# Patient Record
Sex: MALE | Race: WHITE | NOT HISPANIC OR LATINO | Employment: OTHER | URBAN - METROPOLITAN AREA
[De-identification: names, ages, dates, MRNs, and addresses within clinical notes are randomized per-mention and may not be internally consistent; named-entity substitution may affect disease eponyms.]

---

## 2017-01-10 ENCOUNTER — COMMUNICATION - HEALTHEAST (OUTPATIENT)
Dept: FAMILY MEDICINE | Facility: CLINIC | Age: 62
End: 2017-01-10

## 2017-01-10 DIAGNOSIS — M54.50 LOWER BACK PAIN: ICD-10-CM

## 2017-01-26 ENCOUNTER — COMMUNICATION - HEALTHEAST (OUTPATIENT)
Dept: FAMILY MEDICINE | Facility: CLINIC | Age: 62
End: 2017-01-26

## 2017-01-26 DIAGNOSIS — K21.9 GERD (GASTROESOPHAGEAL REFLUX DISEASE): ICD-10-CM

## 2017-02-09 ENCOUNTER — COMMUNICATION - HEALTHEAST (OUTPATIENT)
Dept: FAMILY MEDICINE | Facility: CLINIC | Age: 62
End: 2017-02-09

## 2017-02-09 DIAGNOSIS — M54.50 LOWER BACK PAIN: ICD-10-CM

## 2017-03-10 ENCOUNTER — COMMUNICATION - HEALTHEAST (OUTPATIENT)
Dept: FAMILY MEDICINE | Facility: CLINIC | Age: 62
End: 2017-03-10

## 2017-03-10 DIAGNOSIS — E78.5 HYPERLIPIDEMIA: ICD-10-CM

## 2017-03-10 DIAGNOSIS — M54.50 LOWER BACK PAIN: ICD-10-CM

## 2017-03-16 ENCOUNTER — COMMUNICATION - HEALTHEAST (OUTPATIENT)
Dept: FAMILY MEDICINE | Facility: CLINIC | Age: 62
End: 2017-03-16

## 2017-03-16 DIAGNOSIS — I10 HTN (HYPERTENSION): ICD-10-CM

## 2017-03-24 ENCOUNTER — COMMUNICATION - HEALTHEAST (OUTPATIENT)
Dept: FAMILY MEDICINE | Facility: CLINIC | Age: 62
End: 2017-03-24

## 2017-04-10 ENCOUNTER — OFFICE VISIT - HEALTHEAST (OUTPATIENT)
Dept: SLEEP MEDICINE | Facility: CLINIC | Age: 62
End: 2017-04-10

## 2017-04-10 DIAGNOSIS — R06.89 HYPOVENTILATION: ICD-10-CM

## 2017-04-10 DIAGNOSIS — J98.6 DISORDERS OF DIAPHRAGM: ICD-10-CM

## 2017-04-10 ASSESSMENT — MIFFLIN-ST. JEOR: SCORE: 1639.94

## 2017-04-11 ENCOUNTER — AMBULATORY - HEALTHEAST (OUTPATIENT)
Dept: SLEEP MEDICINE | Facility: CLINIC | Age: 62
End: 2017-04-11

## 2017-04-11 ENCOUNTER — COMMUNICATION - HEALTHEAST (OUTPATIENT)
Dept: FAMILY MEDICINE | Facility: CLINIC | Age: 62
End: 2017-04-11

## 2017-04-11 DIAGNOSIS — M54.50 LOWER BACK PAIN: ICD-10-CM

## 2017-04-18 ENCOUNTER — AMBULATORY - HEALTHEAST (OUTPATIENT)
Dept: PULMONOLOGY | Facility: OTHER | Age: 62
End: 2017-04-18

## 2017-04-18 DIAGNOSIS — J98.6 DIAPHRAGM PARALYSIS: ICD-10-CM

## 2017-05-03 ENCOUNTER — COMMUNICATION - HEALTHEAST (OUTPATIENT)
Dept: PULMONOLOGY | Facility: OTHER | Age: 62
End: 2017-05-03

## 2017-05-10 ENCOUNTER — COMMUNICATION - HEALTHEAST (OUTPATIENT)
Dept: FAMILY MEDICINE | Facility: CLINIC | Age: 62
End: 2017-05-10

## 2017-05-10 DIAGNOSIS — M54.50 LOWER BACK PAIN: ICD-10-CM

## 2017-05-17 ENCOUNTER — OFFICE VISIT - HEALTHEAST (OUTPATIENT)
Dept: PULMONOLOGY | Facility: OTHER | Age: 62
End: 2017-05-17

## 2017-05-17 ENCOUNTER — RECORDS - HEALTHEAST (OUTPATIENT)
Dept: PULMONOLOGY | Facility: OTHER | Age: 62
End: 2017-05-17

## 2017-05-17 ENCOUNTER — RECORDS - HEALTHEAST (OUTPATIENT)
Dept: ADMINISTRATIVE | Facility: OTHER | Age: 62
End: 2017-05-17

## 2017-05-17 DIAGNOSIS — J99 NEUROMUSCULAR RESPIRATORY WEAKNESS (H): ICD-10-CM

## 2017-05-17 DIAGNOSIS — G70.9 NEUROMUSCULAR RESPIRATORY WEAKNESS (H): ICD-10-CM

## 2017-05-17 DIAGNOSIS — J98.6 DISORDERS OF DIAPHRAGM: ICD-10-CM

## 2017-05-25 ENCOUNTER — OFFICE VISIT - HEALTHEAST (OUTPATIENT)
Dept: PULMONOLOGY | Facility: OTHER | Age: 62
End: 2017-05-25

## 2017-05-25 DIAGNOSIS — J98.6 DIAPHRAGM PARALYSIS: ICD-10-CM

## 2017-05-25 ASSESSMENT — MIFFLIN-ST. JEOR: SCORE: 1621.8

## 2017-06-09 ENCOUNTER — COMMUNICATION - HEALTHEAST (OUTPATIENT)
Dept: FAMILY MEDICINE | Facility: CLINIC | Age: 62
End: 2017-06-09

## 2017-06-09 DIAGNOSIS — M54.50 LOWER BACK PAIN: ICD-10-CM

## 2017-07-10 ENCOUNTER — COMMUNICATION - HEALTHEAST (OUTPATIENT)
Dept: FAMILY MEDICINE | Facility: CLINIC | Age: 62
End: 2017-07-10

## 2017-07-10 ENCOUNTER — OFFICE VISIT - HEALTHEAST (OUTPATIENT)
Dept: SLEEP MEDICINE | Facility: CLINIC | Age: 62
End: 2017-07-10

## 2017-07-10 DIAGNOSIS — R06.89 HYPOVENTILATION: ICD-10-CM

## 2017-07-10 DIAGNOSIS — M54.50 LOWER BACK PAIN: ICD-10-CM

## 2017-07-10 ASSESSMENT — MIFFLIN-ST. JEOR: SCORE: 1603.66

## 2017-08-08 ENCOUNTER — COMMUNICATION - HEALTHEAST (OUTPATIENT)
Dept: FAMILY MEDICINE | Facility: CLINIC | Age: 62
End: 2017-08-08

## 2017-08-08 DIAGNOSIS — M54.50 LOWER BACK PAIN: ICD-10-CM

## 2017-09-08 ENCOUNTER — COMMUNICATION - HEALTHEAST (OUTPATIENT)
Dept: FAMILY MEDICINE | Facility: CLINIC | Age: 62
End: 2017-09-08

## 2017-09-08 DIAGNOSIS — M54.50 LOWER BACK PAIN: ICD-10-CM

## 2017-10-06 ENCOUNTER — COMMUNICATION - HEALTHEAST (OUTPATIENT)
Dept: FAMILY MEDICINE | Facility: CLINIC | Age: 62
End: 2017-10-06

## 2017-10-06 DIAGNOSIS — M54.50 LOWER BACK PAIN: ICD-10-CM

## 2017-11-06 ENCOUNTER — COMMUNICATION - HEALTHEAST (OUTPATIENT)
Dept: FAMILY MEDICINE | Facility: CLINIC | Age: 62
End: 2017-11-06

## 2017-11-06 DIAGNOSIS — M54.50 LOWER BACK PAIN: ICD-10-CM

## 2017-12-06 ENCOUNTER — COMMUNICATION - HEALTHEAST (OUTPATIENT)
Dept: FAMILY MEDICINE | Facility: CLINIC | Age: 62
End: 2017-12-06

## 2017-12-06 DIAGNOSIS — M54.50 LOWER BACK PAIN: ICD-10-CM

## 2017-12-17 ENCOUNTER — COMMUNICATION - HEALTHEAST (OUTPATIENT)
Dept: FAMILY MEDICINE | Facility: CLINIC | Age: 62
End: 2017-12-17

## 2017-12-17 DIAGNOSIS — E78.5 HYPERLIPIDEMIA: ICD-10-CM

## 2017-12-20 ENCOUNTER — COMMUNICATION - HEALTHEAST (OUTPATIENT)
Dept: FAMILY MEDICINE | Facility: CLINIC | Age: 62
End: 2017-12-20

## 2018-01-15 ENCOUNTER — COMMUNICATION - HEALTHEAST (OUTPATIENT)
Dept: FAMILY MEDICINE | Facility: CLINIC | Age: 63
End: 2018-01-15

## 2018-01-15 DIAGNOSIS — M54.50 LOWER BACK PAIN: ICD-10-CM

## 2018-01-29 ENCOUNTER — COMMUNICATION - HEALTHEAST (OUTPATIENT)
Dept: FAMILY MEDICINE | Facility: CLINIC | Age: 63
End: 2018-01-29

## 2018-01-29 DIAGNOSIS — K21.9 GERD (GASTROESOPHAGEAL REFLUX DISEASE): ICD-10-CM

## 2018-02-15 ENCOUNTER — COMMUNICATION - HEALTHEAST (OUTPATIENT)
Dept: FAMILY MEDICINE | Facility: CLINIC | Age: 63
End: 2018-02-15

## 2018-02-15 DIAGNOSIS — M54.50 LOWER BACK PAIN: ICD-10-CM

## 2018-03-13 ENCOUNTER — COMMUNICATION - HEALTHEAST (OUTPATIENT)
Dept: FAMILY MEDICINE | Facility: CLINIC | Age: 63
End: 2018-03-13

## 2018-03-13 DIAGNOSIS — E78.5 HYPERLIPIDEMIA: ICD-10-CM

## 2018-03-19 ENCOUNTER — COMMUNICATION - HEALTHEAST (OUTPATIENT)
Dept: FAMILY MEDICINE | Facility: CLINIC | Age: 63
End: 2018-03-19

## 2018-03-19 DIAGNOSIS — E78.5 HYPERLIPIDEMIA: ICD-10-CM

## 2018-03-21 ENCOUNTER — COMMUNICATION - HEALTHEAST (OUTPATIENT)
Dept: FAMILY MEDICINE | Facility: CLINIC | Age: 63
End: 2018-03-21

## 2018-03-21 DIAGNOSIS — M54.50 LOWER BACK PAIN: ICD-10-CM

## 2018-03-26 ENCOUNTER — OFFICE VISIT - HEALTHEAST (OUTPATIENT)
Dept: FAMILY MEDICINE | Facility: CLINIC | Age: 63
End: 2018-03-26

## 2018-03-26 DIAGNOSIS — I10 ESSENTIAL HYPERTENSION: ICD-10-CM

## 2018-03-26 DIAGNOSIS — E78.5 HYPERLIPIDEMIA: ICD-10-CM

## 2018-03-26 LAB
ALBUMIN SERPL-MCNC: 3.4 G/DL (ref 3.5–5)
ALP SERPL-CCNC: 230 U/L (ref 45–120)
ALT SERPL W P-5'-P-CCNC: 30 U/L (ref 0–45)
ANION GAP SERPL CALCULATED.3IONS-SCNC: 13 MMOL/L (ref 5–18)
AST SERPL W P-5'-P-CCNC: 47 U/L (ref 0–40)
BILIRUB SERPL-MCNC: 0.5 MG/DL (ref 0–1)
BUN SERPL-MCNC: 5 MG/DL (ref 8–22)
CALCIUM SERPL-MCNC: 9.2 MG/DL (ref 8.5–10.5)
CHLORIDE BLD-SCNC: 102 MMOL/L (ref 98–107)
CHOLEST SERPL-MCNC: 176 MG/DL
CO2 SERPL-SCNC: 25 MMOL/L (ref 22–31)
CREAT SERPL-MCNC: 0.68 MG/DL (ref 0.7–1.3)
FASTING STATUS PATIENT QL REPORTED: YES
GFR SERPL CREATININE-BSD FRML MDRD: >60 ML/MIN/1.73M2
GLUCOSE BLD-MCNC: 121 MG/DL (ref 70–125)
HDLC SERPL-MCNC: 67 MG/DL
LDLC SERPL CALC-MCNC: 95 MG/DL
POTASSIUM BLD-SCNC: 3.8 MMOL/L (ref 3.5–5)
PROT SERPL-MCNC: 7 G/DL (ref 6–8)
SODIUM SERPL-SCNC: 140 MMOL/L (ref 136–145)
TRIGL SERPL-MCNC: 70 MG/DL

## 2018-03-30 ENCOUNTER — COMMUNICATION - HEALTHEAST (OUTPATIENT)
Dept: NURSING | Facility: CLINIC | Age: 63
End: 2018-03-30

## 2018-03-30 ENCOUNTER — COMMUNICATION - HEALTHEAST (OUTPATIENT)
Dept: FAMILY MEDICINE | Facility: CLINIC | Age: 63
End: 2018-03-30

## 2018-04-19 ENCOUNTER — COMMUNICATION - HEALTHEAST (OUTPATIENT)
Dept: FAMILY MEDICINE | Facility: CLINIC | Age: 63
End: 2018-04-19

## 2018-04-19 DIAGNOSIS — M54.50 LOWER BACK PAIN: ICD-10-CM

## 2018-04-29 ENCOUNTER — COMMUNICATION - HEALTHEAST (OUTPATIENT)
Dept: FAMILY MEDICINE | Facility: CLINIC | Age: 63
End: 2018-04-29

## 2018-04-29 DIAGNOSIS — K21.9 GERD (GASTROESOPHAGEAL REFLUX DISEASE): ICD-10-CM

## 2018-04-30 ENCOUNTER — COMMUNICATION - HEALTHEAST (OUTPATIENT)
Dept: FAMILY MEDICINE | Facility: CLINIC | Age: 63
End: 2018-04-30

## 2018-04-30 DIAGNOSIS — E78.5 HYPERLIPIDEMIA: ICD-10-CM

## 2018-05-21 ENCOUNTER — COMMUNICATION - HEALTHEAST (OUTPATIENT)
Dept: FAMILY MEDICINE | Facility: CLINIC | Age: 63
End: 2018-05-21

## 2018-05-21 DIAGNOSIS — M54.50 LOWER BACK PAIN: ICD-10-CM

## 2018-06-19 ENCOUNTER — RECORDS - HEALTHEAST (OUTPATIENT)
Dept: GENERAL RADIOLOGY | Facility: CLINIC | Age: 63
End: 2018-06-19

## 2018-06-19 ENCOUNTER — OFFICE VISIT - HEALTHEAST (OUTPATIENT)
Dept: FAMILY MEDICINE | Facility: CLINIC | Age: 63
End: 2018-06-19

## 2018-06-19 DIAGNOSIS — M54.2 NECK PAIN: ICD-10-CM

## 2018-06-19 DIAGNOSIS — Z95.5 HX OF HEART ARTERY STENT: ICD-10-CM

## 2018-06-19 DIAGNOSIS — I25.2 H/O NON-ST ELEVATION MYOCARDIAL INFARCTION (NSTEMI): ICD-10-CM

## 2018-06-19 DIAGNOSIS — M54.2 CERVICALGIA: ICD-10-CM

## 2018-06-19 DIAGNOSIS — Z12.11 SCREEN FOR COLON CANCER: ICD-10-CM

## 2018-06-19 DIAGNOSIS — M79.604 BILATERAL LEG PAIN: ICD-10-CM

## 2018-06-19 DIAGNOSIS — M79.605 BILATERAL LEG PAIN: ICD-10-CM

## 2018-06-19 RX ORDER — NITROGLYCERIN 0.4 MG/1
0.4 TABLET SUBLINGUAL EVERY 5 MIN PRN
Status: SHIPPED | COMMUNITY
Start: 2018-06-09

## 2018-06-20 ENCOUNTER — AMBULATORY - HEALTHEAST (OUTPATIENT)
Dept: FAMILY MEDICINE | Facility: CLINIC | Age: 63
End: 2018-06-20

## 2018-06-20 DIAGNOSIS — I65.23 CAROTID ARTERY CALCIFICATION, BILATERAL: ICD-10-CM

## 2018-06-21 ENCOUNTER — COMMUNICATION - HEALTHEAST (OUTPATIENT)
Dept: FAMILY MEDICINE | Facility: CLINIC | Age: 63
End: 2018-06-21

## 2018-06-21 DIAGNOSIS — M54.50 LOWER BACK PAIN: ICD-10-CM

## 2018-06-25 ENCOUNTER — COMMUNICATION - HEALTHEAST (OUTPATIENT)
Dept: FAMILY MEDICINE | Facility: CLINIC | Age: 63
End: 2018-06-25

## 2018-06-25 DIAGNOSIS — M54.50 LOWER BACK PAIN: ICD-10-CM

## 2018-06-25 DIAGNOSIS — M54.12 CERVICAL RADICULOPATHY: ICD-10-CM

## 2018-06-27 ENCOUNTER — RECORDS - HEALTHEAST (OUTPATIENT)
Dept: VASCULAR ULTRASOUND | Facility: CLINIC | Age: 63
End: 2018-06-27

## 2018-06-27 ENCOUNTER — OFFICE VISIT - HEALTHEAST (OUTPATIENT)
Dept: VASCULAR SURGERY | Facility: CLINIC | Age: 63
End: 2018-06-27

## 2018-06-27 DIAGNOSIS — Z95.5 HX OF HEART ARTERY STENT: ICD-10-CM

## 2018-06-27 DIAGNOSIS — Z95.5 PRESENCE OF CORONARY ANGIOPLASTY IMPLANT AND GRAFT: ICD-10-CM

## 2018-06-27 DIAGNOSIS — I71.40 ABDOMINAL AORTIC ANEURYSM, WITHOUT RUPTURE: ICD-10-CM

## 2018-06-27 DIAGNOSIS — I25.10 CORONARY ATHEROSCLEROSIS: ICD-10-CM

## 2018-06-27 DIAGNOSIS — Z13.6 ENCOUNTER FOR SCREENING FOR CARDIOVASCULAR DISORDERS: ICD-10-CM

## 2018-06-27 DIAGNOSIS — I71.40 AAA (ABDOMINAL AORTIC ANEURYSM) (H): ICD-10-CM

## 2018-06-27 DIAGNOSIS — I25.2 H/O NON-ST ELEVATION MYOCARDIAL INFARCTION (NSTEMI): ICD-10-CM

## 2018-06-27 DIAGNOSIS — Z13.6 SCREENING FOR AAA (ABDOMINAL AORTIC ANEURYSM): ICD-10-CM

## 2018-06-27 DIAGNOSIS — I25.10 ATHEROSCLEROTIC HEART DISEASE OF NATIVE CORONARY ARTERY WITHOUT ANGINA PECTORIS: ICD-10-CM

## 2018-06-27 DIAGNOSIS — I25.2 OLD MYOCARDIAL INFARCTION: ICD-10-CM

## 2018-06-27 DIAGNOSIS — I65.23 OCCLUSION AND STENOSIS OF BILATERAL CAROTID ARTERIES: ICD-10-CM

## 2018-06-29 ENCOUNTER — AMBULATORY - HEALTHEAST (OUTPATIENT)
Dept: FAMILY MEDICINE | Facility: CLINIC | Age: 63
End: 2018-06-29

## 2018-06-29 DIAGNOSIS — M51.379 DEGENERATION OF LUMBAR OR LUMBOSACRAL INTERVERTEBRAL DISC: ICD-10-CM

## 2018-06-29 DIAGNOSIS — I25.10 ATHEROSCLEROSIS OF NATIVE CORONARY ARTERY OF NATIVE HEART WITHOUT ANGINA PECTORIS: ICD-10-CM

## 2018-07-09 ENCOUNTER — RECORDS - HEALTHEAST (OUTPATIENT)
Dept: ADMINISTRATIVE | Facility: OTHER | Age: 63
End: 2018-07-09

## 2018-07-09 ENCOUNTER — AMBULATORY - HEALTHEAST (OUTPATIENT)
Dept: CARDIAC REHAB | Facility: CLINIC | Age: 63
End: 2018-07-09

## 2018-07-09 DIAGNOSIS — Z95.5 S/P DRUG ELUTING CORONARY STENT PLACEMENT: ICD-10-CM

## 2018-07-09 DIAGNOSIS — I21.4 NON-ST ELEVATION MI (NSTEMI) (H): ICD-10-CM

## 2018-07-10 ENCOUNTER — HOSPITAL ENCOUNTER (OUTPATIENT)
Dept: MRI IMAGING | Facility: CLINIC | Age: 63
Discharge: HOME | End: 2018-07-10
Attending: FAMILY MEDICINE

## 2018-07-10 DIAGNOSIS — M51.379 DEGENERATION OF LUMBAR OR LUMBOSACRAL INTERVERTEBRAL DISC: ICD-10-CM

## 2018-07-11 ENCOUNTER — OFFICE VISIT - HEALTHEAST (OUTPATIENT)
Dept: PHYSICAL THERAPY | Facility: REHABILITATION | Age: 63
End: 2018-07-11

## 2018-07-11 DIAGNOSIS — R29.3 POOR POSTURE: ICD-10-CM

## 2018-07-11 DIAGNOSIS — M43.6 NECK STIFFNESS: ICD-10-CM

## 2018-07-11 DIAGNOSIS — R68.89 NECK PROBLEM: ICD-10-CM

## 2018-07-12 ENCOUNTER — AMBULATORY - HEALTHEAST (OUTPATIENT)
Dept: CARDIAC REHAB | Facility: CLINIC | Age: 63
End: 2018-07-12

## 2018-07-12 ENCOUNTER — COMMUNICATION - HEALTHEAST (OUTPATIENT)
Dept: FAMILY MEDICINE | Facility: CLINIC | Age: 63
End: 2018-07-12

## 2018-07-12 DIAGNOSIS — I21.4 NON-ST ELEVATION MI (NSTEMI) (H): ICD-10-CM

## 2018-07-12 DIAGNOSIS — Z95.5 S/P DRUG ELUTING CORONARY STENT PLACEMENT: ICD-10-CM

## 2018-07-15 ENCOUNTER — COMMUNICATION - HEALTHEAST (OUTPATIENT)
Dept: FAMILY MEDICINE | Facility: CLINIC | Age: 63
End: 2018-07-15

## 2018-07-16 ENCOUNTER — COMMUNICATION - HEALTHEAST (OUTPATIENT)
Dept: FAMILY MEDICINE | Facility: CLINIC | Age: 63
End: 2018-07-16

## 2018-07-16 ENCOUNTER — OFFICE VISIT - HEALTHEAST (OUTPATIENT)
Dept: PHYSICAL THERAPY | Facility: REHABILITATION | Age: 63
End: 2018-07-16

## 2018-07-16 DIAGNOSIS — R29.3 POOR POSTURE: ICD-10-CM

## 2018-07-16 DIAGNOSIS — M43.6 NECK STIFFNESS: ICD-10-CM

## 2018-07-16 DIAGNOSIS — M54.50 LOWER BACK PAIN: ICD-10-CM

## 2018-07-16 DIAGNOSIS — R19.5 POSITIVE COLORECTAL CANCER SCREENING USING DNA-BASED STOOL TEST: ICD-10-CM

## 2018-07-16 DIAGNOSIS — R68.89 NECK PROBLEM: ICD-10-CM

## 2018-07-17 ENCOUNTER — AMBULATORY - HEALTHEAST (OUTPATIENT)
Dept: CARDIAC REHAB | Facility: CLINIC | Age: 63
End: 2018-07-17

## 2018-07-17 DIAGNOSIS — I21.4 NON-ST ELEVATION MI (NSTEMI) (H): ICD-10-CM

## 2018-07-17 DIAGNOSIS — Z95.5 S/P DRUG ELUTING CORONARY STENT PLACEMENT: ICD-10-CM

## 2018-07-19 ENCOUNTER — AMBULATORY - HEALTHEAST (OUTPATIENT)
Dept: CARDIAC REHAB | Facility: CLINIC | Age: 63
End: 2018-07-19

## 2018-07-19 DIAGNOSIS — I21.4 NON-ST ELEVATION MI (NSTEMI) (H): ICD-10-CM

## 2018-07-19 DIAGNOSIS — Z95.5 S/P DRUG ELUTING CORONARY STENT PLACEMENT: ICD-10-CM

## 2018-07-23 ENCOUNTER — OFFICE VISIT - HEALTHEAST (OUTPATIENT)
Dept: PHYSICAL THERAPY | Facility: REHABILITATION | Age: 63
End: 2018-07-23

## 2018-07-23 DIAGNOSIS — R68.89 NECK PROBLEM: ICD-10-CM

## 2018-07-23 DIAGNOSIS — M43.6 NECK STIFFNESS: ICD-10-CM

## 2018-07-23 DIAGNOSIS — R29.3 POOR POSTURE: ICD-10-CM

## 2018-07-24 ENCOUNTER — AMBULATORY - HEALTHEAST (OUTPATIENT)
Dept: CARDIAC REHAB | Facility: CLINIC | Age: 63
End: 2018-07-24

## 2018-07-24 DIAGNOSIS — I21.4 NON-ST ELEVATION MI (NSTEMI) (H): ICD-10-CM

## 2018-07-24 DIAGNOSIS — Z95.5 S/P DRUG ELUTING CORONARY STENT PLACEMENT: ICD-10-CM

## 2018-07-26 ENCOUNTER — AMBULATORY - HEALTHEAST (OUTPATIENT)
Dept: CARDIAC REHAB | Facility: CLINIC | Age: 63
End: 2018-07-26

## 2018-07-26 DIAGNOSIS — I21.4 NON-ST ELEVATION MI (NSTEMI) (H): ICD-10-CM

## 2018-07-26 DIAGNOSIS — Z95.5 S/P DRUG ELUTING CORONARY STENT PLACEMENT: ICD-10-CM

## 2018-07-31 ENCOUNTER — AMBULATORY - HEALTHEAST (OUTPATIENT)
Dept: CARDIAC REHAB | Facility: CLINIC | Age: 63
End: 2018-07-31

## 2018-07-31 DIAGNOSIS — I21.4 NON-ST ELEVATION MI (NSTEMI) (H): ICD-10-CM

## 2018-07-31 DIAGNOSIS — Z95.5 S/P DRUG ELUTING CORONARY STENT PLACEMENT: ICD-10-CM

## 2018-08-02 ENCOUNTER — AMBULATORY - HEALTHEAST (OUTPATIENT)
Dept: CARDIAC REHAB | Facility: CLINIC | Age: 63
End: 2018-08-02

## 2018-08-02 DIAGNOSIS — I21.4 NON-ST ELEVATION MI (NSTEMI) (H): ICD-10-CM

## 2018-08-02 DIAGNOSIS — Z95.5 S/P DRUG ELUTING CORONARY STENT PLACEMENT: ICD-10-CM

## 2018-08-07 ENCOUNTER — AMBULATORY - HEALTHEAST (OUTPATIENT)
Dept: CARDIAC REHAB | Facility: CLINIC | Age: 63
End: 2018-08-07

## 2018-08-07 DIAGNOSIS — Z95.5 S/P DRUG ELUTING CORONARY STENT PLACEMENT: ICD-10-CM

## 2018-08-07 DIAGNOSIS — I21.4 NON-ST ELEVATION MI (NSTEMI) (H): ICD-10-CM

## 2018-08-09 ENCOUNTER — AMBULATORY - HEALTHEAST (OUTPATIENT)
Dept: CARDIAC REHAB | Facility: CLINIC | Age: 63
End: 2018-08-09

## 2018-08-09 DIAGNOSIS — Z95.5 S/P DRUG ELUTING CORONARY STENT PLACEMENT: ICD-10-CM

## 2018-08-09 DIAGNOSIS — I21.4 NON-ST ELEVATION MI (NSTEMI) (H): ICD-10-CM

## 2018-08-14 ENCOUNTER — AMBULATORY - HEALTHEAST (OUTPATIENT)
Dept: CARDIAC REHAB | Facility: CLINIC | Age: 63
End: 2018-08-14

## 2018-08-14 DIAGNOSIS — Z95.5 S/P DRUG ELUTING CORONARY STENT PLACEMENT: ICD-10-CM

## 2018-08-14 DIAGNOSIS — I21.4 NON-ST ELEVATION MI (NSTEMI) (H): ICD-10-CM

## 2018-08-15 ENCOUNTER — COMMUNICATION - HEALTHEAST (OUTPATIENT)
Dept: FAMILY MEDICINE | Facility: CLINIC | Age: 63
End: 2018-08-15

## 2018-08-15 DIAGNOSIS — M54.50 LOWER BACK PAIN: ICD-10-CM

## 2018-08-16 ENCOUNTER — AMBULATORY - HEALTHEAST (OUTPATIENT)
Dept: CARDIAC REHAB | Facility: CLINIC | Age: 63
End: 2018-08-16

## 2018-08-16 DIAGNOSIS — Z95.5 S/P DRUG ELUTING CORONARY STENT PLACEMENT: ICD-10-CM

## 2018-08-16 DIAGNOSIS — I21.4 NON-ST ELEVATION MI (NSTEMI) (H): ICD-10-CM

## 2018-08-21 ENCOUNTER — AMBULATORY - HEALTHEAST (OUTPATIENT)
Dept: CARDIAC REHAB | Facility: CLINIC | Age: 63
End: 2018-08-21

## 2018-08-21 DIAGNOSIS — I21.4 NON-ST ELEVATION MI (NSTEMI) (H): ICD-10-CM

## 2018-08-21 DIAGNOSIS — Z95.5 S/P DRUG ELUTING CORONARY STENT PLACEMENT: ICD-10-CM

## 2018-08-23 ENCOUNTER — AMBULATORY - HEALTHEAST (OUTPATIENT)
Dept: CARDIAC REHAB | Facility: CLINIC | Age: 63
End: 2018-08-23

## 2018-08-23 DIAGNOSIS — Z95.5 S/P DRUG ELUTING CORONARY STENT PLACEMENT: ICD-10-CM

## 2018-08-23 DIAGNOSIS — I21.4 NON-ST ELEVATION MI (NSTEMI) (H): ICD-10-CM

## 2018-08-28 ENCOUNTER — AMBULATORY - HEALTHEAST (OUTPATIENT)
Dept: CARDIAC REHAB | Facility: CLINIC | Age: 63
End: 2018-08-28

## 2018-08-28 DIAGNOSIS — I21.4 NON-ST ELEVATION MI (NSTEMI) (H): ICD-10-CM

## 2018-08-28 DIAGNOSIS — Z95.5 S/P DRUG ELUTING CORONARY STENT PLACEMENT: ICD-10-CM

## 2018-08-30 ENCOUNTER — AMBULATORY - HEALTHEAST (OUTPATIENT)
Dept: CARDIAC REHAB | Facility: CLINIC | Age: 63
End: 2018-08-30

## 2018-08-30 DIAGNOSIS — Z95.5 S/P DRUG ELUTING CORONARY STENT PLACEMENT: ICD-10-CM

## 2018-08-30 DIAGNOSIS — I21.4 NON-ST ELEVATION MI (NSTEMI) (H): ICD-10-CM

## 2018-09-04 ENCOUNTER — AMBULATORY - HEALTHEAST (OUTPATIENT)
Dept: CARDIAC REHAB | Facility: CLINIC | Age: 63
End: 2018-09-04

## 2018-09-04 DIAGNOSIS — Z95.5 S/P DRUG ELUTING CORONARY STENT PLACEMENT: ICD-10-CM

## 2018-09-04 DIAGNOSIS — I21.4 NON-ST ELEVATION MI (NSTEMI) (H): ICD-10-CM

## 2018-09-06 ENCOUNTER — AMBULATORY - HEALTHEAST (OUTPATIENT)
Dept: CARDIAC REHAB | Facility: CLINIC | Age: 63
End: 2018-09-06

## 2018-09-06 DIAGNOSIS — I21.4 NON-ST ELEVATION MI (NSTEMI) (H): ICD-10-CM

## 2018-09-06 DIAGNOSIS — Z95.5 S/P DRUG ELUTING CORONARY STENT PLACEMENT: ICD-10-CM

## 2018-09-07 ENCOUNTER — COMMUNICATION - HEALTHEAST (OUTPATIENT)
Dept: FAMILY MEDICINE | Facility: CLINIC | Age: 63
End: 2018-09-07

## 2018-09-11 ENCOUNTER — AMBULATORY - HEALTHEAST (OUTPATIENT)
Dept: CARDIAC REHAB | Facility: CLINIC | Age: 63
End: 2018-09-11

## 2018-09-11 DIAGNOSIS — I21.4 NON-ST ELEVATION MI (NSTEMI) (H): ICD-10-CM

## 2018-09-11 DIAGNOSIS — Z95.5 S/P DRUG ELUTING CORONARY STENT PLACEMENT: ICD-10-CM

## 2018-09-13 ENCOUNTER — AMBULATORY - HEALTHEAST (OUTPATIENT)
Dept: CARDIAC REHAB | Facility: CLINIC | Age: 63
End: 2018-09-13

## 2018-09-13 ENCOUNTER — COMMUNICATION - HEALTHEAST (OUTPATIENT)
Dept: FAMILY MEDICINE | Facility: CLINIC | Age: 63
End: 2018-09-13

## 2018-09-13 DIAGNOSIS — I21.4 NON-ST ELEVATION MI (NSTEMI) (H): ICD-10-CM

## 2018-09-13 DIAGNOSIS — M54.50 LOWER BACK PAIN: ICD-10-CM

## 2018-09-13 DIAGNOSIS — Z95.5 S/P DRUG ELUTING CORONARY STENT PLACEMENT: ICD-10-CM

## 2018-09-18 ENCOUNTER — AMBULATORY - HEALTHEAST (OUTPATIENT)
Dept: CARDIAC REHAB | Facility: CLINIC | Age: 63
End: 2018-09-18

## 2018-09-18 DIAGNOSIS — I21.4 NON-ST ELEVATION MI (NSTEMI) (H): ICD-10-CM

## 2018-09-18 DIAGNOSIS — Z95.5 S/P DRUG ELUTING CORONARY STENT PLACEMENT: ICD-10-CM

## 2018-09-20 ENCOUNTER — AMBULATORY - HEALTHEAST (OUTPATIENT)
Dept: CARDIAC REHAB | Facility: CLINIC | Age: 63
End: 2018-09-20

## 2018-09-20 DIAGNOSIS — I21.4 NON-ST ELEVATION MI (NSTEMI) (H): ICD-10-CM

## 2018-09-20 DIAGNOSIS — Z95.5 S/P DRUG ELUTING CORONARY STENT PLACEMENT: ICD-10-CM

## 2018-09-25 ENCOUNTER — AMBULATORY - HEALTHEAST (OUTPATIENT)
Dept: CARDIAC REHAB | Facility: CLINIC | Age: 63
End: 2018-09-25

## 2018-09-25 DIAGNOSIS — Z95.5 S/P DRUG ELUTING CORONARY STENT PLACEMENT: ICD-10-CM

## 2018-09-25 DIAGNOSIS — I21.4 NON-ST ELEVATION MI (NSTEMI) (H): ICD-10-CM

## 2018-09-26 ENCOUNTER — COMMUNICATION - HEALTHEAST (OUTPATIENT)
Dept: FAMILY MEDICINE | Facility: CLINIC | Age: 63
End: 2018-09-26

## 2018-09-27 ENCOUNTER — AMBULATORY - HEALTHEAST (OUTPATIENT)
Dept: CARDIAC REHAB | Facility: CLINIC | Age: 63
End: 2018-09-27

## 2018-09-27 DIAGNOSIS — Z95.5 S/P DRUG ELUTING CORONARY STENT PLACEMENT: ICD-10-CM

## 2018-09-27 DIAGNOSIS — I21.4 NON-ST ELEVATION MI (NSTEMI) (H): ICD-10-CM

## 2018-10-02 ENCOUNTER — AMBULATORY - HEALTHEAST (OUTPATIENT)
Dept: CARDIAC REHAB | Facility: CLINIC | Age: 63
End: 2018-10-02

## 2018-10-02 DIAGNOSIS — I21.4 NON-ST ELEVATION MI (NSTEMI) (H): ICD-10-CM

## 2018-10-02 DIAGNOSIS — Z95.5 S/P DRUG ELUTING CORONARY STENT PLACEMENT: ICD-10-CM

## 2018-10-04 ENCOUNTER — AMBULATORY - HEALTHEAST (OUTPATIENT)
Dept: CARDIAC REHAB | Facility: CLINIC | Age: 63
End: 2018-10-04

## 2018-10-04 DIAGNOSIS — Z95.5 S/P DRUG ELUTING CORONARY STENT PLACEMENT: ICD-10-CM

## 2018-10-04 DIAGNOSIS — I21.4 NON-ST ELEVATION MI (NSTEMI) (H): ICD-10-CM

## 2018-10-08 ENCOUNTER — COMMUNICATION - HEALTHEAST (OUTPATIENT)
Dept: ADMINISTRATIVE | Facility: CLINIC | Age: 63
End: 2018-10-08

## 2018-10-09 ENCOUNTER — AMBULATORY - HEALTHEAST (OUTPATIENT)
Dept: CARDIAC REHAB | Facility: CLINIC | Age: 63
End: 2018-10-09

## 2018-10-09 DIAGNOSIS — Z95.5 S/P DRUG ELUTING CORONARY STENT PLACEMENT: ICD-10-CM

## 2018-10-09 DIAGNOSIS — I21.4 NON-ST ELEVATION MI (NSTEMI) (H): ICD-10-CM

## 2018-10-11 ENCOUNTER — AMBULATORY - HEALTHEAST (OUTPATIENT)
Dept: CARDIAC REHAB | Facility: CLINIC | Age: 63
End: 2018-10-11

## 2018-10-11 DIAGNOSIS — I21.4 NON-ST ELEVATION MI (NSTEMI) (H): ICD-10-CM

## 2018-10-11 DIAGNOSIS — Z95.5 S/P DRUG ELUTING CORONARY STENT PLACEMENT: ICD-10-CM

## 2018-10-15 ENCOUNTER — COMMUNICATION - HEALTHEAST (OUTPATIENT)
Dept: FAMILY MEDICINE | Facility: CLINIC | Age: 63
End: 2018-10-15

## 2018-10-15 DIAGNOSIS — M54.50 LOWER BACK PAIN: ICD-10-CM

## 2018-10-16 ENCOUNTER — AMBULATORY - HEALTHEAST (OUTPATIENT)
Dept: CARDIAC REHAB | Facility: CLINIC | Age: 63
End: 2018-10-16

## 2018-10-16 DIAGNOSIS — Z95.5 S/P DRUG ELUTING CORONARY STENT PLACEMENT: ICD-10-CM

## 2018-10-16 DIAGNOSIS — I21.4 NON-ST ELEVATION MI (NSTEMI) (H): ICD-10-CM

## 2018-10-18 ENCOUNTER — AMBULATORY - HEALTHEAST (OUTPATIENT)
Dept: CARDIAC REHAB | Facility: CLINIC | Age: 63
End: 2018-10-18

## 2018-10-18 DIAGNOSIS — Z95.5 S/P DRUG ELUTING CORONARY STENT PLACEMENT: ICD-10-CM

## 2018-10-18 DIAGNOSIS — I21.4 NON-ST ELEVATION MI (NSTEMI) (H): ICD-10-CM

## 2018-10-23 ENCOUNTER — AMBULATORY - HEALTHEAST (OUTPATIENT)
Dept: CARDIAC REHAB | Facility: CLINIC | Age: 63
End: 2018-10-23

## 2018-10-23 DIAGNOSIS — I21.4 NON-ST ELEVATION MI (NSTEMI) (H): ICD-10-CM

## 2018-10-23 DIAGNOSIS — Z95.5 S/P DRUG ELUTING CORONARY STENT PLACEMENT: ICD-10-CM

## 2018-10-25 ENCOUNTER — AMBULATORY - HEALTHEAST (OUTPATIENT)
Dept: CARDIAC REHAB | Facility: CLINIC | Age: 63
End: 2018-10-25

## 2018-10-25 DIAGNOSIS — I21.4 NON-ST ELEVATION MI (NSTEMI) (H): ICD-10-CM

## 2018-10-25 DIAGNOSIS — Z95.5 S/P DRUG ELUTING CORONARY STENT PLACEMENT: ICD-10-CM

## 2018-10-30 ENCOUNTER — AMBULATORY - HEALTHEAST (OUTPATIENT)
Dept: CARDIAC REHAB | Facility: CLINIC | Age: 63
End: 2018-10-30

## 2018-10-30 DIAGNOSIS — Z95.5 S/P DRUG ELUTING CORONARY STENT PLACEMENT: ICD-10-CM

## 2018-10-30 DIAGNOSIS — I21.4 NON-ST ELEVATION MI (NSTEMI) (H): ICD-10-CM

## 2018-11-01 ENCOUNTER — AMBULATORY - HEALTHEAST (OUTPATIENT)
Dept: CARDIAC REHAB | Facility: CLINIC | Age: 63
End: 2018-11-01

## 2018-11-01 DIAGNOSIS — I21.4 NON-ST ELEVATION MI (NSTEMI) (H): ICD-10-CM

## 2018-11-01 DIAGNOSIS — Z95.5 S/P DRUG ELUTING CORONARY STENT PLACEMENT: ICD-10-CM

## 2018-11-06 ENCOUNTER — AMBULATORY - HEALTHEAST (OUTPATIENT)
Dept: CARDIAC REHAB | Facility: CLINIC | Age: 63
End: 2018-11-06

## 2018-11-06 DIAGNOSIS — Z95.5 S/P DRUG ELUTING CORONARY STENT PLACEMENT: ICD-10-CM

## 2018-11-06 DIAGNOSIS — I21.4 NON-ST ELEVATION MI (NSTEMI) (H): ICD-10-CM

## 2018-11-08 ENCOUNTER — AMBULATORY - HEALTHEAST (OUTPATIENT)
Dept: CARDIAC REHAB | Facility: CLINIC | Age: 63
End: 2018-11-08

## 2018-11-08 DIAGNOSIS — Z95.5 S/P DRUG ELUTING CORONARY STENT PLACEMENT: ICD-10-CM

## 2018-11-08 DIAGNOSIS — I21.4 NON-ST ELEVATION MI (NSTEMI) (H): ICD-10-CM

## 2018-11-13 ENCOUNTER — COMMUNICATION - HEALTHEAST (OUTPATIENT)
Dept: FAMILY MEDICINE | Facility: CLINIC | Age: 63
End: 2018-11-13

## 2018-11-13 DIAGNOSIS — M54.50 LOWER BACK PAIN: ICD-10-CM

## 2018-12-03 ENCOUNTER — COMMUNICATION - HEALTHEAST (OUTPATIENT)
Dept: FAMILY MEDICINE | Facility: CLINIC | Age: 63
End: 2018-12-03

## 2018-12-03 DIAGNOSIS — M54.50 LOWER BACK PAIN: ICD-10-CM

## 2018-12-14 ENCOUNTER — COMMUNICATION - HEALTHEAST (OUTPATIENT)
Dept: FAMILY MEDICINE | Facility: CLINIC | Age: 63
End: 2018-12-14

## 2018-12-14 DIAGNOSIS — E78.5 HYPERLIPIDEMIA: ICD-10-CM

## 2019-01-02 ENCOUNTER — COMMUNICATION - HEALTHEAST (OUTPATIENT)
Dept: FAMILY MEDICINE | Facility: CLINIC | Age: 64
End: 2019-01-02

## 2019-01-02 DIAGNOSIS — M54.50 LOWER BACK PAIN: ICD-10-CM

## 2019-01-31 ENCOUNTER — COMMUNICATION - HEALTHEAST (OUTPATIENT)
Dept: SCHEDULING | Facility: CLINIC | Age: 64
End: 2019-01-31

## 2019-01-31 DIAGNOSIS — M54.50 LOWER BACK PAIN: ICD-10-CM

## 2019-02-01 ENCOUNTER — COMMUNICATION - HEALTHEAST (OUTPATIENT)
Dept: FAMILY MEDICINE | Facility: CLINIC | Age: 64
End: 2019-02-01

## 2019-02-01 DIAGNOSIS — I10 ESSENTIAL HYPERTENSION: ICD-10-CM

## 2019-03-04 ENCOUNTER — COMMUNICATION - HEALTHEAST (OUTPATIENT)
Dept: FAMILY MEDICINE | Facility: CLINIC | Age: 64
End: 2019-03-04

## 2019-03-04 DIAGNOSIS — M54.50 LOWER BACK PAIN: ICD-10-CM

## 2019-04-02 ENCOUNTER — COMMUNICATION - HEALTHEAST (OUTPATIENT)
Dept: FAMILY MEDICINE | Facility: CLINIC | Age: 64
End: 2019-04-02

## 2019-04-02 DIAGNOSIS — M54.50 LOWER BACK PAIN: ICD-10-CM

## 2019-04-29 ENCOUNTER — COMMUNICATION - HEALTHEAST (OUTPATIENT)
Dept: FAMILY MEDICINE | Facility: CLINIC | Age: 64
End: 2019-04-29

## 2019-04-29 DIAGNOSIS — K21.9 GERD (GASTROESOPHAGEAL REFLUX DISEASE): ICD-10-CM

## 2019-04-29 DIAGNOSIS — E78.5 HYPERLIPIDEMIA: ICD-10-CM

## 2019-05-14 ENCOUNTER — COMMUNICATION - HEALTHEAST (OUTPATIENT)
Dept: FAMILY MEDICINE | Facility: CLINIC | Age: 64
End: 2019-05-14

## 2019-05-14 DIAGNOSIS — M54.50 LOWER BACK PAIN: ICD-10-CM

## 2019-05-22 ENCOUNTER — COMMUNICATION - HEALTHEAST (OUTPATIENT)
Dept: FAMILY MEDICINE | Facility: CLINIC | Age: 64
End: 2019-05-22

## 2019-05-22 DIAGNOSIS — Z95.5 HX OF HEART ARTERY STENT: ICD-10-CM

## 2019-06-17 ENCOUNTER — COMMUNICATION - HEALTHEAST (OUTPATIENT)
Dept: FAMILY MEDICINE | Facility: CLINIC | Age: 64
End: 2019-06-17

## 2019-06-17 DIAGNOSIS — M54.50 LOWER BACK PAIN: ICD-10-CM

## 2019-07-26 ENCOUNTER — COMMUNICATION - HEALTHEAST (OUTPATIENT)
Dept: FAMILY MEDICINE | Facility: CLINIC | Age: 64
End: 2019-07-26

## 2019-07-26 DIAGNOSIS — E78.5 HYPERLIPIDEMIA: ICD-10-CM

## 2019-07-26 DIAGNOSIS — K21.9 GERD (GASTROESOPHAGEAL REFLUX DISEASE): ICD-10-CM

## 2019-07-29 ENCOUNTER — RECORDS - HEALTHEAST (OUTPATIENT)
Dept: ADMINISTRATIVE | Facility: OTHER | Age: 64
End: 2019-07-29

## 2019-08-01 ENCOUNTER — COMMUNICATION - HEALTHEAST (OUTPATIENT)
Dept: FAMILY MEDICINE | Facility: CLINIC | Age: 64
End: 2019-08-01

## 2019-08-01 ENCOUNTER — COMMUNICATION - HEALTHEAST (OUTPATIENT)
Dept: SCHEDULING | Facility: CLINIC | Age: 64
End: 2019-08-01

## 2019-08-01 DIAGNOSIS — K21.9 GASTROESOPHAGEAL REFLUX DISEASE WITHOUT ESOPHAGITIS: ICD-10-CM

## 2019-08-07 ENCOUNTER — COMMUNICATION - HEALTHEAST (OUTPATIENT)
Dept: SCHEDULING | Facility: CLINIC | Age: 64
End: 2019-08-07

## 2019-08-07 ENCOUNTER — OFFICE VISIT - HEALTHEAST (OUTPATIENT)
Dept: FAMILY MEDICINE | Facility: CLINIC | Age: 64
End: 2019-08-07

## 2019-08-07 DIAGNOSIS — I21.3 ACUTE ST ELEVATION MYOCARDIAL INFARCTION (STEMI), UNSPECIFIED ARTERY (H): ICD-10-CM

## 2019-08-07 DIAGNOSIS — M79.605 BILATERAL LEG PAIN: ICD-10-CM

## 2019-08-07 DIAGNOSIS — Z79.899 CONTROLLED SUBSTANCE AGREEMENT SIGNED: ICD-10-CM

## 2019-08-07 DIAGNOSIS — Z12.11 SCREEN FOR COLON CANCER: ICD-10-CM

## 2019-08-07 DIAGNOSIS — J98.6 DISORDERS OF DIAPHRAGM: ICD-10-CM

## 2019-08-07 DIAGNOSIS — R19.5 POSITIVE COLORECTAL CANCER SCREENING USING DNA-BASED STOOL TEST: ICD-10-CM

## 2019-08-07 DIAGNOSIS — M54.50 LOWER BACK PAIN: ICD-10-CM

## 2019-08-07 DIAGNOSIS — M79.604 BILATERAL LEG PAIN: ICD-10-CM

## 2019-08-07 LAB
ALBUMIN SERPL-MCNC: 2.8 G/DL (ref 3.5–5)
ALP SERPL-CCNC: 153 U/L (ref 45–120)
ALT SERPL W P-5'-P-CCNC: <9 U/L (ref 0–45)
ANION GAP SERPL CALCULATED.3IONS-SCNC: 12 MMOL/L (ref 5–18)
AST SERPL W P-5'-P-CCNC: 18 U/L (ref 0–40)
BILIRUB SERPL-MCNC: 0.5 MG/DL (ref 0–1)
BUN SERPL-MCNC: 8 MG/DL (ref 8–22)
CALCIUM SERPL-MCNC: 8.9 MG/DL (ref 8.5–10.5)
CHLORIDE BLD-SCNC: 99 MMOL/L (ref 98–107)
CHOLEST SERPL-MCNC: 104 MG/DL
CO2 SERPL-SCNC: 28 MMOL/L (ref 22–31)
CREAT SERPL-MCNC: 0.63 MG/DL (ref 0.7–1.3)
ERYTHROCYTE [DISTWIDTH] IN BLOOD BY AUTOMATED COUNT: 21.2 % (ref 11–14.5)
FASTING STATUS PATIENT QL REPORTED: YES
GFR SERPL CREATININE-BSD FRML MDRD: >60 ML/MIN/1.73M2
GLUCOSE BLD-MCNC: 104 MG/DL (ref 70–125)
HCT VFR BLD AUTO: 24.5 % (ref 40–54)
HDLC SERPL-MCNC: 55 MG/DL
HGB BLD-MCNC: 6 G/DL (ref 14–18)
LDLC SERPL CALC-MCNC: 36 MG/DL
MCH RBC QN AUTO: 16.9 PG (ref 27–34)
MCHC RBC AUTO-ENTMCNC: 24.5 G/DL (ref 32–36)
MCV RBC AUTO: 69 FL (ref 80–100)
PLATELET # BLD AUTO: 446 THOU/UL (ref 140–440)
PMV BLD AUTO: 9.5 FL (ref 8.5–12.5)
POTASSIUM BLD-SCNC: 4 MMOL/L (ref 3.5–5)
PROT SERPL-MCNC: 5.9 G/DL (ref 6–8)
RBC # BLD AUTO: 3.55 MILL/UL (ref 4.4–6.2)
SODIUM SERPL-SCNC: 139 MMOL/L (ref 136–145)
TRIGL SERPL-MCNC: 65 MG/DL
WBC: 12.6 THOU/UL (ref 4–11)

## 2019-08-08 ENCOUNTER — RECORDS - HEALTHEAST (OUTPATIENT)
Dept: ADMINISTRATIVE | Facility: OTHER | Age: 64
End: 2019-08-08

## 2019-08-09 ENCOUNTER — RECORDS - HEALTHEAST (OUTPATIENT)
Dept: ADMINISTRATIVE | Facility: OTHER | Age: 64
End: 2019-08-09

## 2019-08-10 ENCOUNTER — RECORDS - HEALTHEAST (OUTPATIENT)
Dept: ADMINISTRATIVE | Facility: OTHER | Age: 64
End: 2019-08-10

## 2019-08-11 ENCOUNTER — RECORDS - HEALTHEAST (OUTPATIENT)
Dept: ADMINISTRATIVE | Facility: OTHER | Age: 64
End: 2019-08-11

## 2019-08-12 ENCOUNTER — SURGERY - HEALTHEAST (OUTPATIENT)
Dept: CARDIOLOGY | Facility: CLINIC | Age: 64
End: 2019-08-12

## 2019-08-12 ENCOUNTER — ANESTHESIA - HEALTHEAST (OUTPATIENT)
Dept: SURGERY | Facility: CLINIC | Age: 64
End: 2019-08-12

## 2019-08-12 ASSESSMENT — MIFFLIN-ST. JEOR
SCORE: 1571.9
SCORE: 1571.9
SCORE: 1560.56
SCORE: 1560.56
SCORE: 1571.9
SCORE: 1560.56

## 2019-08-13 ENCOUNTER — SURGERY - HEALTHEAST (OUTPATIENT)
Dept: SURGERY | Facility: CLINIC | Age: 64
End: 2019-08-13

## 2019-08-14 ENCOUNTER — AMBULATORY - HEALTHEAST (OUTPATIENT)
Dept: FAMILY MEDICINE | Facility: CLINIC | Age: 64
End: 2019-08-14

## 2019-08-14 DIAGNOSIS — C18.9 MUCINOUS ADENOCARCINOMA OF COLON (H): ICD-10-CM

## 2019-08-14 ASSESSMENT — MIFFLIN-ST. JEOR
SCORE: 1503.86

## 2019-08-16 ASSESSMENT — MIFFLIN-ST. JEOR
SCORE: 1490.26

## 2019-08-17 ASSESSMENT — MIFFLIN-ST. JEOR
SCORE: 1483.9

## 2019-08-18 ASSESSMENT — MIFFLIN-ST. JEOR
SCORE: 1490.26

## 2019-08-19 ENCOUNTER — SURGERY - HEALTHEAST (OUTPATIENT)
Dept: SURGERY | Facility: CLINIC | Age: 64
End: 2019-08-19

## 2019-08-19 ENCOUNTER — ANESTHESIA - HEALTHEAST (OUTPATIENT)
Dept: SURGERY | Facility: CLINIC | Age: 64
End: 2019-08-19

## 2019-08-19 ASSESSMENT — MIFFLIN-ST. JEOR
SCORE: 1490.26

## 2019-08-20 ASSESSMENT — MIFFLIN-ST. JEOR
SCORE: 1496.61

## 2019-08-21 ASSESSMENT — MIFFLIN-ST. JEOR
SCORE: 1497.06

## 2019-08-23 ASSESSMENT — MIFFLIN-ST. JEOR
SCORE: 1467.58

## 2019-08-24 ASSESSMENT — MIFFLIN-ST. JEOR
SCORE: 1458.5

## 2019-08-25 ASSESSMENT — MIFFLIN-ST. JEOR
SCORE: 1458.5

## 2019-08-27 ENCOUNTER — COMMUNICATION - HEALTHEAST (OUTPATIENT)
Dept: CARE COORDINATION | Facility: CLINIC | Age: 64
End: 2019-08-27

## 2019-08-29 ENCOUNTER — AMBULATORY - HEALTHEAST (OUTPATIENT)
Dept: PHARMACY | Facility: CLINIC | Age: 64
End: 2019-08-29

## 2019-08-29 DIAGNOSIS — J18.9 PNEUMONIA DUE TO INFECTIOUS ORGANISM, UNSPECIFIED LATERALITY, UNSPECIFIED PART OF LUNG: ICD-10-CM

## 2019-08-29 DIAGNOSIS — K21.9 GASTROESOPHAGEAL REFLUX DISEASE WITHOUT ESOPHAGITIS: ICD-10-CM

## 2019-08-29 DIAGNOSIS — I10 ESSENTIAL HYPERTENSION: ICD-10-CM

## 2019-08-29 DIAGNOSIS — I26.99 OTHER ACUTE PULMONARY EMBOLISM WITHOUT ACUTE COR PULMONALE (H): ICD-10-CM

## 2019-08-29 DIAGNOSIS — I21.4 NSTEMI (NON-ST ELEVATED MYOCARDIAL INFARCTION) (H): ICD-10-CM

## 2019-08-29 DIAGNOSIS — D64.9 ANEMIA, UNSPECIFIED TYPE: ICD-10-CM

## 2019-08-30 ENCOUNTER — COMMUNICATION - HEALTHEAST (OUTPATIENT)
Dept: FAMILY MEDICINE | Facility: CLINIC | Age: 64
End: 2019-08-30

## 2019-08-30 ENCOUNTER — OFFICE VISIT - HEALTHEAST (OUTPATIENT)
Dept: FAMILY MEDICINE | Facility: CLINIC | Age: 64
End: 2019-08-30

## 2019-08-30 ENCOUNTER — AMBULATORY - HEALTHEAST (OUTPATIENT)
Dept: ONCOLOGY | Facility: HOSPITAL | Age: 64
End: 2019-08-30

## 2019-08-30 DIAGNOSIS — I25.2 HISTORY OF ST ELEVATION MYOCARDIAL INFARCTION (STEMI): ICD-10-CM

## 2019-08-30 DIAGNOSIS — I25.84 CORONARY ARTERY DISEASE DUE TO CALCIFIED CORONARY LESION: ICD-10-CM
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
## 2019-08-30 DIAGNOSIS — I82.432 DEEP VEIN THROMBOSIS (DVT) OF POPLITEAL VEIN OF LEFT LOWER EXTREMITY, UNSPECIFIED CHRONICITY (H): ICD-10-CM

## 2019-08-30 DIAGNOSIS — J18.9 HOSPITAL-ACQUIRED PNEUMONIA: ICD-10-CM

## 2019-08-30 DIAGNOSIS — I26.99 OTHER ACUTE PULMONARY EMBOLISM WITHOUT ACUTE COR PULMONALE (H): ICD-10-CM

## 2019-08-30 DIAGNOSIS — C18.9 MUCINOUS ADENOCARCINOMA OF COLON (H): ICD-10-CM

## 2019-08-30 DIAGNOSIS — F10.10 ALCOHOL ABUSE: ICD-10-CM

## 2019-08-30 DIAGNOSIS — I25.10 CORONARY ARTERY DISEASE DUE TO CALCIFIED CORONARY LESION: ICD-10-CM

## 2019-08-30 DIAGNOSIS — M51.379 DEGENERATION OF LUMBAR OR LUMBOSACRAL INTERVERTEBRAL DISC: ICD-10-CM

## 2019-08-30 DIAGNOSIS — D64.9 ANEMIA, UNSPECIFIED TYPE: ICD-10-CM

## 2019-08-30 LAB
ALBUMIN SERPL-MCNC: 3.5 G/DL (ref 3.5–5)
ALP SERPL-CCNC: 274 U/L (ref 45–120)
ALT SERPL W P-5'-P-CCNC: 31 U/L (ref 0–45)
ANION GAP SERPL CALCULATED.3IONS-SCNC: 10 MMOL/L (ref 5–18)
AST SERPL W P-5'-P-CCNC: 31 U/L (ref 0–40)
BASOPHILS # BLD AUTO: 0 THOU/UL (ref 0–0.2)
BASOPHILS NFR BLD AUTO: 0 % (ref 0–2)
BILIRUB SERPL-MCNC: 0.4 MG/DL (ref 0–1)
BUN SERPL-MCNC: 7 MG/DL (ref 8–22)
CALCIUM SERPL-MCNC: 9.5 MG/DL (ref 8.5–10.5)
CHLORIDE BLD-SCNC: 104 MMOL/L (ref 98–107)
CO2 SERPL-SCNC: 25 MMOL/L (ref 22–31)
CREAT SERPL-MCNC: 0.74 MG/DL (ref 0.7–1.3)
EOSINOPHIL # BLD AUTO: 0.4 THOU/UL (ref 0–0.4)
EOSINOPHIL NFR BLD AUTO: 6 % (ref 0–6)
ERYTHROCYTE [DISTWIDTH] IN BLOOD BY AUTOMATED COUNT: 22 % (ref 11–14.5)
GFR SERPL CREATININE-BSD FRML MDRD: >60 ML/MIN/1.73M2
GLUCOSE BLD-MCNC: 99 MG/DL (ref 70–125)
HCT VFR BLD AUTO: 25.4 % (ref 40–54)
HGB BLD-MCNC: 8 G/DL (ref 14–18)
LYMPHOCYTES # BLD AUTO: 2.3 THOU/UL (ref 0.8–4.4)
LYMPHOCYTES NFR BLD AUTO: 33 % (ref 20–40)
MCH RBC QN AUTO: 21.7 PG (ref 27–34)
MCHC RBC AUTO-ENTMCNC: 31.4 G/DL (ref 32–36)
MCV RBC AUTO: 69 FL (ref 80–100)
MONOCYTES # BLD AUTO: 0.7 THOU/UL (ref 0–0.9)
MONOCYTES NFR BLD AUTO: 10 % (ref 2–10)
NEUTROPHILS # BLD AUTO: 3.5 THOU/UL (ref 2–7.7)
NEUTROPHILS NFR BLD AUTO: 51 % (ref 50–70)
PLATELET # BLD AUTO: 477 THOU/UL (ref 140–440)
PMV BLD AUTO: 7 FL (ref 7–10)
POTASSIUM BLD-SCNC: 4.6 MMOL/L (ref 3.5–5)
PROT SERPL-MCNC: 6.5 G/DL (ref 6–8)
RBC # BLD AUTO: 3.68 MILL/UL (ref 4.4–6.2)
SODIUM SERPL-SCNC: 139 MMOL/L (ref 136–145)
WBC: 6.8 THOU/UL (ref 4–11)

## 2019-08-30 ASSESSMENT — MIFFLIN-ST. JEOR: SCORE: 1485.72

## 2019-09-16 ENCOUNTER — OFFICE VISIT - HEALTHEAST (OUTPATIENT)
Dept: ONCOLOGY | Facility: CLINIC | Age: 64
End: 2019-09-16

## 2019-09-16 ENCOUNTER — COMMUNICATION - HEALTHEAST (OUTPATIENT)
Dept: FAMILY MEDICINE | Facility: CLINIC | Age: 64
End: 2019-09-16

## 2019-09-16 ENCOUNTER — AMBULATORY - HEALTHEAST (OUTPATIENT)
Dept: FAMILY MEDICINE | Facility: CLINIC | Age: 64
End: 2019-09-16

## 2019-09-16 DIAGNOSIS — I25.10 CORONARY ARTERY DISEASE DUE TO CALCIFIED CORONARY LESION: ICD-10-CM

## 2019-09-16 DIAGNOSIS — J84.112 IPF (IDIOPATHIC PULMONARY FIBROSIS) (H): ICD-10-CM

## 2019-09-16 DIAGNOSIS — I25.84 CORONARY ARTERY DISEASE DUE TO CALCIFIED CORONARY LESION: ICD-10-CM

## 2019-09-16 DIAGNOSIS — C18.2 PRIMARY ADENOCARCINOMA OF ASCENDING COLON (H): ICD-10-CM

## 2019-09-16 DIAGNOSIS — K21.9 GASTROESOPHAGEAL REFLUX DISEASE WITHOUT ESOPHAGITIS: ICD-10-CM

## 2019-09-16 DIAGNOSIS — E78.5 HYPERLIPIDEMIA: ICD-10-CM

## 2019-09-16 DIAGNOSIS — I26.99 OTHER ACUTE PULMONARY EMBOLISM WITHOUT ACUTE COR PULMONALE (H): ICD-10-CM

## 2019-09-16 DIAGNOSIS — D50.0 IRON DEFICIENCY ANEMIA DUE TO CHRONIC BLOOD LOSS: ICD-10-CM

## 2019-09-16 DIAGNOSIS — Z95.5 HX OF HEART ARTERY STENT: ICD-10-CM

## 2019-09-16 DIAGNOSIS — M51.379 DEGENERATION OF LUMBAR OR LUMBOSACRAL INTERVERTEBRAL DISC: ICD-10-CM

## 2019-09-16 DIAGNOSIS — I10 ESSENTIAL HYPERTENSION: ICD-10-CM

## 2019-09-16 ASSESSMENT — MIFFLIN-ST. JEOR: SCORE: 1483.11

## 2019-09-17 ENCOUNTER — HOSPITAL ENCOUNTER (OUTPATIENT)
Dept: INTERVENTIONAL RADIOLOGY/VASCULAR | Facility: HOSPITAL | Age: 64
Setting detail: RADIATION/ONCOLOGY SERIES
Discharge: STILL ADMITTED OR ADMINISTRATIVE | End: 2019-09-17
Attending: INTERNAL MEDICINE

## 2019-09-20 ENCOUNTER — COMMUNICATION - HEALTHEAST (OUTPATIENT)
Dept: FAMILY MEDICINE | Facility: CLINIC | Age: 64
End: 2019-09-20

## 2019-09-20 DIAGNOSIS — I21.4 NSTEMI (NON-ST ELEVATED MYOCARDIAL INFARCTION) (H): ICD-10-CM

## 2019-09-26 ENCOUNTER — AMBULATORY - HEALTHEAST (OUTPATIENT)
Dept: ONCOLOGY | Facility: HOSPITAL | Age: 64
End: 2019-09-26

## 2019-09-26 DIAGNOSIS — C18.2 PRIMARY ADENOCARCINOMA OF ASCENDING COLON (H): ICD-10-CM

## 2019-09-27 ENCOUNTER — AMBULATORY - HEALTHEAST (OUTPATIENT)
Dept: ONCOLOGY | Facility: HOSPITAL | Age: 64
End: 2019-09-27

## 2019-09-27 DIAGNOSIS — C18.2 PRIMARY ADENOCARCINOMA OF ASCENDING COLON (H): ICD-10-CM

## 2019-10-01 ENCOUNTER — AMBULATORY - HEALTHEAST (OUTPATIENT)
Dept: ONCOLOGY | Facility: HOSPITAL | Age: 64
End: 2019-10-01

## 2019-10-01 DIAGNOSIS — I26.99 OTHER ACUTE PULMONARY EMBOLISM WITHOUT ACUTE COR PULMONALE (H): ICD-10-CM

## 2019-10-02 ENCOUNTER — OFFICE VISIT - HEALTHEAST (OUTPATIENT)
Dept: PULMONOLOGY | Facility: OTHER | Age: 64
End: 2019-10-02

## 2019-10-02 DIAGNOSIS — J84.9 ILD (INTERSTITIAL LUNG DISEASE) (H): ICD-10-CM

## 2019-10-02 DIAGNOSIS — G70.9 NEUROMUSCULAR WEAKNESS (H): ICD-10-CM

## 2019-10-04 ENCOUNTER — HOSPITAL ENCOUNTER (OUTPATIENT)
Dept: INTERVENTIONAL RADIOLOGY/VASCULAR | Facility: HOSPITAL | Age: 64
Setting detail: RADIATION/ONCOLOGY SERIES
Discharge: STILL ADMITTED OR ADMINISTRATIVE | End: 2019-10-04
Attending: INTERNAL MEDICINE

## 2019-10-04 DIAGNOSIS — I82.432 DEEP VEIN THROMBOSIS (DVT) OF POPLITEAL VEIN OF LEFT LOWER EXTREMITY, UNSPECIFIED CHRONICITY (H): ICD-10-CM

## 2019-10-04 DIAGNOSIS — I26.99 OTHER ACUTE PULMONARY EMBOLISM WITHOUT ACUTE COR PULMONALE (H): ICD-10-CM

## 2019-10-04 LAB
CREAT SERPL-MCNC: 0.74 MG/DL (ref 0.7–1.3)
GFR SERPL CREATININE-BSD FRML MDRD: >60 ML/MIN/1.73M2

## 2019-10-04 ASSESSMENT — MIFFLIN-ST. JEOR: SCORE: 1504.43

## 2019-10-10 ENCOUNTER — COMMUNICATION - HEALTHEAST (OUTPATIENT)
Dept: FAMILY MEDICINE | Facility: CLINIC | Age: 64
End: 2019-10-10

## 2019-10-10 DIAGNOSIS — I26.99 OTHER ACUTE PULMONARY EMBOLISM WITHOUT ACUTE COR PULMONALE (H): ICD-10-CM

## 2019-10-14 ENCOUNTER — HOSPITAL ENCOUNTER (OUTPATIENT)
Dept: RESPIRATORY THERAPY | Facility: CLINIC | Age: 64
Discharge: HOME | End: 2019-10-14
Attending: INTERNAL MEDICINE

## 2019-10-14 DIAGNOSIS — G70.9 NEUROMUSCULAR WEAKNESS (H): ICD-10-CM

## 2019-10-14 DIAGNOSIS — J84.9 ILD (INTERSTITIAL LUNG DISEASE) (H): ICD-10-CM

## 2019-10-14 LAB — HGB BLD-MCNC: 10.2 G/DL (ref 14–18)

## 2019-10-16 ENCOUNTER — COMMUNICATION - HEALTHEAST (OUTPATIENT)
Dept: FAMILY MEDICINE | Facility: CLINIC | Age: 64
End: 2019-10-16

## 2019-10-16 DIAGNOSIS — M51.379 DEGENERATION OF LUMBAR OR LUMBOSACRAL INTERVERTEBRAL DISC: ICD-10-CM

## 2019-11-14 ENCOUNTER — COMMUNICATION - HEALTHEAST (OUTPATIENT)
Dept: FAMILY MEDICINE | Facility: CLINIC | Age: 64
End: 2019-11-14

## 2019-11-14 DIAGNOSIS — M51.379 DEGENERATION OF LUMBAR OR LUMBOSACRAL INTERVERTEBRAL DISC: ICD-10-CM

## 2019-11-18 ENCOUNTER — OFFICE VISIT - HEALTHEAST (OUTPATIENT)
Dept: CARDIOLOGY | Facility: CLINIC | Age: 64
End: 2019-11-18

## 2019-11-18 DIAGNOSIS — I10 ESSENTIAL HYPERTENSION: ICD-10-CM

## 2019-11-18 DIAGNOSIS — I82.432 DEEP VEIN THROMBOSIS (DVT) OF POPLITEAL VEIN OF LEFT LOWER EXTREMITY, UNSPECIFIED CHRONICITY (H): ICD-10-CM

## 2019-11-18 DIAGNOSIS — E78.5 HYPERLIPIDEMIA: ICD-10-CM

## 2019-11-18 DIAGNOSIS — I25.5 ISCHEMIC CARDIOMYOPATHY: ICD-10-CM

## 2019-11-18 DIAGNOSIS — J98.6 DISORDERS OF DIAPHRAGM: ICD-10-CM

## 2019-11-18 DIAGNOSIS — I25.10 CORONARY ARTERY DISEASE INVOLVING NATIVE CORONARY ARTERY OF NATIVE HEART WITHOUT ANGINA PECTORIS: ICD-10-CM

## 2019-11-18 ASSESSMENT — MIFFLIN-ST. JEOR: SCORE: 1549.79

## 2019-11-25 ENCOUNTER — OFFICE VISIT - HEALTHEAST (OUTPATIENT)
Dept: ONCOLOGY | Facility: CLINIC | Age: 64
End: 2019-11-25

## 2019-11-25 ENCOUNTER — AMBULATORY - HEALTHEAST (OUTPATIENT)
Dept: ONCOLOGY | Facility: CLINIC | Age: 64
End: 2019-11-25

## 2019-11-25 ENCOUNTER — AMBULATORY - HEALTHEAST (OUTPATIENT)
Dept: INFUSION THERAPY | Facility: CLINIC | Age: 64
End: 2019-11-25

## 2019-11-25 DIAGNOSIS — C18.2 PRIMARY ADENOCARCINOMA OF ASCENDING COLON (H): ICD-10-CM

## 2019-11-25 DIAGNOSIS — D50.0 IRON DEFICIENCY ANEMIA DUE TO CHRONIC BLOOD LOSS: ICD-10-CM

## 2019-11-25 DIAGNOSIS — I27.82 OTHER CHRONIC PULMONARY EMBOLISM WITHOUT ACUTE COR PULMONALE (H): ICD-10-CM

## 2019-11-25 LAB
ALBUMIN SERPL-MCNC: 4 G/DL (ref 3.5–5)
ALP SERPL-CCNC: 268 U/L (ref 45–120)
ALT SERPL W P-5'-P-CCNC: 57 U/L (ref 0–45)
ANION GAP SERPL CALCULATED.3IONS-SCNC: 11 MMOL/L (ref 5–18)
AST SERPL W P-5'-P-CCNC: 63 U/L (ref 0–40)
BASOPHILS # BLD AUTO: 0.1 THOU/UL (ref 0–0.2)
BASOPHILS NFR BLD AUTO: 1 % (ref 0–2)
BILIRUB SERPL-MCNC: 0.6 MG/DL (ref 0–1)
BUN SERPL-MCNC: 9 MG/DL (ref 8–22)
CALCIUM SERPL-MCNC: 10 MG/DL (ref 8.5–10.5)
CEA SERPL-MCNC: 3.3 NG/ML (ref 0–3)
CHLORIDE BLD-SCNC: 102 MMOL/L (ref 98–107)
CO2 SERPL-SCNC: 27 MMOL/L (ref 22–31)
CREAT SERPL-MCNC: 0.84 MG/DL (ref 0.7–1.3)
EOSINOPHIL # BLD AUTO: 0.2 THOU/UL (ref 0–0.4)
EOSINOPHIL NFR BLD AUTO: 3 % (ref 0–6)
ERYTHROCYTE [DISTWIDTH] IN BLOOD BY AUTOMATED COUNT: 19.9 % (ref 11–14.5)
GFR SERPL CREATININE-BSD FRML MDRD: >60 ML/MIN/1.73M2
GLUCOSE BLD-MCNC: 128 MG/DL (ref 70–125)
HCT VFR BLD AUTO: 45.9 % (ref 40–54)
HGB BLD-MCNC: 14.5 G/DL (ref 14–18)
LYMPHOCYTES # BLD AUTO: 2.5 THOU/UL (ref 0.8–4.4)
LYMPHOCYTES NFR BLD AUTO: 31 % (ref 20–40)
MCH RBC QN AUTO: 28 PG (ref 27–34)
MCHC RBC AUTO-ENTMCNC: 31.6 G/DL (ref 32–36)
MCV RBC AUTO: 89 FL (ref 80–100)
MONOCYTES # BLD AUTO: 0.7 THOU/UL (ref 0–0.9)
MONOCYTES NFR BLD AUTO: 8 % (ref 2–10)
NEUTROPHILS # BLD AUTO: 4.5 THOU/UL (ref 2–7.7)
NEUTROPHILS NFR BLD AUTO: 57 % (ref 50–70)
PLATELET # BLD AUTO: 219 THOU/UL (ref 140–440)
PMV BLD AUTO: 8.9 FL (ref 8.5–12.5)
POTASSIUM BLD-SCNC: 4.3 MMOL/L (ref 3.5–5)
PROT SERPL-MCNC: 7.1 G/DL (ref 6–8)
RBC # BLD AUTO: 5.18 MILL/UL (ref 4.4–6.2)
SODIUM SERPL-SCNC: 140 MMOL/L (ref 136–145)
WBC: 7.9 THOU/UL (ref 4–11)

## 2019-11-26 ENCOUNTER — COMMUNICATION - HEALTHEAST (OUTPATIENT)
Dept: ONCOLOGY | Facility: CLINIC | Age: 64
End: 2019-11-26

## 2019-12-16 ENCOUNTER — COMMUNICATION - HEALTHEAST (OUTPATIENT)
Dept: FAMILY MEDICINE | Facility: CLINIC | Age: 64
End: 2019-12-16

## 2019-12-16 DIAGNOSIS — M51.379 DEGENERATION OF LUMBAR OR LUMBOSACRAL INTERVERTEBRAL DISC: ICD-10-CM

## 2020-01-16 ENCOUNTER — COMMUNICATION - HEALTHEAST (OUTPATIENT)
Dept: FAMILY MEDICINE | Facility: CLINIC | Age: 65
End: 2020-01-16

## 2020-01-16 DIAGNOSIS — Q79.1 HEMIDIAPHRAGMATIC EVENTRATION: ICD-10-CM

## 2020-01-16 DIAGNOSIS — F10.10 ALCOHOL ABUSE: ICD-10-CM

## 2020-01-27 ENCOUNTER — COMMUNICATION - HEALTHEAST (OUTPATIENT)
Dept: FAMILY MEDICINE | Facility: CLINIC | Age: 65
End: 2020-01-27

## 2020-01-27 DIAGNOSIS — M51.379 DEGENERATION OF LUMBAR OR LUMBOSACRAL INTERVERTEBRAL DISC: ICD-10-CM

## 2020-02-26 ENCOUNTER — COMMUNICATION - HEALTHEAST (OUTPATIENT)
Dept: FAMILY MEDICINE | Facility: CLINIC | Age: 65
End: 2020-02-26

## 2020-02-26 DIAGNOSIS — M51.379 DEGENERATION OF LUMBAR OR LUMBOSACRAL INTERVERTEBRAL DISC: ICD-10-CM

## 2020-03-05 ENCOUNTER — COMMUNICATION - HEALTHEAST (OUTPATIENT)
Dept: FAMILY MEDICINE | Facility: CLINIC | Age: 65
End: 2020-03-05

## 2020-03-05 DIAGNOSIS — I21.4 NSTEMI (NON-ST ELEVATED MYOCARDIAL INFARCTION) (H): ICD-10-CM

## 2020-03-12 ENCOUNTER — COMMUNICATION - HEALTHEAST (OUTPATIENT)
Dept: ONCOLOGY | Facility: CLINIC | Age: 65
End: 2020-03-12

## 2020-03-20 ENCOUNTER — AMBULATORY - HEALTHEAST (OUTPATIENT)
Dept: INFUSION THERAPY | Facility: CLINIC | Age: 65
End: 2020-03-20

## 2020-03-20 ENCOUNTER — HOSPITAL ENCOUNTER (OUTPATIENT)
Dept: CT IMAGING | Facility: CLINIC | Age: 65
Setting detail: RADIATION/ONCOLOGY SERIES
Discharge: STILL ADMITTED OR ADMINISTRATIVE | End: 2020-03-20
Attending: INTERNAL MEDICINE

## 2020-03-20 DIAGNOSIS — C18.2 PRIMARY ADENOCARCINOMA OF ASCENDING COLON (H): ICD-10-CM

## 2020-03-20 LAB
BASOPHILS # BLD AUTO: 0.1 THOU/UL (ref 0–0.2)
BASOPHILS NFR BLD AUTO: 1 % (ref 0–2)
CREAT BLD-MCNC: 0.7 MG/DL (ref 0.7–1.3)
EOSINOPHIL # BLD AUTO: 0.4 THOU/UL (ref 0–0.4)
EOSINOPHIL NFR BLD AUTO: 5 % (ref 0–6)
ERYTHROCYTE [DISTWIDTH] IN BLOOD BY AUTOMATED COUNT: 12.7 % (ref 11–14.5)
GFR SERPL CREATININE-BSD FRML MDRD: >60 ML/MIN/1.73M2
HCT VFR BLD AUTO: 50.3 % (ref 40–54)
HGB BLD-MCNC: 16.7 G/DL (ref 14–18)
LYMPHOCYTES # BLD AUTO: 1.9 THOU/UL (ref 0.8–4.4)
LYMPHOCYTES NFR BLD AUTO: 27 % (ref 20–40)
MCH RBC QN AUTO: 31.6 PG (ref 27–34)
MCHC RBC AUTO-ENTMCNC: 33.2 G/DL (ref 32–36)
MCV RBC AUTO: 95 FL (ref 80–100)
MONOCYTES # BLD AUTO: 0.6 THOU/UL (ref 0–0.9)
MONOCYTES NFR BLD AUTO: 8 % (ref 2–10)
NEUTROPHILS # BLD AUTO: 4.2 THOU/UL (ref 2–7.7)
NEUTROPHILS NFR BLD AUTO: 59 % (ref 50–70)
PLATELET # BLD AUTO: 221 THOU/UL (ref 140–440)
PMV BLD AUTO: 9.8 FL (ref 8.5–12.5)
RBC # BLD AUTO: 5.28 MILL/UL (ref 4.4–6.2)
WBC: 7.2 THOU/UL (ref 4–11)

## 2020-03-24 ENCOUNTER — OFFICE VISIT - HEALTHEAST (OUTPATIENT)
Dept: ONCOLOGY | Facility: HOSPITAL | Age: 65
End: 2020-03-24

## 2020-03-24 DIAGNOSIS — C18.2 PRIMARY ADENOCARCINOMA OF ASCENDING COLON (H): ICD-10-CM

## 2020-03-25 ENCOUNTER — COMMUNICATION - HEALTHEAST (OUTPATIENT)
Dept: ONCOLOGY | Facility: HOSPITAL | Age: 65
End: 2020-03-25

## 2020-03-31 ENCOUNTER — AMBULATORY - HEALTHEAST (OUTPATIENT)
Dept: INFUSION THERAPY | Facility: CLINIC | Age: 65
End: 2020-03-31

## 2020-03-31 ENCOUNTER — AMBULATORY - HEALTHEAST (OUTPATIENT)
Dept: ONCOLOGY | Facility: HOSPITAL | Age: 65
End: 2020-03-31

## 2020-03-31 DIAGNOSIS — C18.2 PRIMARY ADENOCARCINOMA OF ASCENDING COLON (H): ICD-10-CM

## 2020-03-31 LAB
ALBUMIN SERPL-MCNC: 4.1 G/DL (ref 3.5–5)
ALP SERPL-CCNC: 210 U/L (ref 45–120)
ALT SERPL W P-5'-P-CCNC: 52 U/L (ref 0–45)
ANION GAP SERPL CALCULATED.3IONS-SCNC: 11 MMOL/L (ref 5–18)
AST SERPL W P-5'-P-CCNC: 47 U/L (ref 0–40)
BASOPHILS # BLD AUTO: 0.1 THOU/UL (ref 0–0.2)
BASOPHILS NFR BLD AUTO: 1 % (ref 0–2)
BILIRUB SERPL-MCNC: 1 MG/DL (ref 0–1)
BUN SERPL-MCNC: 17 MG/DL (ref 8–22)
CALCIUM SERPL-MCNC: 10 MG/DL (ref 8.5–10.5)
CEA SERPL-MCNC: 4.5 NG/ML (ref 0–3)
CHLORIDE BLD-SCNC: 100 MMOL/L (ref 98–107)
CO2 SERPL-SCNC: 27 MMOL/L (ref 22–31)
CREAT SERPL-MCNC: 0.83 MG/DL (ref 0.7–1.3)
EOSINOPHIL # BLD AUTO: 0.3 THOU/UL (ref 0–0.4)
EOSINOPHIL NFR BLD AUTO: 3 % (ref 0–6)
ERYTHROCYTE [DISTWIDTH] IN BLOOD BY AUTOMATED COUNT: 12.7 % (ref 11–14.5)
GFR SERPL CREATININE-BSD FRML MDRD: >60 ML/MIN/1.73M2
GLUCOSE BLD-MCNC: 104 MG/DL (ref 70–125)
HCT VFR BLD AUTO: 46.6 % (ref 40–54)
HGB BLD-MCNC: 15.6 G/DL (ref 14–18)
LYMPHOCYTES # BLD AUTO: 2.5 THOU/UL (ref 0.8–4.4)
LYMPHOCYTES NFR BLD AUTO: 30 % (ref 20–40)
MCH RBC QN AUTO: 31.6 PG (ref 27–34)
MCHC RBC AUTO-ENTMCNC: 33.5 G/DL (ref 32–36)
MCV RBC AUTO: 95 FL (ref 80–100)
MONOCYTES # BLD AUTO: 0.8 THOU/UL (ref 0–0.9)
MONOCYTES NFR BLD AUTO: 10 % (ref 2–10)
NEUTROPHILS # BLD AUTO: 4.6 THOU/UL (ref 2–7.7)
NEUTROPHILS NFR BLD AUTO: 56 % (ref 50–70)
PLATELET # BLD AUTO: 221 THOU/UL (ref 140–440)
PMV BLD AUTO: 9 FL (ref 8.5–12.5)
POTASSIUM BLD-SCNC: 4.2 MMOL/L (ref 3.5–5)
PROT SERPL-MCNC: 7.2 G/DL (ref 6–8)
RBC # BLD AUTO: 4.93 MILL/UL (ref 4.4–6.2)
SODIUM SERPL-SCNC: 138 MMOL/L (ref 136–145)
WBC: 8.2 THOU/UL (ref 4–11)

## 2020-04-01 ENCOUNTER — AMBULATORY - HEALTHEAST (OUTPATIENT)
Dept: ONCOLOGY | Facility: HOSPITAL | Age: 65
End: 2020-04-01

## 2020-04-01 DIAGNOSIS — C18.2 PRIMARY ADENOCARCINOMA OF ASCENDING COLON (H): ICD-10-CM

## 2020-04-02 ENCOUNTER — COMMUNICATION - HEALTHEAST (OUTPATIENT)
Dept: FAMILY MEDICINE | Facility: CLINIC | Age: 65
End: 2020-04-02

## 2020-04-02 DIAGNOSIS — M51.379 DEGENERATION OF LUMBAR OR LUMBOSACRAL INTERVERTEBRAL DISC: ICD-10-CM

## 2020-04-03 ENCOUNTER — COMMUNICATION - HEALTHEAST (OUTPATIENT)
Dept: ONCOLOGY | Facility: HOSPITAL | Age: 65
End: 2020-04-03

## 2020-04-08 ENCOUNTER — COMMUNICATION - HEALTHEAST (OUTPATIENT)
Dept: FAMILY MEDICINE | Facility: CLINIC | Age: 65
End: 2020-04-08

## 2020-04-08 DIAGNOSIS — M51.379 DEGENERATION OF LUMBAR OR LUMBOSACRAL INTERVERTEBRAL DISC: ICD-10-CM

## 2020-04-12 ENCOUNTER — COMMUNICATION - HEALTHEAST (OUTPATIENT)
Dept: FAMILY MEDICINE | Facility: CLINIC | Age: 65
End: 2020-04-12

## 2020-04-12 DIAGNOSIS — Q79.1 HEMIDIAPHRAGMATIC EVENTRATION: ICD-10-CM

## 2020-04-12 DIAGNOSIS — F10.10 ALCOHOL ABUSE: ICD-10-CM

## 2020-05-18 ENCOUNTER — COMMUNICATION - HEALTHEAST (OUTPATIENT)
Dept: FAMILY MEDICINE | Facility: CLINIC | Age: 65
End: 2020-05-18

## 2020-05-18 DIAGNOSIS — M51.379 DEGENERATION OF LUMBAR OR LUMBOSACRAL INTERVERTEBRAL DISC: ICD-10-CM

## 2020-06-22 ENCOUNTER — COMMUNICATION - HEALTHEAST (OUTPATIENT)
Dept: FAMILY MEDICINE | Facility: CLINIC | Age: 65
End: 2020-06-22

## 2020-06-22 DIAGNOSIS — M51.379 DEGENERATION OF LUMBAR OR LUMBOSACRAL INTERVERTEBRAL DISC: ICD-10-CM

## 2020-07-06 ENCOUNTER — COMMUNICATION - HEALTHEAST (OUTPATIENT)
Dept: FAMILY MEDICINE | Facility: CLINIC | Age: 65
End: 2020-07-06

## 2020-07-06 DIAGNOSIS — Q79.1 HEMIDIAPHRAGMATIC EVENTRATION: ICD-10-CM

## 2020-07-06 DIAGNOSIS — I10 ESSENTIAL HYPERTENSION: ICD-10-CM

## 2020-07-06 DIAGNOSIS — F10.10 ALCOHOL ABUSE: ICD-10-CM

## 2020-07-06 DIAGNOSIS — I21.4 NSTEMI (NON-ST ELEVATED MYOCARDIAL INFARCTION) (H): ICD-10-CM

## 2020-07-24 ENCOUNTER — COMMUNICATION - HEALTHEAST (OUTPATIENT)
Dept: FAMILY MEDICINE | Facility: CLINIC | Age: 65
End: 2020-07-24

## 2020-07-24 DIAGNOSIS — M51.379 DEGENERATION OF LUMBAR OR LUMBOSACRAL INTERVERTEBRAL DISC: ICD-10-CM

## 2020-08-10 ENCOUNTER — COMMUNICATION - HEALTHEAST (OUTPATIENT)
Dept: ADMINISTRATIVE | Facility: HOSPITAL | Age: 65
End: 2020-08-10

## 2020-08-26 ENCOUNTER — COMMUNICATION - HEALTHEAST (OUTPATIENT)
Dept: FAMILY MEDICINE | Facility: CLINIC | Age: 65
End: 2020-08-26

## 2020-08-26 DIAGNOSIS — M51.379 DEGENERATION OF LUMBAR OR LUMBOSACRAL INTERVERTEBRAL DISC: ICD-10-CM

## 2020-09-03 ENCOUNTER — OFFICE VISIT - HEALTHEAST (OUTPATIENT)
Dept: FAMILY MEDICINE | Facility: CLINIC | Age: 65
End: 2020-09-03

## 2020-09-03 DIAGNOSIS — I10 ESSENTIAL HYPERTENSION: ICD-10-CM

## 2020-09-03 DIAGNOSIS — Z85.038 HISTORY OF COLON CANCER: ICD-10-CM

## 2020-09-03 DIAGNOSIS — H61.21 IMPACTED CERUMEN OF RIGHT EAR: ICD-10-CM

## 2020-09-03 DIAGNOSIS — Z11.4 SCREENING FOR HIV (HUMAN IMMUNODEFICIENCY VIRUS): ICD-10-CM

## 2020-09-03 DIAGNOSIS — G47.33 OBSTRUCTIVE SLEEP APNEA: ICD-10-CM

## 2020-09-03 DIAGNOSIS — I25.2 HISTORY OF NON-ST ELEVATION MYOCARDIAL INFARCTION (NSTEMI): ICD-10-CM

## 2020-09-03 DIAGNOSIS — I10 BENIGN ESSENTIAL HYPERTENSION: ICD-10-CM

## 2020-09-03 DIAGNOSIS — M51.379 DEGENERATION OF LUMBAR OR LUMBOSACRAL INTERVERTEBRAL DISC: ICD-10-CM

## 2020-09-03 DIAGNOSIS — Z11.59 ENCOUNTER FOR HEPATITIS C SCREENING TEST FOR LOW RISK PATIENT: ICD-10-CM

## 2020-09-03 LAB
ALBUMIN SERPL-MCNC: 3.9 G/DL (ref 3.5–5)
ALP SERPL-CCNC: 229 U/L (ref 45–120)
ALT SERPL W P-5'-P-CCNC: 64 U/L (ref 0–45)
ANION GAP SERPL CALCULATED.3IONS-SCNC: 17 MMOL/L (ref 5–18)
AST SERPL W P-5'-P-CCNC: 86 U/L (ref 0–40)
BILIRUB SERPL-MCNC: 0.6 MG/DL (ref 0–1)
BUN SERPL-MCNC: 10 MG/DL (ref 8–22)
CALCIUM SERPL-MCNC: 9 MG/DL (ref 8.5–10.5)
CHLORIDE BLD-SCNC: 105 MMOL/L (ref 98–107)
CHOLEST SERPL-MCNC: 168 MG/DL
CO2 SERPL-SCNC: 21 MMOL/L (ref 22–31)
CREAT SERPL-MCNC: 0.83 MG/DL (ref 0.7–1.3)
FASTING STATUS PATIENT QL REPORTED: YES
GFR SERPL CREATININE-BSD FRML MDRD: >60 ML/MIN/1.73M2
GLUCOSE BLD-MCNC: 96 MG/DL (ref 70–125)
HDLC SERPL-MCNC: 102 MG/DL
HIV 1+2 AB+HIV1 P24 AG SERPL QL IA: NEGATIVE
LDLC SERPL CALC-MCNC: 51 MG/DL
POTASSIUM BLD-SCNC: 3.9 MMOL/L (ref 3.5–5)
PROT SERPL-MCNC: 6.7 G/DL (ref 6–8)
SODIUM SERPL-SCNC: 143 MMOL/L (ref 136–145)
TRIGL SERPL-MCNC: 77 MG/DL

## 2020-09-03 ASSESSMENT — MIFFLIN-ST. JEOR: SCORE: 1577.01

## 2020-09-04 ENCOUNTER — COMMUNICATION - HEALTHEAST (OUTPATIENT)
Dept: FAMILY MEDICINE | Facility: CLINIC | Age: 65
End: 2020-09-04

## 2020-09-04 LAB — HCV AB SERPL QL IA: NEGATIVE

## 2020-09-15 ENCOUNTER — COMMUNICATION - HEALTHEAST (OUTPATIENT)
Dept: ADMINISTRATIVE | Facility: HOSPITAL | Age: 65
End: 2020-09-15

## 2020-09-22 ENCOUNTER — COMMUNICATION - HEALTHEAST (OUTPATIENT)
Dept: ADMINISTRATIVE | Facility: HOSPITAL | Age: 65
End: 2020-09-22

## 2020-09-25 ENCOUNTER — COMMUNICATION - HEALTHEAST (OUTPATIENT)
Dept: FAMILY MEDICINE | Facility: CLINIC | Age: 65
End: 2020-09-25

## 2020-09-25 ENCOUNTER — COMMUNICATION - HEALTHEAST (OUTPATIENT)
Dept: CARDIOLOGY | Facility: CLINIC | Age: 65
End: 2020-09-25

## 2020-09-25 DIAGNOSIS — I25.10 CAD (CORONARY ARTERY DISEASE): ICD-10-CM

## 2020-10-02 ENCOUNTER — COMMUNICATION - HEALTHEAST (OUTPATIENT)
Dept: ADMINISTRATIVE | Facility: HOSPITAL | Age: 65
End: 2020-10-02

## 2020-10-08 ENCOUNTER — COMMUNICATION - HEALTHEAST (OUTPATIENT)
Dept: FAMILY MEDICINE | Facility: CLINIC | Age: 65
End: 2020-10-08

## 2020-10-08 DIAGNOSIS — M51.379 DEGENERATION OF LUMBAR OR LUMBOSACRAL INTERVERTEBRAL DISC: ICD-10-CM

## 2020-10-16 ENCOUNTER — COMMUNICATION - HEALTHEAST (OUTPATIENT)
Dept: CARDIOLOGY | Facility: CLINIC | Age: 65
End: 2020-10-16

## 2020-10-16 DIAGNOSIS — E78.5 HYPERLIPIDEMIA: ICD-10-CM

## 2020-11-09 ENCOUNTER — COMMUNICATION - HEALTHEAST (OUTPATIENT)
Dept: FAMILY MEDICINE | Facility: CLINIC | Age: 65
End: 2020-11-09

## 2020-11-09 DIAGNOSIS — I21.4 NSTEMI (NON-ST ELEVATED MYOCARDIAL INFARCTION) (H): ICD-10-CM

## 2020-11-12 RX ORDER — ISOSORBIDE DINITRATE 10 MG/1
TABLET ORAL
Qty: 270 TABLET | Refills: 3 | Status: SHIPPED | OUTPATIENT
Start: 2020-11-12 | End: 2021-01-01

## 2020-11-18 ENCOUNTER — COMMUNICATION - HEALTHEAST (OUTPATIENT)
Dept: FAMILY MEDICINE | Facility: CLINIC | Age: 65
End: 2020-11-18

## 2020-11-18 ENCOUNTER — RECORDS - HEALTHEAST (OUTPATIENT)
Dept: FAMILY MEDICINE | Facility: CLINIC | Age: 65
End: 2020-11-18

## 2020-11-18 DIAGNOSIS — I10 ESSENTIAL HYPERTENSION: ICD-10-CM

## 2020-11-18 DIAGNOSIS — K21.00 GASTROESOPHAGEAL REFLUX DISEASE WITH ESOPHAGITIS WITHOUT HEMORRHAGE: ICD-10-CM

## 2020-11-18 DIAGNOSIS — E78.5 HYPERLIPIDEMIA: ICD-10-CM

## 2020-11-18 DIAGNOSIS — G47.33 OBSTRUCTIVE SLEEP APNEA: ICD-10-CM

## 2020-11-19 ENCOUNTER — COMMUNICATION - HEALTHEAST (OUTPATIENT)
Dept: FAMILY MEDICINE | Facility: CLINIC | Age: 65
End: 2020-11-19

## 2020-11-19 DIAGNOSIS — K21.00 GASTROESOPHAGEAL REFLUX DISEASE WITH ESOPHAGITIS WITHOUT HEMORRHAGE: ICD-10-CM

## 2020-11-20 ENCOUNTER — COMMUNICATION - HEALTHEAST (OUTPATIENT)
Dept: FAMILY MEDICINE | Facility: CLINIC | Age: 65
End: 2020-11-20

## 2020-11-20 DIAGNOSIS — I10 ESSENTIAL HYPERTENSION: ICD-10-CM

## 2020-11-20 RX ORDER — METOPROLOL SUCCINATE 100 MG/1
TABLET, EXTENDED RELEASE ORAL
Qty: 90 TABLET | Refills: 2 | Status: SHIPPED | OUTPATIENT
Start: 2020-11-20 | End: 2021-08-05

## 2020-12-02 ENCOUNTER — COMMUNICATION - HEALTHEAST (OUTPATIENT)
Dept: FAMILY MEDICINE | Facility: CLINIC | Age: 65
End: 2020-12-02

## 2020-12-02 DIAGNOSIS — K21.9 GERD (GASTROESOPHAGEAL REFLUX DISEASE): ICD-10-CM

## 2020-12-04 ENCOUNTER — OFFICE VISIT - HEALTHEAST (OUTPATIENT)
Dept: FAMILY MEDICINE | Facility: CLINIC | Age: 65
End: 2020-12-04

## 2020-12-04 ENCOUNTER — COMMUNICATION - HEALTHEAST (OUTPATIENT)
Dept: FAMILY MEDICINE | Facility: CLINIC | Age: 65
End: 2020-12-04

## 2020-12-04 DIAGNOSIS — K70.10 ACUTE ALCOHOLIC HEPATITIS (H): ICD-10-CM

## 2020-12-04 DIAGNOSIS — I25.2 H/O NON-ST ELEVATION MYOCARDIAL INFARCTION (NSTEMI): ICD-10-CM

## 2020-12-04 DIAGNOSIS — R74.8 ELEVATED LIVER ENZYMES: ICD-10-CM

## 2020-12-04 DIAGNOSIS — M51.379 DEGENERATION OF LUMBAR OR LUMBOSACRAL INTERVERTEBRAL DISC: ICD-10-CM

## 2020-12-04 DIAGNOSIS — I10 ESSENTIAL HYPERTENSION: ICD-10-CM

## 2020-12-04 DIAGNOSIS — Z86.718 PERSONAL HISTORY OF DVT (DEEP VEIN THROMBOSIS): ICD-10-CM

## 2020-12-04 DIAGNOSIS — I25.10 CORONARY ARTERY DISEASE INVOLVING NATIVE CORONARY ARTERY OF NATIVE HEART WITHOUT ANGINA PECTORIS: ICD-10-CM

## 2020-12-04 DIAGNOSIS — G47.33 OBSTRUCTIVE SLEEP APNEA: ICD-10-CM

## 2020-12-04 DIAGNOSIS — Z12.5 SCREENING FOR PROSTATE CANCER: ICD-10-CM

## 2020-12-04 DIAGNOSIS — Z00.00 ENCOUNTER FOR MEDICARE ANNUAL WELLNESS EXAM: ICD-10-CM

## 2020-12-04 DIAGNOSIS — E78.5 HYPERLIPIDEMIA, UNSPECIFIED HYPERLIPIDEMIA TYPE: ICD-10-CM

## 2020-12-04 DIAGNOSIS — K76.0 HEPATIC STEATOSIS: ICD-10-CM

## 2020-12-04 LAB
ALBUMIN SERPL-MCNC: 4.1 G/DL (ref 3.5–5)
ALP SERPL-CCNC: 251 U/L (ref 45–120)
ALT SERPL W P-5'-P-CCNC: 58 U/L (ref 0–45)
ANION GAP SERPL CALCULATED.3IONS-SCNC: 19 MMOL/L (ref 5–18)
AST SERPL W P-5'-P-CCNC: 90 U/L (ref 0–40)
BILIRUB SERPL-MCNC: 0.8 MG/DL (ref 0–1)
BUN SERPL-MCNC: 11 MG/DL (ref 8–22)
CALCIUM SERPL-MCNC: 10.1 MG/DL (ref 8.5–10.5)
CHLORIDE BLD-SCNC: 102 MMOL/L (ref 98–107)
CO2 SERPL-SCNC: 25 MMOL/L (ref 22–31)
CREAT SERPL-MCNC: 0.75 MG/DL (ref 0.7–1.3)
GFR SERPL CREATININE-BSD FRML MDRD: >60 ML/MIN/1.73M2
GLUCOSE BLD-MCNC: 98 MG/DL (ref 70–125)
POTASSIUM BLD-SCNC: 3.2 MMOL/L (ref 3.5–5)
PROT SERPL-MCNC: 7.2 G/DL (ref 6–8)
PSA SERPL-MCNC: 3 NG/ML (ref 0–4.5)
SODIUM SERPL-SCNC: 146 MMOL/L (ref 136–145)

## 2020-12-04 ASSESSMENT — MIFFLIN-ST. JEOR: SCORE: 1560.57

## 2020-12-09 ENCOUNTER — AMBULATORY - HEALTHEAST (OUTPATIENT)
Dept: ONCOLOGY | Facility: HOSPITAL | Age: 65
End: 2020-12-09

## 2020-12-09 ENCOUNTER — HOSPITAL ENCOUNTER (OUTPATIENT)
Dept: ULTRASOUND IMAGING | Facility: CLINIC | Age: 65
Discharge: HOME | End: 2020-12-09

## 2020-12-09 ENCOUNTER — AMBULATORY - HEALTHEAST (OUTPATIENT)
Dept: LAB | Facility: CLINIC | Age: 65
End: 2020-12-09

## 2020-12-09 ENCOUNTER — COMMUNICATION - HEALTHEAST (OUTPATIENT)
Dept: ONCOLOGY | Facility: HOSPITAL | Age: 65
End: 2020-12-09

## 2020-12-09 DIAGNOSIS — C18.2 PRIMARY ADENOCARCINOMA OF ASCENDING COLON (H): ICD-10-CM

## 2020-12-09 DIAGNOSIS — R74.8 ELEVATED LIVER ENZYMES: ICD-10-CM

## 2020-12-09 DIAGNOSIS — E87.6 HYPOKALEMIA: ICD-10-CM

## 2020-12-09 LAB
ALBUMIN SERPL-MCNC: 4 G/DL (ref 3.5–5)
ALP SERPL-CCNC: 212 U/L (ref 45–120)
ALT SERPL W P-5'-P-CCNC: 44 U/L (ref 0–45)
ANION GAP SERPL CALCULATED.3IONS-SCNC: 12 MMOL/L (ref 5–18)
AST SERPL W P-5'-P-CCNC: 72 U/L (ref 0–40)
BASOPHILS # BLD AUTO: 0.1 THOU/UL (ref 0–0.2)
BASOPHILS NFR BLD AUTO: 1 % (ref 0–2)
BILIRUB SERPL-MCNC: 1 MG/DL (ref 0–1)
BUN SERPL-MCNC: 8 MG/DL (ref 8–22)
CALCIUM SERPL-MCNC: 9.8 MG/DL (ref 8.5–10.5)
CEA SERPL-MCNC: 5.3 NG/ML (ref 0–3)
CHLORIDE BLD-SCNC: 99 MMOL/L (ref 98–107)
CO2 SERPL-SCNC: 30 MMOL/L (ref 22–31)
CREAT SERPL-MCNC: 0.77 MG/DL (ref 0.7–1.3)
EOSINOPHIL # BLD AUTO: 0.2 THOU/UL (ref 0–0.4)
EOSINOPHIL NFR BLD AUTO: 2 % (ref 0–6)
ERYTHROCYTE [DISTWIDTH] IN BLOOD BY AUTOMATED COUNT: 13.4 % (ref 11–14.5)
GFR SERPL CREATININE-BSD FRML MDRD: >60 ML/MIN/1.73M2
GLUCOSE BLD-MCNC: 119 MG/DL (ref 70–125)
HCT VFR BLD AUTO: 45.4 % (ref 40–54)
HGB BLD-MCNC: 15 G/DL (ref 14–18)
IMM GRANULOCYTES # BLD: 0 THOU/UL
IMM GRANULOCYTES NFR BLD: 1 %
LYMPHOCYTES # BLD AUTO: 1.5 THOU/UL (ref 0.8–4.4)
LYMPHOCYTES NFR BLD AUTO: 17 % (ref 20–40)
MCH RBC QN AUTO: 30.7 PG (ref 27–34)
MCHC RBC AUTO-ENTMCNC: 33 G/DL (ref 32–36)
MCV RBC AUTO: 93 FL (ref 80–100)
MONOCYTES # BLD AUTO: 0.9 THOU/UL (ref 0–0.9)
MONOCYTES NFR BLD AUTO: 10 % (ref 2–10)
NEUTROPHILS # BLD AUTO: 6.1 THOU/UL (ref 2–7.7)
NEUTROPHILS NFR BLD AUTO: 69 % (ref 50–70)
PLATELET # BLD AUTO: 174 THOU/UL (ref 140–440)
PMV BLD AUTO: 9.6 FL (ref 8.5–12.5)
POTASSIUM BLD-SCNC: 2.8 MMOL/L (ref 3.5–5)
PROT SERPL-MCNC: 7.5 G/DL (ref 6–8)
RBC # BLD AUTO: 4.88 MILL/UL (ref 4.4–6.2)
SODIUM SERPL-SCNC: 141 MMOL/L (ref 136–145)
WBC: 8.8 THOU/UL (ref 4–11)

## 2020-12-10 ENCOUNTER — COMMUNICATION - HEALTHEAST (OUTPATIENT)
Dept: FAMILY MEDICINE | Facility: CLINIC | Age: 65
End: 2020-12-10

## 2021-01-01 DIAGNOSIS — I21.4 NSTEMI (NON-ST ELEVATED MYOCARDIAL INFARCTION) (H): ICD-10-CM

## 2021-01-01 DIAGNOSIS — I10 ESSENTIAL HYPERTENSION: ICD-10-CM

## 2021-01-01 RX ORDER — ISOSORBIDE DINITRATE 10 MG/1
TABLET ORAL
Qty: 270 TABLET | Refills: 1 | Status: SHIPPED | OUTPATIENT
Start: 2021-01-01 | End: 2022-01-01

## 2021-01-01 RX ORDER — METOPROLOL SUCCINATE 100 MG/1
TABLET, EXTENDED RELEASE ORAL
Qty: 90 TABLET | Refills: 1 | Status: SHIPPED | OUTPATIENT
Start: 2021-01-01 | End: 2022-01-01

## 2021-01-23 ENCOUNTER — COMMUNICATION - HEALTHEAST (OUTPATIENT)
Dept: FAMILY MEDICINE | Facility: CLINIC | Age: 66
End: 2021-01-23

## 2021-01-23 DIAGNOSIS — E78.5 HYPERLIPIDEMIA: ICD-10-CM

## 2021-01-24 RX ORDER — ATORVASTATIN CALCIUM 40 MG/1
40 TABLET, FILM COATED ORAL AT BEDTIME
Qty: 90 TABLET | Refills: 3 | Status: SHIPPED | OUTPATIENT
Start: 2021-01-24 | End: 2022-01-01

## 2021-02-05 ENCOUNTER — COMMUNICATION - HEALTHEAST (OUTPATIENT)
Dept: FAMILY MEDICINE | Facility: CLINIC | Age: 66
End: 2021-02-05

## 2021-02-05 DIAGNOSIS — I10 ESSENTIAL HYPERTENSION: ICD-10-CM

## 2021-02-05 RX ORDER — LISINOPRIL 40 MG/1
TABLET ORAL
Qty: 90 TABLET | Refills: 3 | Status: SHIPPED | OUTPATIENT
Start: 2021-02-05 | End: 2022-01-01

## 2021-02-24 ENCOUNTER — COMMUNICATION - HEALTHEAST (OUTPATIENT)
Dept: FAMILY MEDICINE | Facility: CLINIC | Age: 66
End: 2021-02-24

## 2021-02-24 DIAGNOSIS — K21.9 GERD (GASTROESOPHAGEAL REFLUX DISEASE): ICD-10-CM

## 2021-02-25 RX ORDER — OMEPRAZOLE 40 MG/1
40 CAPSULE, DELAYED RELEASE ORAL DAILY
Qty: 90 CAPSULE | Refills: 2 | Status: SHIPPED | OUTPATIENT
Start: 2021-02-25 | End: 2021-09-09

## 2021-03-08 ENCOUNTER — OFFICE VISIT - HEALTHEAST (OUTPATIENT)
Dept: ONCOLOGY | Facility: CLINIC | Age: 66
End: 2021-03-08

## 2021-03-08 DIAGNOSIS — C18.2 PRIMARY ADENOCARCINOMA OF ASCENDING COLON (H): ICD-10-CM

## 2021-03-08 LAB
ALBUMIN SERPL-MCNC: 3.9 G/DL (ref 3.5–5)
ALP SERPL-CCNC: 188 U/L (ref 45–120)
ALT SERPL W P-5'-P-CCNC: 54 U/L (ref 0–45)
ANION GAP SERPL CALCULATED.3IONS-SCNC: 16 MMOL/L (ref 5–18)
AST SERPL W P-5'-P-CCNC: 93 U/L (ref 0–40)
BASOPHILS # BLD AUTO: 0.1 THOU/UL (ref 0–0.2)
BASOPHILS NFR BLD AUTO: 1 % (ref 0–2)
BILIRUB SERPL-MCNC: 0.8 MG/DL (ref 0–1)
BUN SERPL-MCNC: 8 MG/DL (ref 8–22)
CALCIUM SERPL-MCNC: 9.1 MG/DL (ref 8.5–10.5)
CEA SERPL-MCNC: 5.9 NG/ML (ref 0–3)
CHLORIDE BLD-SCNC: 103 MMOL/L (ref 98–107)
CO2 SERPL-SCNC: 23 MMOL/L (ref 22–31)
CREAT SERPL-MCNC: 0.75 MG/DL (ref 0.7–1.3)
EOSINOPHIL # BLD AUTO: 0.1 THOU/UL (ref 0–0.4)
EOSINOPHIL NFR BLD AUTO: 2 % (ref 0–6)
ERYTHROCYTE [DISTWIDTH] IN BLOOD BY AUTOMATED COUNT: 14.5 % (ref 11–14.5)
GFR SERPL CREATININE-BSD FRML MDRD: >60 ML/MIN/1.73M2
GLUCOSE BLD-MCNC: 110 MG/DL (ref 70–125)
HCT VFR BLD AUTO: 43.4 % (ref 40–54)
HGB BLD-MCNC: 14.5 G/DL (ref 14–18)
IMM GRANULOCYTES # BLD: 0 THOU/UL
IMM GRANULOCYTES NFR BLD: 1 %
LYMPHOCYTES # BLD AUTO: 1.5 THOU/UL (ref 0.8–4.4)
LYMPHOCYTES NFR BLD AUTO: 23 % (ref 20–40)
MCH RBC QN AUTO: 30.8 PG (ref 27–34)
MCHC RBC AUTO-ENTMCNC: 33.4 G/DL (ref 32–36)
MCV RBC AUTO: 92 FL (ref 80–100)
MONOCYTES # BLD AUTO: 0.7 THOU/UL (ref 0–0.9)
MONOCYTES NFR BLD AUTO: 11 % (ref 2–10)
NEUTROPHILS # BLD AUTO: 4.1 THOU/UL (ref 2–7.7)
NEUTROPHILS NFR BLD AUTO: 63 % (ref 50–70)
PLATELET # BLD AUTO: 173 THOU/UL (ref 140–440)
PMV BLD AUTO: 9.6 FL (ref 8.5–12.5)
POTASSIUM BLD-SCNC: 3.6 MMOL/L (ref 3.5–5)
PROT SERPL-MCNC: 7.3 G/DL (ref 6–8)
RBC # BLD AUTO: 4.71 MILL/UL (ref 4.4–6.2)
SODIUM SERPL-SCNC: 142 MMOL/L (ref 136–145)
WBC: 6.5 THOU/UL (ref 4–11)

## 2021-03-17 ENCOUNTER — HOSPITAL ENCOUNTER (OUTPATIENT)
Dept: CT IMAGING | Facility: CLINIC | Age: 66
Setting detail: RADIATION/ONCOLOGY SERIES
Discharge: STILL ADMITTED OR ADMINISTRATIVE | End: 2021-03-17
Attending: INTERNAL MEDICINE

## 2021-03-17 DIAGNOSIS — C18.2 PRIMARY ADENOCARCINOMA OF ASCENDING COLON (H): ICD-10-CM

## 2021-04-08 ENCOUNTER — AMBULATORY - HEALTHEAST (OUTPATIENT)
Dept: ONCOLOGY | Facility: HOSPITAL | Age: 66
End: 2021-04-08

## 2021-04-08 DIAGNOSIS — C18.2 PRIMARY ADENOCARCINOMA OF ASCENDING COLON (H): ICD-10-CM

## 2021-04-12 ENCOUNTER — TRANSCRIBE ORDERS (OUTPATIENT)
Dept: ONCOLOGY | Facility: HOSPITAL | Age: 66
End: 2021-04-12

## 2021-04-12 DIAGNOSIS — C18.2 PRIMARY ADENOCARCINOMA OF ASCENDING COLON (H): Primary | ICD-10-CM

## 2021-04-14 ENCOUNTER — TELEPHONE (OUTPATIENT)
Dept: GASTROENTEROLOGY | Facility: CLINIC | Age: 66
End: 2021-04-14

## 2021-04-14 NOTE — LETTER
April 22, 2021    Christian Edwards                              [address] [place] [id number]    Dear Christian,      Our office recently received a referral from Dr. Sy related to your healthcare. We have reviewed your records and imaging and have suggestions on how we would proceed. We have made several attempts to contact you but have been unable to reach you. Your referral will be closed at this time and we will not make further attempts to contact you.     Your healthcare is important to us. If you wish to follow up on this referral please contact our office at [phone].     Val Herron RN Care Coordinator

## 2021-04-14 NOTE — TELEPHONE ENCOUNTER
Per Dr. Leong  Please schedule colonoscopy under MAC in U procedure unit after full prep     Left message.    ML

## 2021-04-14 NOTE — TELEPHONE ENCOUNTER
Advanced Endoscopy     Referring provider:  Bayron Sy in [facility]    Referred to: [facility]     Provider Requested:  NA     Referral Received: 4/14/21     Records received: in CareEverywhere     Images received: in PACS    Evaluation for: Primary adenocarcinoma of ascending colon      Clinical History (per RN review):     . Colon cancer: Diagnosed in August 2019: Right-sided, cecum. Mucinous adenocarcinoma. Grade 2. 11 cm. 32 regional nodes negative. dMMR/MSI-high.      Seen in clinic for follow up. CT showing concern for mass.    CT 3/17/21  IMPRESSION:   1.  Right hemicolectomy with ileocolonic anastomosis. There is some subtle eccentric soft tissue thickening anteriorly in the colon at the ileocolonic anastomosis, and would recommend direct visualization with colonoscopy to exclude an en plaque mass.    2.  Stable fibrotic changes in the lungs.    3.  Fatty liver. No focal liver lesions.    4.  There is subtle left-sided ureteral pyelocaliectasis without ureteric or bladder stone. It is possible patient has passed a stone from the left ureter recently. There is a tiny nonobstructing stone of the right kidney.    MD review date:   MD Decision for clinic consultation/Orders:            Referral updates/Patient contacted:

## 2021-05-27 NOTE — TELEPHONE ENCOUNTER
Refill Request  Did you contact pharmacy: No  Medication name: codeine 30 MG tablet  Requested Prescriptions      No prescriptions requested or ordered in this encounter     Who prescribed the medication: Dr Manriquez  Pharmacy Name and Location: [facility] [place]  Is patient out of medication: patient says he has enough to get him through until medication is refilled  Patient notified refills processed in 72 hours:  yes  Okay to leave a detailed message: yes

## 2021-05-27 NOTE — TELEPHONE ENCOUNTER
Controlled Substance Refill Request  Medication:   Requested Prescriptions     Pending Prescriptions Disp Refills     codeine 30 MG tablet 60 tablet 0     Sig: Take 1 tablet (30 mg total) by mouth every 12 (twelve) hours as needed for pain.     Date Last Fill: 3/4/19  [facility]   Submit electronically to pharmacy    Controlled Substance Agreement on File:   Encounter-Level CSA Scan Date:    There are no encounter-level csa scan date.       Last office visit with primary: 6/19/2018

## 2021-05-28 ENCOUNTER — RECORDS - HEALTHEAST (OUTPATIENT)
Dept: ADMINISTRATIVE | Facility: CLINIC | Age: 66
End: 2021-05-28

## 2021-05-28 NOTE — TELEPHONE ENCOUNTER
Patient is out of medication.        Controlled Substance Refill Request  Medication Name:   Requested Prescriptions     Pending Prescriptions Disp Refills     codeine 30 MG tablet 60 tablet 0     Sig: Take 1 tablet (30 mg total) by mouth every 12 (twelve) hours as needed for pain.     Date Last Fill: 4/15/19  [id number]    Submit electronically to pharmacy  Controlled Substance Agreement Date Scanned:   Encounter-Level CSA Scan Date:    There are no encounter-level csa scan date.       Last office visit with prescriber/PCP: 6/19/2018 Jeremías Manriquez MD OR same dept: 6/19/2018 Jeremías Manriquez MD OR same specialty: 6/19/2018 Jeremías Manriquez MD  Last physical: 6/2/2015 Last MTM visit: Visit date not found

## 2021-05-28 NOTE — TELEPHONE ENCOUNTER
Refill Approved    Rx renewed per Medication Renewal Policy. Medication was last renewed on 12/16/18. 4/29/18    Cindy Tadeo, [facility] Triage/Med Refill 4/29/2019     Requested Prescriptions   Pending Prescriptions Disp Refills     atorvastatin (LIPITOR) 20 MG tablet 90 tablet 1     Sig: Take 1 tablet (20 mg total) by mouth at bedtime.       Statins Refill Protocol (Hmg CoA Reductase Inhibitors) Passed - 4/29/2019 12:30 PM        Passed - PCP or prescribing provider visit in past 12 months      Last office visit with prescriber/PCP: 6/19/2018 Jeremías Manriquez MD OR same dept: 6/19/2018 Jeremías Manriquez MD OR same specialty: 6/19/2018 Jeremías Manriquez MD  Last physical: 6/2/2015 Last MTM visit: Visit date not found   Next visit within 3 mo: Visit date not found  Next physical within 3 mo: Visit date not found  Prescriber OR PCP: Jeremías Manriquez MD  Last diagnosis associated with med order: 1. Hyperlipidemia  - atorvastatin (LIPITOR) 20 MG tablet; Take 1 tablet (20 mg total) by mouth at bedtime.  Dispense: 90 tablet; Refill: 1    2. GERD (gastroesophageal reflux disease)  - omeprazole (PRILOSEC) 40 MG capsule; Take 1 capsule (40 mg total) by mouth daily.  Dispense: 90 capsule; Refill: 3    If protocol passes may refill for 12 months if within 3 months of last provider visit (or a total of 15 months).             omeprazole (PRILOSEC) 40 MG capsule 90 capsule 3     Sig: Take 1 capsule (40 mg total) by mouth daily.       GI Medications Refill Protocol Passed - 4/29/2019 12:30 PM        Passed - PCP or prescribing provider visit in last 12 or next 3 months.     Last office visit with prescriber/PCP: 6/19/2018 Jeremías Manriquez MD OR same dept: 6/19/2018 Jeremías Manriquez MD OR same specialty: 6/19/2018 Jeremías Manriquez MD  Last physical: 6/2/2015 Last MTM visit: Visit date not found   Next visit within 3 mo: Visit date not found   Next physical within 3 mo: Visit date not found  Prescriber OR PCP: Jeremías Manriquez MD  Last diagnosis associated with med order: 1. Hyperlipidemia  - atorvastatin (LIPITOR) 20 MG tablet; Take 1 tablet (20 mg total) by mouth at bedtime.  Dispense: 90 tablet; Refill: 1    2. GERD (gastroesophageal reflux disease)  - omeprazole (PRILOSEC) 40 MG capsule; Take 1 capsule (40 mg total) by mouth daily.  Dispense: 90 capsule; Refill: 3    If protocol passes may refill for 12 months if within 3 months of last provider visit (or a total of 15 months).

## 2021-05-29 NOTE — TELEPHONE ENCOUNTER
RN cannot approve Refill Request    RN can NOT refill this medication overdue for office visits and/or labs.    Christian Mauricio, [facility] Triage/Med Refill 5/22/2019    Requested Prescriptions   Pending Prescriptions Disp Refills     clopidogrel (PLAVIX) 75 mg tablet 90 tablet 2     Sig: Take 1 tablet (75 mg total) by mouth daily.       Clopidogrel/Prasugrel/Ticagrelor Refill Protocol Failed - 5/22/2019  1:47 PM        Failed - PCP or prescribing provider visit in past 6 months       Last office visit with prescriber/PCP: Visit date not found OR same dept: 6/19/2018 Jeremías Manriquez MD OR same specialty: 6/19/2018 Jeremías Manriquez MD Last physical: Visit date not found Last MTM visit: Visit date not found     Next appt within 3 mo: Visit date not found  Next physical within 3 mo: Visit date not found  Prescriber OR PCP: Jeremías Manriquez MD  Last diagnosis associated with med order: There are no diagnoses linked to this encounter.  If protocol passes may refill for 6 months if within 3 months of last provider visit (or a total of 9 months).              Failed - Hemoglobin in past 12 months     Hemoglobin   Date Value Ref Range Status   12/20/2016 16.5 14.0 - 18.0 g/dL Final     Hgb   Date Value Ref Range Status   05/17/2017 15.7 7.0 g/dL Final

## 2021-05-29 NOTE — TELEPHONE ENCOUNTER
Refill Request  Did you contact pharmacy: Yes  Medication name:   Requested Prescriptions     Pending Prescriptions Disp Refills     clopidogrel (PLAVIX) 75 mg tablet 90 tablet 2     Sig: Take 1 tablet (75 mg total) by mouth daily.     Who prescribed the medication: Dr Manriquez      Pharmacy Name and Location: Changing pharmacy to [facility].    Okay to leave a detailed message: yes

## 2021-05-29 NOTE — TELEPHONE ENCOUNTER
Controlled Substance Refill Request  Medication Name:   Requested Prescriptions     Pending Prescriptions Disp Refills     codeine 30 MG tablet 60 tablet 0     Sig: Take 1 tablet (30 mg total) by mouth every 12 (twelve) hours as needed for pain. Last refill..     Date Last Fill: 5/14/19  [facility]  Submit electronically to pharmacy  Controlled Substance Agreement Date Scanned:   Encounter-Level CSA Scan Date:    There are no encounter-level csa scan date.       Last office visit with prescriber/PCP: 6/19/2018 Jeremías Manriquez MD OR same dept: 6/19/2018 Jeremías Manriquez MD OR same specialty: 6/19/2018 Jeremías Manriquez MD  Last physical: 6/2/2015 Last MTM visit: Visit date not found      Patient is asking for his refill to go to [facility] since there is no co-pay. Patient stated he thought [facility] sent it last week. Please send refill as soon as possible.

## 2021-05-30 ENCOUNTER — RECORDS - HEALTHEAST (OUTPATIENT)
Dept: ADMINISTRATIVE | Facility: CLINIC | Age: 66
End: 2021-05-30

## 2021-05-30 VITALS — BODY MASS INDEX: 31.47 KG/M2 | WEIGHT: 195 LBS

## 2021-05-30 VITALS — BODY MASS INDEX: 32.14 KG/M2 | WEIGHT: 200 LBS | HEIGHT: 66 IN

## 2021-05-30 VITALS — HEIGHT: 66 IN | BODY MASS INDEX: 31.5 KG/M2 | WEIGHT: 196 LBS

## 2021-05-30 NOTE — TELEPHONE ENCOUNTER
RN cannot approve Refill Request    RN can NOT refill this medication PCP messaged that patient is overdue for Office Visit. Last office visit: 6/19/2018 Jeremías Manriquez MD Last Physical: Visit date not found Last MTM visit: Visit date not found Last visit same specialty: 6/19/2018 Jeremías Manriquez MD.  Next visit within 3 mo: Visit date not found  Next physical within 3 mo: Visit date not found      Mandi Ordoñez, [facility] Triage/Med Refill 7/27/2019    Requested Prescriptions   Pending Prescriptions Disp Refills     atorvastatin (LIPITOR) 20 MG tablet [Pharmacy Med Name: ATORVASTATIN  20MG  TAB] 90 tablet 0     Sig: TAKE 1 TABLET BY MOUTH AT  BEDTIME       Statins Refill Protocol (Hmg CoA Reductase Inhibitors) Failed - 7/26/2019  2:31 PM        Failed - PCP or prescribing provider visit in past 12 months      Last office visit with prescriber/PCP: 6/19/2018 Jeremías Manriquez MD OR same dept: Visit date not found OR same specialty: 6/19/2018 Jeremías Manriquez MD  Last physical: Visit date not found Last MTM visit: Visit date not found   Next visit within 3 mo: Visit date not found  Next physical within 3 mo: Visit date not found  Prescriber OR PCP: Jeremías Manriquez MD  Last diagnosis associated with med order: 1. Hyperlipidemia  - atorvastatin (LIPITOR) 20 MG tablet [Pharmacy Med Name: ATORVASTATIN  20MG  TAB]; TAKE 1 TABLET BY MOUTH AT  BEDTIME  Dispense: 90 tablet; Refill: 0    2. GERD (gastroesophageal reflux disease)  - omeprazole (PRILOSEC) 40 MG capsule [Pharmacy Med Name: OMEPRAZOLE  40MG  CAP]; TAKE 1 CAPSULE BY MOUTH  DAILY  Dispense: 90 capsule; Refill: 0    If protocol passes may refill for 12 months if within 3 months of last provider visit (or a total of 15 months).             omeprazole (PRILOSEC) 40 MG capsule [Pharmacy Med Name: OMEPRAZOLE  40MG  CAP] 90 capsule 0     Sig: TAKE 1 CAPSULE BY MOUTH  DAILY       GI Medications Refill  Protocol Failed - 7/26/2019  2:31 PM        Failed - PCP or prescribing provider visit in last 12 or next 3 months.     Last office visit with prescriber/PCP: 6/19/2018 Jeremías Manriquez MD OR same dept: Visit date not found OR same specialty: 6/19/2018 Jeremías Manriquez MD  Last physical: Visit date not found Last MTM visit: Visit date not found   Next visit within 3 mo: Visit date not found  Next physical within 3 mo: Visit date not found  Prescriber OR PCP: Jeremías Manriquez MD  Last diagnosis associated with med order: 1. Hyperlipidemia  - atorvastatin (LIPITOR) 20 MG tablet [Pharmacy Med Name: ATORVASTATIN  20MG  TAB]; TAKE 1 TABLET BY MOUTH AT  BEDTIME  Dispense: 90 tablet; Refill: 0    2. GERD (gastroesophageal reflux disease)  - omeprazole (PRILOSEC) 40 MG capsule [Pharmacy Med Name: OMEPRAZOLE  40MG  CAP]; TAKE 1 CAPSULE BY MOUTH  DAILY  Dispense: 90 capsule; Refill: 0    If protocol passes may refill for 12 months if within 3 months of last provider visit (or a total of 15 months).

## 2021-05-31 ENCOUNTER — RECORDS - HEALTHEAST (OUTPATIENT)
Dept: ADMINISTRATIVE | Facility: CLINIC | Age: 66
End: 2021-05-31

## 2021-05-31 VITALS — HEIGHT: 66 IN | WEIGHT: 192 LBS | BODY MASS INDEX: 30.86 KG/M2

## 2021-05-31 NOTE — PROGRESS NOTES
"TCM DISCHARGE FOLLOW UP CALL    Discharge Date:  8/25/2019  Reason for hospital stay (discharge diagnosis)::  Chest Pain, NSTEMI, Colon cancer (new dx)  Are you feeling better, the same or worse since your discharge?:  Patient is feeling better (\"I feel wonderful.\"  Rates pain ~ 4/10 now, down to 2/10 when resting/sitting; yesterday walking around doing errands, had a little more pain.  Incision healing.  Passing loose BM's.)  Do you feel like you have a plan in the event of a health emergency?: Yes (WW)    As part of your discharge plan, were  home care services ordered for you?: No    Did you receive any new medications, or was there a change to your medications?: Yes    Are you taking those medications, or do you have any established regiment?:  RN reviewed discharge medications w/pt.  Pt states he is taking oxycodone 1 tab twice a day, at 9 AM & 9 PM, spacing them out twice a day as he was only given 12 tabs on d/c; he'll run out today.  He's taking 81 mg of ASA once a day, atorvastatin 80 mg at HS, metoprolol 100 mg in AM, omeprazole 40 mg in AM, and giving himself 2 Lovenox injections twice a day (total of 70 mg twice a day).  States he's noticing more bruising now w/the Lovenox, and the shots are getting more painful (confirmed he is rotating injection sites on his abd).  He asked about starting warfarin, stating he thought he was supposed to be on warfarin after taking Lovenox for 5 days.  He will discuss further anticoagulation plan at his f/u appt w/Dr. Manriquez tomorrow.  He completed both ATBx, azithromycin & cephalexin, as directed.  He stopped HCTZ,  mg, and clopidogrel as instructed.  He has not taken Tylenol, stating he was \"told years ago it was bad for my liver, so I haven't taken it.\"  States he does not recall having gotten a rx for isosorbide on d/c, he double checked his pill bottles during call, and does not have that med at home.  RN provided pt the phone number to [facility], " pt will call today to check on isosorbide rx, if it's not been filled he will have rx transferred to local pharmacy; if it has been filled, and because pt doesn't have it at home, he will need to get a new rx from Dr. Manriquez at appt tomorrow.  Reminded pt to bring his d/c med list & all medication bottles to his appt tomorrow.  RN recommended calling his surgeon's office today, to request a refill of oxycodone as he will run out later today and doesn't have f/u appt w/surgeon until 9/3/19.  Advised he can also take 500-1,000 mg of Tylenol q 4 hours (per AVS), not to exceed more than 3,000 mg in a 24 hour period.  Pt verbalized understanding & agrees w/plan.   Do you have any follow up visits scheduled with your PCP or Specialist?:  Yes, with PCP and Yes, with Specialist  (RN) Is PCP appt scheduled soon enough (within 14 days of discharge date)?: Yes    Who are you seeing and when is it scheduled?:  Surgeon 9/3/19; Oncologist 9/16/19; Pulmonology 10/2/19      Soo Guajardo RN Care Manager, Population Health

## 2021-05-31 NOTE — PATIENT INSTRUCTIONS - HE
Keep your scheduled appointments with the surgeon and oncology.    Oncology will determine if you can switch over the warfarin or have to keep on with the lovenox injections.    We will help connect you with a cardiologist in our system to take over your heart care management.    Recheck of blood today to make sure you're not becoming more anemic.    A refill of the percocet was sent to the pharmacy.  This isn't going to be for long term, and once you've met with oncology, we'll connect you with the spine clinic if you still need refills. Don't take this with the codeine. No alcohol or driving after using this.    Thank you for coming in today!    If you receive a survey from [employer] about your experience today, it would be very helpful if you could fill it out to let us know what went well and what we can improve!    General Information:    Today you had your appointment with Antonio Cota NP    My hours are:    Monday : Out of clinic  Tuesday : 8:00AM - 5:00 PM  Wednesday: 8:00AM - 5:00 PM  Thursday: 8:00AM - 5:00 PM  Friday: 8:00AM - 5:00 PM    I am not in the office Mondays. Therefore non-urgent calls and medical messages received on Monday will be addressed when I am back in the office on Tuesday. Urgent matters will be reviewed and addressed by one of my partners in the office as needed.    If lab work was done today as part of your evaluation you will generally be contacted via [employer] Inchart, mail, or phone with the results within 1-5 days. If there is an alarming result we will contact you by phone. Lab results come back at varying times, I generally wait until all lab results are available before making comments on the results.     If you need refills please contact your pharmacy. They will send a refill request to me to review. Please allow 3-5 business days for us to process all refill requests.     My Clinical Assistant is Meliza. Please call us at [phone] or send a medical message with any  questions or concerns.

## 2021-05-31 NOTE — PROGRESS NOTES
ASSESSMENT:  1. Bilateral leg pain  He does continue to have bilateral lower extremity pain.  Unfortunately previous evaluations done was negative.  And this is the reason why he continues to take his codeine pain medication also including the fact of low back pain.  Discussed complications and side effects of narcotic pain medications and refills were put in for him.    2. Diaphragmatic Paralysis  He does continue to have other problems with respiration, and that is giving him shortness of breath.  We will put in a referral for him to see the pulmonologist to see if there is any added management strategies.  3. Positive colorectal cancer screening using DNA-based stool test  Discussed with him the fact that he had a positive Cologuard.  He was informed before about it, but he never really need to do a colonoscopy.  I talked to him again about it and he noted that he is ready to do the colonoscopy.  An order was put in and he will be called for that.    4. Acute ST elevation myocardial infarction (STEMI), unspecified artery (H)  - Lipid Cascade  - Comprehensive Metabolic Panel  - HM2(CBC w/o Differential)  He has not been back to see the cardiologist, I did put in labs for him because he came in fasting.  5. Lower back pain  - codeine 30 MG tablet; Take 1 tablet (30 mg total) by mouth every 8 (eight) hours as needed for pain. Last refill..  Dispense: 90 tablet; Refill: 0    6. Controlled substance agreement signed 08/07/2019    7. Screen for colon cance  - Ambulatory referral for Colonoscopy      PLAN:  There are no Patient Instructions on file for this visit.    Orders Placed This Encounter   Procedures     Lipid Cascade     Order Specific Question:   Fasting is required?     Answer:   Yes     Comprehensive Metabolic Panel     HM2(CBC w/o Differential)     Ambulatory referral for Colonoscopy     Referral Priority:   Routine     Referral Type:   Colonoscopy     Referral Reason:   Evaluation and Treatment      Referral Location:   [facility]     Requested Specialty:   Gastroenterology     Number of Visits Requested:   1     Medications Discontinued During This Encounter   Medication Reason     codeine 30 MG tablet Reorder     atorvastatin (LIPITOR) 20 MG tablet Therapy completed       No follow-ups on file.      CHIEF COMPLAINT:  Chief Complaint   Patient presents with     Medication Management       HISTORY OF PRESENT ILLNESS:  Christian is a 63 y.o. male presenting to the clinic today mainly for medication management.  He has a history of bilateral lower extremity pain for which he has been taking codeine.  He has used this for a long time now.  He also has low back pain with lumbar radiculopathy for which he has had an evaluation with MRI showed multilevel degenerative changes and shallow disc protrusion L3-L4 with mild stenosis of the foramen on the right side.  He also has a history of cardiovascular disease and is on medications.  Noted no recent chest pain.  He does have diaphragmatic paralysis.  He had seen the pulmonologist in the past for this.  He noted that this is beginning to be a huge problem for him because he gets shortness of breath with moderate exertion.  That is a really restricting his movement and physical activity.  He noted that it is worse now.  Unfortunately he does continue to use alcohol though he will always say that it is much reduced compared to previously.  He did have a positive Cologuard which he had been informed in the past.  I did inform him again concerning the need to have a follow-up colonoscopy.  He noted that he will be able to do that this time and I did put an order for it.    REVIEW OF SYSTEMS:   He does have shortness of breath, denies having any chest pain.  He has had no lightheadedness.  Denied having any abdominal pain discomfort.  Does not have any numbness to the lower extremities.  Though his main concern today is that of refill of pain medication.   Denies any urinary symptoms, does not have any gastrointestinal problems.  All other systems are negative.    PFSH:  Reviewed, as below.    Social History     Tobacco Use   Smoking Status Former Smoker     Last attempt to quit: 2002     Years since quittin.6   Smokeless Tobacco Never Used       Family History   Problem Relation Age of Onset     Sleep apnea Brother      COPD Mother      Heart disease Father      No Medical Problems Sister        Social History     Socioeconomic History     Marital status:      Spouse name: Not on file     Number of children: Not on file     Years of education: Not on file     Highest education level: Not on file   Occupational History     Not on file   Social Needs     Financial resource strain: Not on file     Food insecurity:     Worry: Not on file     Inability: Not on file     Transportation needs:     Medical: Not on file     Non-medical: Not on file   Tobacco Use     Smoking status: Former Smoker     Last attempt to quit: 2002     Years since quittin.6     Smokeless tobacco: Never Used   Substance and Sexual Activity     Alcohol use: Yes     Alcohol/week: 3.0 oz     Types: 5 Shots of liquor per week     Comment: drinks daily.     Drug use: No     Sexual activity: Not on file   Lifestyle     Physical activity:     Days per week: Not on file     Minutes per session: Not on file     Stress: Not on file   Relationships     Social connections:     Talks on phone: Not on file     Gets together: Not on file     Attends Restoration service: Not on file     Active member of club or organization: Not on file     Attends meetings of clubs or organizations: Not on file     Relationship status: Not on file     Intimate partner violence:     Fear of current or ex partner: Not on file     Emotionally abused: Not on file     Physically abused: Not on file     Forced sexual activity: Not on file   Other Topics Concern     Not on file   Social History Narrative     Not on  file       Past Surgical History:   Procedure Laterality Date     stemi         No Known Allergies    Active Ambulatory Problems     Diagnosis Date Noted     Diaphragmatic Paralysis      Alcoholic Hepatitis      Glucose Intolerance      Alcohol Abuse      Acute Myocardial Infarction      Insomnia      Feeling Full Before Finishing Meals (Early Satiety)      Hyperlipidemia      Coronary Artery Disease      Lumbar Disc Degeneration      Lower Back Pain      Abnormal Glucose      Cough      Benign Tubulovillous Adenoma Of The Large Intestine      Lactase Deficiency Syndrome      Angioedema      Routine general medical examination at a health care facility 06/03/2015     Obstructive sleep apnea 11/09/2015     Hypoventilation 10/10/2016     H/O non-ST elevation myocardial infarction (NSTEMI) 06/19/2018     Hx of heart artery stent 06/19/2018     Resolved Ambulatory Problems     Diagnosis Date Noted     No Resolved Ambulatory Problems     Past Medical History:   Diagnosis Date     Acute Myocardial Infarction      Alcoholic Hepatitis      Angioedema      Benign Tubulovillous Adenoma Of The Large Intestine      Coronary Artery Disease      Coronary Artery Disease      Cough      Diaphragmatic Paralysis      Diaphragmatic Paralysis      Feeling Full Before Finishing Meals (Early Satiety)      Hyperlipidemia      Lactase Deficiency Syndrome      Lower Back Pain      Lower Back Pain      Lower Back Pain        Current Outpatient Medications   Medication Sig Dispense Refill     aspirin 325 MG tablet Take 325 mg by mouth daily.       atorvastatin (LIPITOR) 40 MG tablet Take 1 tablet (40 mg total) by mouth at bedtime. 90 tablet 2     clopidogrel (PLAVIX) 75 mg tablet Take 1 tablet (75 mg total) by mouth daily. No more refills till seen. 90 tablet 0     codeine 30 MG tablet Take 1 tablet (30 mg total) by mouth every 8 (eight) hours as needed for pain. Last refill.. 90 tablet 0     hydroCHLOROthiazide (HYDRODIURIL) 25 MG tablet  TAKE 1 TABLET BY MOUTH  DAILY 90 tablet 1     metoprolol succinate (TOPROL-XL) 100 MG 24 hr tablet TAKE 1 TABLET BY MOUTH  DAILY 90 tablet 1     multivitamin (MULTIVITAMIN) per tablet Take 1 tablet by mouth daily.       omeprazole (PRILOSEC) 40 MG capsule TAKE 1 CAPSULE BY MOUTH  DAILY 90 capsule 0     pantoprazole (PROTONIX) 40 MG tablet Take 1 tablet (40 mg total) by mouth daily. 90 tablet 0     nitroglycerin (NITROSTAT) 0.4 MG SL tablet        No current facility-administered medications for this visit.        VITALS:  Vitals:    08/07/19 1255   BP: 112/56   Pulse: 92   SpO2: 98%   Weight: 173 lb (78.5 kg)     Wt Readings from Last 3 Encounters:   08/07/19 173 lb (78.5 kg)   11/08/18 181 lb (82.1 kg)   11/06/18 180 lb (81.6 kg)     Body mass index is 27.92 kg/m .    PHYSICAL EXAM:  General Appearance: Alert, cooperative, no distress, appears stated age.  He does look pale but afebrile, and in no respiratory distress.  HEENT: Pupils are equal and reactive, extraocular motions is normal.  Oropharynx is moist.  Neck is supple no notable thyromegaly.  External ears are normal.  Lungs: Clear to auscultation bilaterally, respirations unlabored  Heart: Regular rate and rhythm, S1 and S2 normal, no murmur, rub, or gallop  Abdomen: Soft, nontender but slightly protruded.  Musculoskeletal: Normal range of motion. No joint swelling or deformity. Does have some pain getting up from sitting position.  Neurologic:  Alert and oriented times 3.   Psychiatric: Normal mood and affect.    MEDICATIONS:  Current Outpatient Medications   Medication Sig Dispense Refill     aspirin 325 MG tablet Take 325 mg by mouth daily.       atorvastatin (LIPITOR) 40 MG tablet Take 1 tablet (40 mg total) by mouth at bedtime. 90 tablet 2     clopidogrel (PLAVIX) 75 mg tablet Take 1 tablet (75 mg total) by mouth daily. No more refills till seen. 90 tablet 0     codeine 30 MG tablet Take 1 tablet (30 mg total) by mouth every 8 (eight) hours as needed  for pain. Last refill.. 90 tablet 0     hydroCHLOROthiazide (HYDRODIURIL) 25 MG tablet TAKE 1 TABLET BY MOUTH  DAILY 90 tablet 1     metoprolol succinate (TOPROL-XL) 100 MG 24 hr tablet TAKE 1 TABLET BY MOUTH  DAILY 90 tablet 1     multivitamin (MULTIVITAMIN) per tablet Take 1 tablet by mouth daily.       omeprazole (PRILOSEC) 40 MG capsule TAKE 1 CAPSULE BY MOUTH  DAILY 90 capsule 0     pantoprazole (PROTONIX) 40 MG tablet Take 1 tablet (40 mg total) by mouth daily. 90 tablet 0     nitroglycerin (NITROSTAT) 0.4 MG SL tablet        No current facility-administered medications for this visit.

## 2021-05-31 NOTE — ANESTHESIA PREPROCEDURE EVALUATION
Anesthesia Evaluation      Patient summary reviewed   No history of anesthetic complications     Airway   Mallampati: II  Neck ROM: full   Pulmonary - normal exam   (+) shortness of breath, sleep apnea,                          Cardiovascular - normal exam  (+) hypertension, CAD, CABG/stent, angina, ,     ECG reviewed        Neuro/Psych    (+) neuromuscular disease,      Endo/Other       GI/Hepatic/Renal            Dental - normal exam                        Anesthesia Plan  Planned anesthetic: general endotracheal    ASA 3   Induction: intravenous   Anesthetic plan and risks discussed with: patient  Anesthesia plan special considerations: antiemetics,   Post-op plan: routine recovery

## 2021-05-31 NOTE — TELEPHONE ENCOUNTER
Patient Returning Call  Reason for call:  Appointment  Information relayed to patient:  Yes, rescheduled appointment for 9/04.  Patient has additional questions:  No  If YES, what are your questions/concerns:    Okay to leave a detailed message?: No call back needed

## 2021-05-31 NOTE — TELEPHONE ENCOUNTER
Left message for pt to call back     appt for today needs to be rescheduled  Provider it out today     Thank you   Linda Hurtado, CMA

## 2021-05-31 NOTE — TELEPHONE ENCOUNTER
Critical lab result:    Hemoglobin:  6.0  Drawn today at 1327  Ordered by Dr. Manriquez    6:50pm Paged on-call provider:  Dr. Virginia Wadsworth.  Per Dr. Wadsworth- contact patient and inform him that his iron level is critically low.  It would be recommended that he be evaluated now at the ED for this.  If he is having no worsening symptoms and refuses ED - patient should follow up on this ASAP.  Dr. Wadsworth requests call back after speaking with patient and is willing to speak with him as well if needed.    6:58pm attempt made to contact patient to relay Dr. Wadsworth.  No answer - message left for patient to call back.    7:10pm patient returned call.  Recommendations from Dr. Wadsworth relayed to patient.  Patient declines ED tonight.  Critically low iron level emphasized to patient.  Patient still declines ED tonight.  States he has had worsening symptoms over the last 2 years.  He says he will go to the ED in the morning.    7:14pm contacted Dr. Wadsworth with update on patient's decision.    Tammy Calderon RN  Triage Nurse Advisor

## 2021-05-31 NOTE — ANESTHESIA POSTPROCEDURE EVALUATION
Patient: Christian Edwards  RIGHT COLECTOMY, OPEN AND UMBILICAL HERNIA REPAIR  Anesthesia type: general    Patient location: PACU  Last vitals:   Vitals Value Taken Time   /87 8/19/2019  5:46 PM   Temp 36.8  C (98.3  F) 8/19/2019  4:55 PM   Pulse 83 8/19/2019  5:49 PM   Resp 23 8/19/2019  5:49 PM   SpO2 100 % 8/19/2019  5:49 PM   Vitals shown include unvalidated device data.  Post vital signs: stable  Level of consciousness: awake and responds to simple questions  Post-anesthesia pain: pain controlled  Post-anesthesia nausea and vomiting: no  Pulmonary: unassisted, return to baseline  Cardiovascular: stable and blood pressure at baseline  Hydration: adequate  Anesthetic events: no    QCDR Measures:  ASA# 11 - Bee-op Cardiac Arrest: ASA11B - Patient did NOT experience unanticipated cardiac arrest  ASA# 12 - Bee-op Mortality Rate: ASA12B - Patient did NOT die  ASA# 13 - PACU Re-Intubation Rate: ASA13B - Patient did NOT require a new airway mgmt  ASA# 10 - Composite Anes Safety: ASA10A - No serious adverse event    Additional Notes:

## 2021-05-31 NOTE — PROGRESS NOTES
MTM Transition of Care Encounter  Assessment & Plan                                                     Post Discharge Medication Reconciliation Status: discharge medications reconciled and changed, per note/orders (see AVS)    Anemia/colon cancer: Patient is feeling good, pain is managed by oxycodone.  Recommended that he discuss pain tomorrow with PCP, could consider switching back to codeine.  Consider rechecking hemoglobin level tomorrow.    Pneumonia: Patient completed course of antibiotics and is asymptomatic.    PE/DVT: Patient is having no issues with Lovenox injection.  Recommended that he discuss with PCP tomorrow about duration of treatment.  Could consider switching to 80 mg strength in the future, but pushing out 10 mg before injecting.    CAD: Stable, no chest pain so far despite not starting isosorbide yet.  Patient is on a high intensity statin which is appropriate due to ASCVD history.  Consider checking lipids in the future due to atorvastatin dose increased.  Review to monitor for myalgias.    Hypertension: Blood pressure most recently was well controlled, will be rechecked tomorrow.  Reviewed that isosorbide addition will likely lower his blood pressure further.    GERD: Improved.  Patient to remain on omeprazole at this time.    Follow Up  As needed with MTM    Subjective & Objective                                                       Christian Edwards is a 63 y.o. male called for a transitions of care visit. he was discharged from [facility] on 8/25/2019 for anemia.    Chief Complaint: feeling great.     Medication Adherence/Access: Was unable to get isosorbide, but planning on picking up today.    Anemia/colon cancer: Admitted for hemoglobin of 6 after appointment from PCP, without any source of bleeding. Underwent colonoscopy on 8/13 which showed cecal mass, biopsy confirmed mucinous adenocarcinoma, underwent right hemicolectomy by Dr. Lowe on 08/19.  Started on oxycodone 5 mg as  needed due to postsurgical pain.  Reports that he has 2 tablets left, has been taking twice daily.  Has been helpful.  Picked up Tylenol today that he will take as needed.  Denies constipation.  Is currently not taking codeine 30 mg right now, which he was taking for back pain.  Last hemoglobin level = 7.6 on 8/25/2019  Appointment with cancer care on 9/16/2019    Pneumonia: Completed course of azithromycin and cephalexin.  Reports that he was having a lot of phelgm that he couldn't cough up, but now he is having no phlegm or shortness of breath.    PE/DVT: Found to have acute small burden PE on right upper lobe and DVT on LLE with IVC placement on 8/9/2019.  Placed on Lovenox 30 mg +40 mg twice daily.  He is not sure how long he will be on this.  Denies any shortness of breath and no issues with his leg.  Is having a lot of bruising, otherwise no significant bleeding.   Appointment with pulmonology on 10/2/2019    CAD: Patient has a history of STEMI in 2002 in 2018 and stent history in 2018.  In the hospital he developed sudden onset of chest discomfort and underwent angiogram which showed moderate CAD.  Currently taking isosorbide dinitrate 10 mg 3 times daily, atorvastatin 80 mg and aspirin 81 mg.  Atorvastatin was increased from 40 mg.  Aspirin was decreased from 325 mg to 81 mg.  Clopidogrel discontinued.  Denies any myalgias.  Admits that he did not get isosorbide on discharge, but it was transferred to [facility] and he plans on picking up today.  Denies any chest pain since home.  Last lipids checked 8/7/2019    Hypertension: Currently taking metoprolol succinate 100 mg daily.  Patient will be starting isosorbide soon.  Hydrochlorothiazide discontinued.   BP Readings from Last 3 Encounters:   08/24/19 111/71   08/12/19 151/84   08/08/19 152/78     GERD: Currently taking omeprazole 40 mg daily.  In the hospital was off and he had horrible heartburn, now improved.       PMH: reviewed in EPIC   Allergies/ADRs:  reviewed in EPIC   Alcohol: reviewed in EPIC  Tobacco:   Social History     Tobacco Use   Smoking Status Former Smoker     Last attempt to quit: 2002     Years since quittin.6   Smokeless Tobacco Never Used     Recent Vitals:   BP Readings from Last 3 Encounters:   19 111/71   19 151/84   19 152/78      Wt Readings from Last 3 Encounters:   19 153 lb (69.4 kg)   19 172 lb 8 oz (78.2 kg)   19 173 lb (78.5 kg)     ----------------    The patient declined an after visit summary    I spent 30 minutes with this patient today;  . All changes were made via collaborative practice agreement with Jeremías Manriquez MD. A copy of the visit note was provided to the patient's provider.     Brittany James, Pharm.D., [facility]  Medication Therapy Management Pharmacist  [facility]     Current Outpatient Medications   Medication Sig Dispense Refill     acetaminophen (TYLENOL) 500 MG tablet Take 1-2 tablets (500-1,000 mg total) by mouth every 4 (four) hours as needed.  0     aspirin 81 MG EC tablet Take 1 tablet (81 mg total) by mouth daily.  0     atorvastatin (LIPITOR) 80 MG tablet Take 1 tablet (80 mg total) by mouth at bedtime. 30 tablet 0     azithromycin (ZITHROMAX) 250 MG tablet Take  250 mg (1 tablet) on 19. 1 tablet 0     codeine 30 MG tablet Take 1 tablet (30 mg total) by mouth every 8 (eight) hours as needed for pain. Last refill.. 90 tablet 0     enoxaparin (LOVENOX) 30 mg/0.3 mL syringe Inject 0.3 mL (30 mg total) under the skin every 12 (twelve) hours for 14 days. To make 70 mg of lovenox in total. 28 Syringe 0     enoxaparin (LOVENOX) 40 mg/0.4 mL syringe Inject 0.4 mL (40 mg total) under the skin every 12 (twelve) hours for 14 days. To make 70 mg of lovenox in total. 28 Syringe 0     isosorbide dinitrate (ISORDIL) 10 MG tablet Take 1 tablet (10 mg total) by mouth 3 (three) times a day at 8:00 am, 12:00 noon and 6:00 pm. 90 tablet 0      metoprolol succinate (TOPROL-XL) 100 MG 24 hr tablet TAKE 1 TABLET BY MOUTH  DAILY 90 tablet 1     multivitamin (MULTIVITAMIN) per tablet Take 1 tablet by mouth daily.       nitroglycerin (NITROSTAT) 0.4 MG SL tablet Place 0.4 mg under the tongue every 5 (five) minutes as needed.              omeprazole (PRILOSEC) 40 MG capsule TAKE 1 CAPSULE BY MOUTH  DAILY 90 capsule 0     No current facility-administered medications for this visit.

## 2021-05-31 NOTE — ANESTHESIA PREPROCEDURE EVALUATION
Anesthesia Evaluation      Patient summary reviewed   No history of anesthetic complications     Airway   Mallampati: II  Neck ROM: full   Pulmonary - normal exam   (+) shortness of breath, sleep apnea,                          Cardiovascular - normal exam  (+) hypertension, CAD, angina, ,      Neuro/Psych    (+) neuromuscular disease,      Endo/Other       GI/Hepatic/Renal            Dental    (+) poor dentition                       Anesthesia Plan  Planned anesthetic: MAC    ASA 3     Anesthetic plan and risks discussed with: patient  Anesthesia plan special considerations: antiemetics,   Post-op plan: routine recovery

## 2021-05-31 NOTE — TELEPHONE ENCOUNTER
Medication Question or Clarification  Who is calling: Pharmacy: [facility]  What medication are you calling about? (include dose and sig)   omeprazole (PRILOSEC) 40 MG capsule 90 capsule 0 7/29/2019     Sig: TAKE 1 CAPSULE BY MOUTH  DAILY        Who prescribed the medication?: Dr Manriquez  What is your question/concern?: Pharmacy states this medication causes Plavix to decrease it's affect.  Please call pharmacy to see what would be better.  Pharmacy: [facility] mail  Okay to leave a detailed message?: Yes  Site CMT - Please call the pharmacy to obtain any additional needed information.

## 2021-05-31 NOTE — ANESTHESIA POSTPROCEDURE EVALUATION
Patient: Christian Edwards  COLONOSCOPY WITH BIOPSY  Anesthesia type: MAC    Patient location: ICU  Last vitals:   Vitals Value Taken Time   /76 8/13/2019  8:00 PM   Temp 36.8  C (98.2  F) 8/13/2019  8:00 PM   Pulse 92 8/13/2019  8:54 PM   Resp 16 8/13/2019  8:00 PM   SpO2 99 % 8/13/2019  8:54 PM   Vitals shown include unvalidated device data.  Post vital signs: stable  Level of consciousness: awake and responds to simple questions  Post-anesthesia pain: pain controlled  Post-anesthesia nausea and vomiting: no  Pulmonary: unassisted  Cardiovascular: stable  Hydration: adequate  Anesthetic events: no    QCDR Measures:  ASA# 11 - Bee-op Cardiac Arrest: ASA11B - Patient did NOT experience unanticipated cardiac arrest  ASA# 12 - Bee-op Mortality Rate: ASA12B - Patient did NOT die  ASA# 13 - PACU Re-Intubation Rate: NA - No ETT / LMA used for case  ASA# 10 - Composite Anes Safety: ASA10A - No serious adverse event    Additional Notes:

## 2021-05-31 NOTE — PROGRESS NOTES
Call placed to Milton regarding his upcoming appointment with Dr Sy. This has been scheduled for Monday September 16th 2019 at 11:00 am with Dr Sy at [facility], with a nurse appt at 10:30 am. USPS mail sent to Milton with introduction letter, MD card, [facility] intake form, medication and allergy list,  and appointment letter. Work up for colon cancer has been done at [facility].      Will work with medical records to ensure any needed records are collected.      Explained that I would be Milton's nurse navigator.  I generally provide assistance with psycho social needs including but not limited to, financial assistance, obtaining education materials, transportation assistance, help with meals, community programs, help finding support or support groups, and getting connected with our psychotherapist when needed.  I am also here to help guide you through our system.      I am in the office Monday thru Friday. [phone]. I am in and out of my office throughout the day, If I do not answer my phone please leave me a message and I will get back to you as soon as possible. If you are ever unsure of who to call you can always contact me and I will help assist you in any way that I can.  Medication or symptom management needs typically go through our triage nurse who can be reached by calling the main clinic number. [phone]    Please do not hesitate to contact me if you have any questions or concerns.      Explained need to arrive at time given 10:30 am and complete the health history form and medication and allergy list and bring both to appointment.

## 2021-05-31 NOTE — ANESTHESIA CARE TRANSFER NOTE
Patient spontaneously breathing.  Tidal volumes > 400ml.  Following commands, suctioned and extubated with balloon down.  O2 via mask at 10L.  To PACU, VSS, SBAR report to RN per institutional handoff policy and procedure.  Transfer of care.    Last vitals:   Vitals:    08/19/19 1655   BP: 180/84   Pulse: 86   Resp: 28   Temp: 36.8  C (98.3  F)   SpO2: 100%     Patient's level of consciousness is drowsy  Spontaneous respirations: yes  Maintains airway independently: yes  Dentition unchanged: yes  Oropharynx: oropharynx clear of all foreign objects    QCDR Measures:  ASA# 20 - Surgical Safety Checklist: WHO surgical safety checklist completed prior to induction    PQRS# 430 - Adult PONV Prevention: 4558F - Pt received => 2 anti-emetic agents (different classes) preop & intraop  ASA# 8 - Peds PONV Prevention: NA - Not pediatric patient, not GA or 2 or more risk factors NOT present  PQRS# 424 - Bee-op Temp Management: 4559F - At least one body temp DOCUMENTED => 35.5C or 95.9F within required timeframe  PQRS# 426 - PACU Transfer Protocol: - Transfer of care checklist used  ASA# 14 - Acute Post-op Pain: ASA14A - Patient experienced pain >= 7 out of 10

## 2021-05-31 NOTE — PROGRESS NOTES
Chief Complaint   Patient presents with     Hospital Visit Follow Up     From 8/7/19 - 8/25/19. Was diagnosed with colon cancer. Feeling much better.      Back Pain     Low back pain. Was on pain medication for this but took last one last night. In pain today.      HPI: 63-year-old male presents today following his hospital admission from 8/12/2019-8/25/2019.  He has a significant medical history for CAD with 5 prior stents and EtOH abuse.  He went in for medication check with his primary care provider and his hemoglobin was found to be 6 without any source of bleeding.  CT of the abdomen was performed which demonstrated:    1.  Bulky soft tissue mass consistent with colon cancer in the cecum measuring 7 x 6 x 5.5 cm. Active faint bleeding demonstrated and blush on the CT angiogram. There are scattered mesenteric portacaval and celiac axis lymph nodes demonstrated as well as   enlarged lymph nodes coursing along the course of the inferior mesenteric vein.  2.  Extensive coronary artery calcifications.  3.  Subpleural reticulation in both lungs with traction bronchiectasis.  4.  Prostate gland enlargement.  5.  Degenerative change lumbar spine.    Patient was transfused and evaluated by colorectal surgery who recommended hemicolectomy.  Patient was also found to have a left lower extremity DVT along with small burden pulmonary embolism along the right upper lobe and so an IVC filter was placed on 8/9/2019.  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 Lowe on 8/19.  Postoperatively the patient developed a gastrointestinal bleed and so he was given fresh frozen plasma and was transfused with PRBCs.  The decision was then made to resume anticoagulation with just Lovenox.  Chest x-ray performed also showed a left lower lobe infiltrate and so he was given prescriptions for Rocephin and  azithromycin.  Hemoglobin stabilized, but unfortunately the  patient developed sudden onset chest pain which then led to an angiogram showing moderate LAD and severe right PI disease.  Cardiology recommended medical management with aspirin, statin therapy, beta-blockers, and isosorbide dinitrate.  He was following with cardiology at [facility] yearly, and is interested in getting established with [facility].    Since discharge from the hospital, the patient says that he is doing much better.  He feels fantastic outside of a little tugging sensation at his staples.  Afebrile without chills.  He has not noticed any melena or hematochezia.  No significant nosebleeds with the enoxaparin injections.  He is just picked up a prescription and says he has about 18 days worth which should be enough to get him to oncology.  The patient has chronic back pain for which he has been receiving codeine from his PCP.  He was given oxycodone in the hospital for the pain, and he says that that manages it significantly better.  He does have a history of lower back pain and previous lumbar MRI noted:    1.  Multilevel degenerative changes of the lumbar spine with mild spinal canal stenosis spanning from L3/L4 through L5/S1.     2.  At L3/L4, there is a shallow right foraminal protrusion which results in mild to moderate right foraminal stenosis.    He asks if he can get a refill of the Percocet.  He continues with bowel movements, but says that they are looser than usual.  No abdominal pains.    Post Discharge Medication Reconciliation Status: discharge medications reconciled, continue medications without change    ROS:Review of Systems - negative except for what's listed in the HPI    SH: The Patient's  reports that he quit smoking about 17 years ago. He has never used smokeless tobacco. He reports that he drinks about 21.6 oz of alcohol per week. He reports that he has current or past drug history. Drug: Marijuana.      FH: The Patient's family history includes COPD in his mother; Heart  "disease in his father; No Medical Problems in his sister; Sleep apnea in his brother.     Meds:    Current Outpatient Medications on File Prior to Visit   Medication Sig Dispense Refill     acetaminophen (TYLENOL) 500 MG tablet Take 1-2 tablets (500-1,000 mg total) by mouth every 4 (four) hours as needed.  0     aspirin 81 MG EC tablet Take 1 tablet (81 mg total) by mouth daily.  0     atorvastatin (LIPITOR) 80 MG tablet Take 1 tablet (80 mg total) by mouth at bedtime. 30 tablet 0     enoxaparin (LOVENOX) 30 mg/0.3 mL syringe Inject 0.3 mL (30 mg total) under the skin every 12 (twelve) hours for 14 days. To make 70 mg of lovenox in total. 28 Syringe 0     enoxaparin (LOVENOX) 40 mg/0.4 mL syringe Inject 0.4 mL (40 mg total) under the skin every 12 (twelve) hours for 14 days. To make 70 mg of lovenox in total. 28 Syringe 0     isosorbide dinitrate (ISORDIL) 10 MG tablet Take 1 tablet (10 mg total) by mouth 3 (three) times a day at 8:00 am, 12:00 noon and 6:00 pm. 90 tablet 0     metoprolol succinate (TOPROL-XL) 100 MG 24 hr tablet TAKE 1 TABLET BY MOUTH  DAILY 90 tablet 1     multivitamin (MULTIVITAMIN) per tablet Take 1 tablet by mouth daily.       omeprazole (PRILOSEC) 40 MG capsule TAKE 1 CAPSULE BY MOUTH  DAILY 90 capsule 0     codeine 30 MG tablet Take 1 tablet (30 mg total) by mouth every 8 (eight) hours as needed for pain. Last refill.. 90 tablet 0     nitroglycerin (NITROSTAT) 0.4 MG SL tablet Place 0.4 mg under the tongue every 5 (five) minutes as needed.              No current facility-administered medications on file prior to visit.        O:  /64   Pulse 86   Temp 98.1  F (36.7  C) (Oral)   Ht 5' 8\" (1.727 m)   Wt 159 lb (72.1 kg)   SpO2 98%   BMI 24.18 kg/m      Physical Examination:   General appearance - alert, well appearing, and in no distress  Mental status - alert, oriented to person, place, and time  Eyes - pupils equal and reactive, extraocular eye movements intact  Ears - bilateral " TM's and external ear canals normal  Mouth - mucous membranes moist, pharynx normal without lesions  Neck - supple, no significant adenopathy  Lymphatics - no palpable lymphadenopathy, no hepatosplenomegaly  Chest - clear to auscultation, no wheezes, rales or rhonchi, symmetric air entry  Heart - normal rate and regular rhythm, S1 and S2 normal, no murmurs noted  Abdomen - soft, nontender, nondistended.  Bowel sounds active through all 4 quadrants.  Neurological - alert, oriented, normal speech, no focal findings or movement disorder noted, neck supple without rigidity, cranial nerves II through XII intact, motor and sensory grossly normal bilaterally, normal muscle tone, no tremors, strength 5/5  Musculoskeletal - no joint tenderness, deformity or swelling  Extremities - peripheral pulses normal, no pedal edema, no clubbing or cyanosis  Skin -incision is clean dry and intact.  Well approximated.  Slight pink coloration along the staple insertion sites without any signs or symptoms of infection.  No discharge noted.      A/P:     Problem List Items Addressed This Visit        ENT/CARD/PULM/ENDO Problems    Coronary artery disease due to calcified coronary lesion    Other acute pulmonary embolism without acute cor pulmonale (H)    Deep vein thrombosis (DVT) of popliteal vein of left lower extremity, unspecified chronicity (H)       Other    Alcohol abuse    Lumbar Disc Degeneration    Anemia      Other Visit Diagnoses     Mucinous adenocarcinoma of colon (H)    -  Primary    Relevant Medications    oxyCODONE-acetaminophen (PERCOCET/ENDOCET) 5-325 mg per tablet    Other Relevant Orders    Comprehensive Metabolic Panel    HM1(CBC and Differential) (Completed)    HM1 (CBC with Diff) (Completed)    History of ST elevation myocardial infarction (STEMI)        Relevant Orders    Ambulatory referral to Cardiology            1. Mucinous adenocarcinoma of colon (H)  New diagnosis.  Meets with oncology and surgery soon.    -  Comprehensive Metabolic Panel  - HM1(CBC and Differential)  - HM1 (CBC with Diff)  - oxyCODONE-acetaminophen (PERCOCET/ENDOCET) 5-325 mg per tablet; Take 1 tablet by mouth every 6 (six) hours as needed for pain.  Dispense: 30 tablet; Refill: 0    2. History of ST elevation myocardial infarction (STEMI)  Currently medically managed with aspirin, statin, beta-blocker, and isosorbide dinitrate.  Interested in reestablishing with cardiology in the [facility] system.  Referral placed.    - Ambulatory referral to Cardiology    3. Anemia, unspecified type  Stable at discharge.  Recheck.    4. Lumbar Disc Degeneration  Long-standing issue.  Does derive some benefit with the oxycodone.  Discussed that this is not a long-term solution, and that if he continues to need refills, we will have to investigate further.    5. Alcohol abuse  Long-standing issue.  He has been without alcohol for the past 3 weeks now.  No signs or symptoms of withdrawal.    6. Deep vein thrombosis (DVT) of popliteal vein of left lower extremity, unspecified chronicity (H)  Anticoagulated with enoxaparin.  Hoping to get on warfarin.  I informed the patient that hematology/oncology will make that decision.    7. Other acute pulmonary embolism without acute cor pulmonale (H)  Anticoagulated with enoxaparin.    8. Coronary artery disease due to calcified coronary lesion  Establish with [facility] cardiology.     9.  Hospital-acquired pneumonia  Lungs clear to auscultation today.  No signs or symptoms of infection currently.    Antonio Cota, CNP

## 2021-05-31 NOTE — PROGRESS NOTES
Hospital discharge follow up call to pt, no answer.  Left VM message reminding patient of their appt 8/30/19 at 9:20am.  RN will attempt call back at another time.    Soo Guajardo RN Care Manager, Population Health

## 2021-05-31 NOTE — ANESTHESIA CARE TRANSFER NOTE
Last vitals:   Vitals:    08/13/19 1454   BP: 97/60   Pulse: 89   Resp: 17   Temp: 36.7  C (98.1  F)   SpO2: 96%     Patient's level of consciousness is drowsy  Spontaneous respirations: yes  Maintains airway independently: yes  Dentition unchanged: yes  Oropharynx: oropharynx clear of all foreign objects    QCDR Measures:  ASA# 20 - Surgical Safety Checklist: WHO surgical safety checklist completed prior to induction    PQRS# 430 - Adult PONV Prevention: 4558F - Pt received => 2 anti-emetic agents (different classes) preop & intraop  ASA# 8 - Peds PONV Prevention: NA - Not pediatric patient, not GA or 2 or more risk factors NOT present  PQRS# 424 - Bee-op Temp Management: 4559F - At least one body temp DOCUMENTED => 35.5C or 95.9F within required timeframe  PQRS# 426 - PACU Transfer Protocol: - Transfer of care checklist used  ASA# 14 - Acute Post-op Pain: NA - Patient under age 10y or did not go to PACU

## 2021-06-01 ENCOUNTER — COMMUNICATION - HEALTHEAST (OUTPATIENT)
Dept: SCHEDULING | Facility: CLINIC | Age: 66
End: 2021-06-01

## 2021-06-01 ENCOUNTER — RECORDS - HEALTHEAST (OUTPATIENT)
Dept: ADMINISTRATIVE | Facility: CLINIC | Age: 66
End: 2021-06-01

## 2021-06-01 VITALS — WEIGHT: 180.2 LBS | BODY MASS INDEX: 29.09 KG/M2

## 2021-06-01 VITALS — BODY MASS INDEX: 29.21 KG/M2 | WEIGHT: 181 LBS

## 2021-06-01 VITALS — WEIGHT: 180 LBS | BODY MASS INDEX: 29.05 KG/M2

## 2021-06-01 VITALS — WEIGHT: 179 LBS | BODY MASS INDEX: 28.89 KG/M2

## 2021-06-01 VITALS — BODY MASS INDEX: 30.83 KG/M2 | WEIGHT: 191 LBS

## 2021-06-01 VITALS — WEIGHT: 180.4 LBS | BODY MASS INDEX: 29.12 KG/M2

## 2021-06-01 VITALS — WEIGHT: 181 LBS | BODY MASS INDEX: 29.21 KG/M2

## 2021-06-01 VITALS — BODY MASS INDEX: 29.05 KG/M2 | WEIGHT: 180 LBS

## 2021-06-01 VITALS — WEIGHT: 179.4 LBS | BODY MASS INDEX: 28.96 KG/M2

## 2021-06-01 VITALS — WEIGHT: 178.6 LBS | BODY MASS INDEX: 28.83 KG/M2

## 2021-06-01 VITALS — BODY MASS INDEX: 28.89 KG/M2 | WEIGHT: 179 LBS

## 2021-06-01 VITALS — WEIGHT: 181.8 LBS | BODY MASS INDEX: 29.34 KG/M2

## 2021-06-01 VITALS — BODY MASS INDEX: 29.52 KG/M2 | WEIGHT: 182.9 LBS

## 2021-06-01 VITALS — WEIGHT: 181.4 LBS | BODY MASS INDEX: 29.28 KG/M2

## 2021-06-01 DIAGNOSIS — E87.6 HYPOKALEMIA: ICD-10-CM

## 2021-06-01 NOTE — TELEPHONE ENCOUNTER
RN cannot approve Refill Request    RN can NOT refill this medication Protocol failed and NO refill given and medication not on med list.       Cnidy Tadeo, [facility] Triage/Med Refill 9/16/2019    Requested Prescriptions   Pending Prescriptions Disp Refills     clopidogrel (PLAVIX) 75 mg tablet [Pharmacy Med Name: CLOPIDOGREL  75MG  TAB] 90 tablet 0     Sig: TAKE 1 TABLET BY MOUTH  DAILY. NO MORE REFILLS TILL SEEN.       Clopidogrel/Prasugrel/Ticagrelor Refill Protocol Passed - 9/16/2019  2:45 PM        Passed - PCP or prescribing provider visit in past 6 months       Last office visit with prescriber/PCP: 8/7/2019 OR same dept: 8/7/2019 Jeremías Manriquez MD OR same specialty: 8/30/2019 Antonio Cota CNP Last physical: Visit date not found Last MTM visit: Visit date not found     Next appt within 3 mo: Visit date not found  Next physical within 3 mo: Visit date not found  Prescriber OR PCP: Jeremías Manriquez MD  Last diagnosis associated with med order: 1. Hx of heart artery stent  - clopidogrel (PLAVIX) 75 mg tablet [Pharmacy Med Name: CLOPIDOGREL  75MG  TAB]; TAKE 1 TABLET BY MOUTH  DAILY. NO MORE REFILLS TILL SEEN.  Dispense: 90 tablet; Refill: 0    2. Hyperlipidemia  - atorvastatin (LIPITOR) 20 MG tablet; TAKE 1 TABLET BY MOUTH AT  BEDTIME  Dispense: 90 tablet; Refill: 3    3. Gastroesophageal reflux disease without esophagitis  - pantoprazole (PROTONIX) 40 MG tablet [Pharmacy Med Name: PANTOPRAZOLE SOD 40MG EC TABLET]; TAKE 1 TABLET BY MOUTH  DAILY  Dispense: 90 tablet; Refill: 0    4. Essential hypertension  - metoprolol succinate (TOPROL-XL) 100 MG 24 hr tablet; TAKE 1 TABLET BY MOUTH  DAILY  Dispense: 90 tablet; Refill: 3    If protocol passes may refill for 6 months if within 3 months of last provider visit (or a total of 9 months).              Passed - Hemoglobin in past 12 months     Hemoglobin   Date Value Ref Range Status   08/30/2019 8.0 (L) 14.0 - 18.0 g/dL Final      Hgb   Date Value Ref Range Status   05/17/2017 15.7 7.0 g/dL Final             pantoprazole (PROTONIX) 40 MG tablet [Pharmacy Med Name: PANTOPRAZOLE SOD 40MG EC TABLET] 90 tablet 0     Sig: TAKE 1 TABLET BY MOUTH  DAILY       GI Medications Refill Protocol Passed - 9/16/2019  2:45 PM        Passed - PCP or prescribing provider visit in last 12 or next 3 months.     Last office visit with prescriber/PCP: 8/7/2019 Jeremías Manriquez MD OR same dept: 8/7/2019 Jeremías Manriquez MD OR same specialty: 8/30/2019 Antonio Cota CNP  Last physical: Visit date not found Last MTM visit: Visit date not found   Next visit within 3 mo: Visit date not found  Next physical within 3 mo: Visit date not found  Prescriber OR PCP: Jeremías Manriquez MD  Last diagnosis associated with med order: 1. Hx of heart artery stent  - clopidogrel (PLAVIX) 75 mg tablet [Pharmacy Med Name: CLOPIDOGREL  75MG  TAB]; TAKE 1 TABLET BY MOUTH  DAILY. NO MORE REFILLS TILL SEEN.  Dispense: 90 tablet; Refill: 0    2. Hyperlipidemia  - atorvastatin (LIPITOR) 20 MG tablet; TAKE 1 TABLET BY MOUTH AT  BEDTIME  Dispense: 90 tablet; Refill: 3    3. Gastroesophageal reflux disease without esophagitis  - pantoprazole (PROTONIX) 40 MG tablet [Pharmacy Med Name: PANTOPRAZOLE SOD 40MG EC TABLET]; TAKE 1 TABLET BY MOUTH  DAILY  Dispense: 90 tablet; Refill: 0    4. Essential hypertension  - metoprolol succinate (TOPROL-XL) 100 MG 24 hr tablet; TAKE 1 TABLET BY MOUTH  DAILY  Dispense: 90 tablet; Refill: 3    If protocol passes may refill for 12 months if within 3 months of last provider visit (or a total of 15 months).             hydroCHLOROthiazide (HYDRODIURIL) 25 MG tablet [Pharmacy Med Name: HYDROCHLOROTHIAZIDE  25MG  TAB] 90 tablet      Sig: TAKE 1 TABLET BY MOUTH  DAILY       Diuretics/Combination Diuretics Refill Protocol  Passed - 9/16/2019  2:45 PM        Passed - Visit with PCP or prescribing provider visit in past 12 months      Last office visit with prescriber/PCP: 8/7/2019 Jeremías Manriquez MD OR same dept: 8/7/2019 Jeremías Manriquez MD OR same specialty: 8/30/2019 Antonio Cota CNP  Last physical: Visit date not found Last MTM visit: Visit date not found   Next visit within 3 mo: Visit date not found  Next physical within 3 mo: Visit date not found  Prescriber OR PCP: Jeremías Manriquez MD  Last diagnosis associated with med order: 1. Hx of heart artery stent  - clopidogrel (PLAVIX) 75 mg tablet [Pharmacy Med Name: CLOPIDOGREL  75MG  TAB]; TAKE 1 TABLET BY MOUTH  DAILY. NO MORE REFILLS TILL SEEN.  Dispense: 90 tablet; Refill: 0    2. Hyperlipidemia  - atorvastatin (LIPITOR) 20 MG tablet; TAKE 1 TABLET BY MOUTH AT  BEDTIME  Dispense: 90 tablet; Refill: 3    3. Gastroesophageal reflux disease without esophagitis  - pantoprazole (PROTONIX) 40 MG tablet [Pharmacy Med Name: PANTOPRAZOLE SOD 40MG EC TABLET]; TAKE 1 TABLET BY MOUTH  DAILY  Dispense: 90 tablet; Refill: 0    4. Essential hypertension  - metoprolol succinate (TOPROL-XL) 100 MG 24 hr tablet; TAKE 1 TABLET BY MOUTH  DAILY  Dispense: 90 tablet; Refill: 3    If protocol passes may refill for 12 months if within 3 months of last provider visit (or a total of 15 months).             Passed - Serum Potassium in past 12 months      Lab Results   Component Value Date    Potassium 4.6 08/30/2019             Passed - Serum Sodium in past 12 months      Lab Results   Component Value Date    Sodium 139 08/30/2019             Passed - Blood pressure on file in past 12 months     BP Readings from Last 1 Encounters:   09/16/19 130/65             Passed - Serum Creatinine in past 12 months      Creatinine   Date Value Ref Range Status   08/30/2019 0.74 0.70 - 1.30 mg/dL Final           Signed Prescriptions Disp Refills    atorvastatin (LIPITOR) 20 MG tablet 90 tablet 3     Sig: TAKE 1 TABLET BY MOUTH AT  BEDTIME       Statins Refill Protocol (Hmg CoA  Reductase Inhibitors) Passed - 9/16/2019  2:45 PM        Passed - PCP or prescribing provider visit in past 12 months      Last office visit with prescriber/PCP: 8/7/2019 Jeremías Manriquez MD OR same dept: 8/7/2019 Jeremías Manriquez MD OR same specialty: 8/30/2019 Antonio Cota CNP  Last physical: Visit date not found Last MTM visit: Visit date not found   Next visit within 3 mo: Visit date not found  Next physical within 3 mo: Visit date not found  Prescriber OR PCP: Jeremías Manriquez MD  Last diagnosis associated with med order: 1. Hx of heart artery stent  - clopidogrel (PLAVIX) 75 mg tablet [Pharmacy Med Name: CLOPIDOGREL  75MG  TAB]; TAKE 1 TABLET BY MOUTH  DAILY. NO MORE REFILLS TILL SEEN.  Dispense: 90 tablet; Refill: 0    2. Hyperlipidemia  - atorvastatin (LIPITOR) 20 MG tablet; TAKE 1 TABLET BY MOUTH AT  BEDTIME  Dispense: 90 tablet; Refill: 3    3. Gastroesophageal reflux disease without esophagitis  - pantoprazole (PROTONIX) 40 MG tablet [Pharmacy Med Name: PANTOPRAZOLE SOD 40MG EC TABLET]; TAKE 1 TABLET BY MOUTH  DAILY  Dispense: 90 tablet; Refill: 0    4. Essential hypertension  - metoprolol succinate (TOPROL-XL) 100 MG 24 hr tablet; TAKE 1 TABLET BY MOUTH  DAILY  Dispense: 90 tablet; Refill: 3    If protocol passes may refill for 12 months if within 3 months of last provider visit (or a total of 15 months).            metoprolol succinate (TOPROL-XL) 100 MG 24 hr tablet 90 tablet 3     Sig: TAKE 1 TABLET BY MOUTH  DAILY       Beta-Blockers Refill Protocol Passed - 9/16/2019  2:45 PM        Passed - PCP or prescribing provider visit in past 12 months or next 3 months     Last office visit with prescriber/PCP: 8/7/2019 Jeremías Manriquez MD OR same dept: 8/7/2019 Jeremías Manriquez MD OR same specialty: 8/30/2019 Antonio Cota CNP  Last physical: Visit date not found Last MTM visit: Visit date not found   Next visit within 3 mo: Visit date not  found  Next physical within 3 mo: Visit date not found  Prescriber OR PCP: Jeremías Manriquez MD  Last diagnosis associated with med order: 1. Hx of heart artery stent  - clopidogrel (PLAVIX) 75 mg tablet [Pharmacy Med Name: CLOPIDOGREL  75MG  TAB]; TAKE 1 TABLET BY MOUTH  DAILY. NO MORE REFILLS TILL SEEN.  Dispense: 90 tablet; Refill: 0    2. Hyperlipidemia  - atorvastatin (LIPITOR) 20 MG tablet; TAKE 1 TABLET BY MOUTH AT  BEDTIME  Dispense: 90 tablet; Refill: 3    3. Gastroesophageal reflux disease without esophagitis  - pantoprazole (PROTONIX) 40 MG tablet [Pharmacy Med Name: PANTOPRAZOLE SOD 40MG EC TABLET]; TAKE 1 TABLET BY MOUTH  DAILY  Dispense: 90 tablet; Refill: 0    4. Essential hypertension  - metoprolol succinate (TOPROL-XL) 100 MG 24 hr tablet; TAKE 1 TABLET BY MOUTH  DAILY  Dispense: 90 tablet; Refill: 3    If protocol passes may refill for 12 months if within 3 months of last provider visit (or a total of 15 months).             Passed - Blood pressure filed in past 12 months     BP Readings from Last 1 Encounters:   09/16/19 130/65

## 2021-06-01 NOTE — TELEPHONE ENCOUNTER
Refill Approved    Rx renewed per Medication Renewal Policy. Medication was last renewed on 8/25/19.    Cindy Tadeo, [facility] Triage/Med Refill 9/20/2019     Requested Prescriptions   Pending Prescriptions Disp Refills     isosorbide dinitrate (ISORDIL) 10 MG tablet 90 tablet 0     Sig: Take 1 tablet (10 mg total) by mouth 3 (three) times a day at 8:00 am, 12:00 noon and 6:00 pm.       Isosorbide Refill Protocol Passed - 9/20/2019  3:26 PM        Passed - Visit with PCP or prescribing provider visit in last 6 months or next 3 months     Last office visit with prescriber/PCP: 8/7/2019 OR same dept: 8/7/2019 Jeremías Manriquez MD OR same specialty: 8/30/2019 Antonio Cota CNP Last physical: Visit date not found Last MTM visit: Visit date not found     Next appt within 3 mo: Visit date not found  Next physical within 3 mo: Visit date not found  Prescriber OR PCP: Jeremías Manriquez MD  Last diagnosis associated with med order: 1. NSTEMI (non-ST elevated myocardial infarction) (H)  - isosorbide dinitrate (ISORDIL) 10 MG tablet; Take 1 tablet (10 mg total) by mouth 3 (three) times a day at 8:00 am, 12:00 noon and 6:00 pm.  Dispense: 90 tablet; Refill: 0    If protocol passes may refill for 6 months if within 3 months of last provider visit (or a total of 9 months).              Passed - Blood pressure filed in past 12 months     BP Readings from Last 1 Encounters:   09/16/19 130/65

## 2021-06-01 NOTE — TELEPHONE ENCOUNTER
Refill Approved    Rx renewed per Medication Renewal Policy. Medication was last renewed on 2/4/19.8/25/19    Cindy Tadeo, [facility] Triage/Med Refill 9/16/2019     Requested Prescriptions   Pending Prescriptions Disp Refills     clopidogrel (PLAVIX) 75 mg tablet [Pharmacy Med Name: CLOPIDOGREL  75MG  TAB] 90 tablet 0     Sig: TAKE 1 TABLET BY MOUTH  DAILY. NO MORE REFILLS TILL SEEN.       Clopidogrel/Prasugrel/Ticagrelor Refill Protocol Passed - 9/16/2019  2:45 PM        Passed - PCP or prescribing provider visit in past 6 months       Last office visit with prescriber/PCP: 8/7/2019 OR same dept: 8/7/2019 Jeremías Manriquez MD OR same specialty: 8/30/2019 Antonio Cota CNP Last physical: Visit date not found Last MTM visit: Visit date not found     Next appt within 3 mo: Visit date not found  Next physical within 3 mo: Visit date not found  Prescriber OR PCP: Jeremías Manriquez MD  Last diagnosis associated with med order: 1. Hx of heart artery stent  - clopidogrel (PLAVIX) 75 mg tablet [Pharmacy Med Name: CLOPIDOGREL  75MG  TAB]; TAKE 1 TABLET BY MOUTH  DAILY. NO MORE REFILLS TILL SEEN.  Dispense: 90 tablet; Refill: 0    2. Hyperlipidemia  - atorvastatin (LIPITOR) 20 MG tablet [Pharmacy Med Name: ATORVASTATIN  20MG  TAB]; TAKE 1 TABLET BY MOUTH AT  BEDTIME  Dispense: 90 tablet; Refill: 0    3. Gastroesophageal reflux disease without esophagitis  - pantoprazole (PROTONIX) 40 MG tablet [Pharmacy Med Name: PANTOPRAZOLE SOD 40MG EC TABLET]; TAKE 1 TABLET BY MOUTH  DAILY  Dispense: 90 tablet; Refill: 0    4. Essential hypertension  - metoprolol succinate (TOPROL-XL) 100 MG 24 hr tablet [Pharmacy Med Name: METOPROLOL SUCC ER 100MG TABLET]; TAKE 1 TABLET BY MOUTH  DAILY  Dispense: 90 tablet; Refill: 1    If protocol passes may refill for 6 months if within 3 months of last provider visit (or a total of 9 months).              Passed - Hemoglobin in past 12 months     Hemoglobin   Date Value  Ref Range Status   08/30/2019 8.0 (L) 14.0 - 18.0 g/dL Final     Hgb   Date Value Ref Range Status   05/17/2017 15.7 7.0 g/dL Final             atorvastatin (LIPITOR) 20 MG tablet [Pharmacy Med Name: ATORVASTATIN  20MG  TAB] 90 tablet 0     Sig: TAKE 1 TABLET BY MOUTH AT  BEDTIME       Statins Refill Protocol (Hmg CoA Reductase Inhibitors) Passed - 9/16/2019  2:45 PM        Passed - PCP or prescribing provider visit in past 12 months      Last office visit with prescriber/PCP: 8/7/2019 Jeremías Manriquez MD OR same dept: 8/7/2019 Jeremías Manriquez MD OR same specialty: 8/30/2019 Antonio Cota CNP  Last physical: Visit date not found Last MTM visit: Visit date not found   Next visit within 3 mo: Visit date not found  Next physical within 3 mo: Visit date not found  Prescriber OR PCP: Jeremías Manriquez MD  Last diagnosis associated with med order: 1. Hx of heart artery stent  - clopidogrel (PLAVIX) 75 mg tablet [Pharmacy Med Name: CLOPIDOGREL  75MG  TAB]; TAKE 1 TABLET BY MOUTH  DAILY. NO MORE REFILLS TILL SEEN.  Dispense: 90 tablet; Refill: 0    2. Hyperlipidemia  - atorvastatin (LIPITOR) 20 MG tablet [Pharmacy Med Name: ATORVASTATIN  20MG  TAB]; TAKE 1 TABLET BY MOUTH AT  BEDTIME  Dispense: 90 tablet; Refill: 0    3. Gastroesophageal reflux disease without esophagitis  - pantoprazole (PROTONIX) 40 MG tablet [Pharmacy Med Name: PANTOPRAZOLE SOD 40MG EC TABLET]; TAKE 1 TABLET BY MOUTH  DAILY  Dispense: 90 tablet; Refill: 0    4. Essential hypertension  - metoprolol succinate (TOPROL-XL) 100 MG 24 hr tablet [Pharmacy Med Name: METOPROLOL SUCC ER 100MG TABLET]; TAKE 1 TABLET BY MOUTH  DAILY  Dispense: 90 tablet; Refill: 1    If protocol passes may refill for 12 months if within 3 months of last provider visit (or a total of 15 months).             pantoprazole (PROTONIX) 40 MG tablet [Pharmacy Med Name: PANTOPRAZOLE SOD 40MG EC TABLET] 90 tablet 0     Sig: TAKE 1 TABLET BY MOUTH  DAILY        GI Medications Refill Protocol Passed - 9/16/2019  2:45 PM        Passed - PCP or prescribing provider visit in last 12 or next 3 months.     Last office visit with prescriber/PCP: 8/7/2019 Jeremías Manriquez MD OR same dept: 8/7/2019 Jeremías Manriquez MD OR same specialty: 8/30/2019 Antonio Cota CNP  Last physical: Visit date not found Last MTM visit: Visit date not found   Next visit within 3 mo: Visit date not found  Next physical within 3 mo: Visit date not found  Prescriber OR PCP: Jeremías Manriquez MD  Last diagnosis associated with med order: 1. Hx of heart artery stent  - clopidogrel (PLAVIX) 75 mg tablet [Pharmacy Med Name: CLOPIDOGREL  75MG  TAB]; TAKE 1 TABLET BY MOUTH  DAILY. NO MORE REFILLS TILL SEEN.  Dispense: 90 tablet; Refill: 0    2. Hyperlipidemia  - atorvastatin (LIPITOR) 20 MG tablet [Pharmacy Med Name: ATORVASTATIN  20MG  TAB]; TAKE 1 TABLET BY MOUTH AT  BEDTIME  Dispense: 90 tablet; Refill: 0    3. Gastroesophageal reflux disease without esophagitis  - pantoprazole (PROTONIX) 40 MG tablet [Pharmacy Med Name: PANTOPRAZOLE SOD 40MG EC TABLET]; TAKE 1 TABLET BY MOUTH  DAILY  Dispense: 90 tablet; Refill: 0    4. Essential hypertension  - metoprolol succinate (TOPROL-XL) 100 MG 24 hr tablet [Pharmacy Med Name: METOPROLOL SUCC ER 100MG TABLET]; TAKE 1 TABLET BY MOUTH  DAILY  Dispense: 90 tablet; Refill: 1    If protocol passes may refill for 12 months if within 3 months of last provider visit (or a total of 15 months).             hydroCHLOROthiazide (HYDRODIURIL) 25 MG tablet [Pharmacy Med Name: HYDROCHLOROTHIAZIDE  25MG  TAB] 90 tablet      Sig: TAKE 1 TABLET BY MOUTH  DAILY       Diuretics/Combination Diuretics Refill Protocol  Passed - 9/16/2019  2:45 PM        Passed - Visit with PCP or prescribing provider visit in past 12 months     Last office visit with prescriber/PCP: 8/7/2019 Jeremías Manriquez MD OR same dept: 8/7/2019 Jeremías Manriquez  MD Jermaine OR same specialty: 8/30/2019 Antonio Cota, ANALIA  Last physical: Visit date not found Last MTM visit: Visit date not found   Next visit within 3 mo: Visit date not found  Next physical within 3 mo: Visit date not found  Prescriber OR PCP: Jeremías Manriquez MD  Last diagnosis associated with med order: 1. Hx of heart artery stent  - clopidogrel (PLAVIX) 75 mg tablet [Pharmacy Med Name: CLOPIDOGREL  75MG  TAB]; TAKE 1 TABLET BY MOUTH  DAILY. NO MORE REFILLS TILL SEEN.  Dispense: 90 tablet; Refill: 0    2. Hyperlipidemia  - atorvastatin (LIPITOR) 20 MG tablet [Pharmacy Med Name: ATORVASTATIN  20MG  TAB]; TAKE 1 TABLET BY MOUTH AT  BEDTIME  Dispense: 90 tablet; Refill: 0    3. Gastroesophageal reflux disease without esophagitis  - pantoprazole (PROTONIX) 40 MG tablet [Pharmacy Med Name: PANTOPRAZOLE SOD 40MG EC TABLET]; TAKE 1 TABLET BY MOUTH  DAILY  Dispense: 90 tablet; Refill: 0    4. Essential hypertension  - metoprolol succinate (TOPROL-XL) 100 MG 24 hr tablet [Pharmacy Med Name: METOPROLOL SUCC ER 100MG TABLET]; TAKE 1 TABLET BY MOUTH  DAILY  Dispense: 90 tablet; Refill: 1    If protocol passes may refill for 12 months if within 3 months of last provider visit (or a total of 15 months).             Passed - Serum Potassium in past 12 months      Lab Results   Component Value Date    Potassium 4.6 08/30/2019             Passed - Serum Sodium in past 12 months      Lab Results   Component Value Date    Sodium 139 08/30/2019             Passed - Blood pressure on file in past 12 months     BP Readings from Last 1 Encounters:   09/16/19 130/65             Passed - Serum Creatinine in past 12 months      Creatinine   Date Value Ref Range Status   08/30/2019 0.74 0.70 - 1.30 mg/dL Final             metoprolol succinate (TOPROL-XL) 100 MG 24 hr tablet [Pharmacy Med Name: METOPROLOL SUCC ER 100MG TABLET] 90 tablet 1     Sig: TAKE 1 TABLET BY MOUTH  DAILY       Beta-Blockers Refill Protocol Passed -  9/16/2019  2:45 PM        Passed - PCP or prescribing provider visit in past 12 months or next 3 months     Last office visit with prescriber/PCP: 8/7/2019 Jeremías Manriquez MD OR same dept: 8/7/2019 Jeremías Manriquez MD OR same specialty: 8/30/2019 Antonio Cota CNP  Last physical: Visit date not found Last MTM visit: Visit date not found   Next visit within 3 mo: Visit date not found  Next physical within 3 mo: Visit date not found  Prescriber OR PCP: Jeremías Manriquez MD  Last diagnosis associated with med order: 1. Hx of heart artery stent  - clopidogrel (PLAVIX) 75 mg tablet [Pharmacy Med Name: CLOPIDOGREL  75MG  TAB]; TAKE 1 TABLET BY MOUTH  DAILY. NO MORE REFILLS TILL SEEN.  Dispense: 90 tablet; Refill: 0    2. Hyperlipidemia  - atorvastatin (LIPITOR) 20 MG tablet [Pharmacy Med Name: ATORVASTATIN  20MG  TAB]; TAKE 1 TABLET BY MOUTH AT  BEDTIME  Dispense: 90 tablet; Refill: 0    3. Gastroesophageal reflux disease without esophagitis  - pantoprazole (PROTONIX) 40 MG tablet [Pharmacy Med Name: PANTOPRAZOLE SOD 40MG EC TABLET]; TAKE 1 TABLET BY MOUTH  DAILY  Dispense: 90 tablet; Refill: 0    4. Essential hypertension  - metoprolol succinate (TOPROL-XL) 100 MG 24 hr tablet [Pharmacy Med Name: METOPROLOL SUCC ER 100MG TABLET]; TAKE 1 TABLET BY MOUTH  DAILY  Dispense: 90 tablet; Refill: 1    If protocol passes may refill for 12 months if within 3 months of last provider visit (or a total of 15 months).             Passed - Blood pressure filed in past 12 months     BP Readings from Last 1 Encounters:   09/16/19 130/65

## 2021-06-01 NOTE — CONSULTS
[facility] Hematology and Oncology Consult Note    Patient: Christian Edwards  MRN: [id number]  Date of Service: 09/16/2019      Reason for Visit:    1.  Colon cancer    Assessment/Plan:    1.  Colon cancer: I reviewed with the patient is surgical pathology report.  He has node negative disease.  Mucinous histology.  No high risk features.  Stage II.  He also has microsatellite instability.  For these reasons no adjuvant chemotherapy is recommended.  I reviewed all these findings and recommendations with the patient.  We will continue now with surveillance.  We will repeat a CT scan in 6 months.  We can also check tumor marker at that time.  He has questions regarding the colon cancer answered.    2.  Pulmonary embolism: Seen on imaging from August 8.  Small burden involving the right upper lobe.  Malignancy associated.  Would recommend 6 months of anticoagulation.  He was not discharged from the hospital on Coumadin.  He has subsequently ran out of Lovenox about a week ago.  I prescribed Xarelto for him.  He does not need to be on indefinite anticoagulation since this was provoked from malignancy which has now been removed and he is not receiving any chemotherapy.  An order was also placed to have his IVC filter removed.    3.  Iron deficiency anemia: Secondary to blood loss from his cancer.  He was not discharged from the hospital on any iron.  I am going to order oral iron replacement for him today.  This will be taken for 6 months.  We will have him return to clinic in 2 months to recheck his counts.    4.  Pulmonary fibrosis: Seen on imaging.  I do not think he has had formal evaluation.  He is supposed to follow-up with Dr. Cisse as an outpatient.    ECOG Performance   ECOG Performance Status: 0    Distress Assessment  Distress Assessment Score: 2(visit today)    Problem List:    1. Other acute pulmonary embolism without acute cor pulmonale (H)  rivaroxaban (XARELTO) 15 mg (42)- 20 mg (9) dosepak    IR  Removal IVC Filter   2. Primary adenocarcinoma of ascending colon (H)       Staging History:    Cancer Staging  Primary adenocarcinoma of ascending colon (H)  Staging form: Colon And Rectum, AJCC 8th Edition  - Clinical: No stage assigned - Unsigned  - Pathologic stage from 9/16/2019: Stage I (pT2, pN0, cM0) - Signed by Bayron Sy MD on 9/16/2019    History:    Milton is a 64-year-old gentleman recently diagnosed with a colon cancer.  He initially presented to his primary care provider with a history of a positive Cologuard test.  Hemoglobin was checked on August 7 which showed a hemoglobin of 6 with a mean cell volume of 69 he was admitted to the hospital.  He had a CAT scan of the abdomen and pelvis done which showed a bulky soft tissue mass in the cecum measuring 7 x 6 x 5.5 cm.  There was also bleeding associated with it.  A small right upper lobe PE was also noted incidentally.  While in the hospital he was evaluated by pulmonary.  He had a coronary angiogram done with no interventions.  He ultimately was taken to the operating room on August 16.  He had a right hemicolectomy done.  Pathology showed mucinous adenocarcinoma.  32 lymph nodes were negative.  He was discharged from the hospital and is now back at home.  He is having 3 loose stools a day.  No significant abdominal pain.  No shortness of breath.    Past History:    Past Medical History:   Diagnosis Date     Abnormal CT of the abdomen 8/12/2019    Added automatically from request for surgery [id number]     Acute chest pain 8/12/2019     Acute Myocardial Infarction     Created by Conversion      Alcoholic Hepatitis     Created by Conversion      Angioedema     Created by Conversion      Benign Tubulovillous Adenoma Of The Large Intestine     Created by Conversion      Coronary Artery Disease     Created by Conversion [facility] Annotation: Jun 9 2008  3:15PM - Tammy Flowers: [place] May 2002      Cough     Created by Conversion      Diaphragmatic  "Paralysis     Created by Conversion [facility] Annotation: Dec 10 2009 11:10AM - Tammy Flowers: [email or web address] .  No clear etiology      DVT (deep venous thrombosis) (H)      Feeling Full Before Finishing Meals (Early Satiety)     Created by Conversion      Hyperlipidemia     Created by Conversion      Lactase Deficiency Syndrome     Created by Conversion      Lower Back Pain     Created by Conversion      PE (pulmonary thromboembolism) (H)      Pre-operative cardiovascular examination 6/3/2015     Rectal bleeding 2019     Sleep apnea     CPAP    Family History   Problem Relation Age of Onset     Sleep apnea Brother      COPD Mother      Heart disease Father      No Medical Problems Sister       [unfilled] Social History     Socioeconomic History     Marital status:      Spouse name: Not on file     Number of children: Not on file     Years of education: Not on file     Highest education level: Not on file   Occupational History     Not on file   Social Needs     Financial resource strain: Not on file     Food insecurity:     Worry: Not on file     Inability: Not on file     Transportation needs:     Medical: Not on file     Non-medical: Not on file   Tobacco Use     Smoking status: Former Smoker     Last attempt to quit: 2002     Years since quittin.7     Smokeless tobacco: Never Used   Substance and Sexual Activity     Alcohol use: Yes     Alcohol/week: 21.6 oz     Types: 36 Shots of liquor per week     Comment: drinks daily. 6-8 drinks per day/quit 19     Drug use: Yes     Types: Marijuana     Comment: \"once in a while\"     Sexual activity: Not Currently   Lifestyle     Physical activity:     Days per week: Not on file     Minutes per session: Not on file     Stress: Not on file   Relationships     Social connections:     Talks on phone: Not on file     Gets together: Not on file     Attends Taoist service: Not on file     Active member of club or organization: Not on file     Attends " meetings of clubs or organizations: Not on file     Relationship status: Not on file     Intimate partner violence:     Fear of current or ex partner: Not on file     Emotionally abused: Not on file     Physically abused: Not on file     Forced sexual activity: Not on file   Other Topics Concern     Not on file   Social History Narrative     Not on file        Allergies:    No Known Allergies    Review of Systems:    General  General (WDL): All general elements are within defined limits  ENT  ENT (WDL): All ENT elements are within defined limits  Respiratory  Respiratory (WDL): Exceptions to WDL  Dyspnea: Yes - Chronic (Greater than 3 months)(paralyzed diaphragm)  hemoptysis: None  Is patient on O2?: None  Cough: None  Non-Cardiac Chest Pain: None  Cardiovascular  Cardiovascular (WDL): All cardiovascular elements are within defined limits  Endocrine  Endocrine (WDL): All endocrine elements are within defined limits  Gastrointestinal  Gastrointestinal (WDL): All gastrointestinal elements are within defined limits  Musculoskeletal  Musculoskeletal (WDL): Exceptions to WDL  Range of Motion Limitation: None  Joint pain: None  Back Pain: Yes - Chronic (Greater than 3 months)  Activity Assistance: None  Difficulty to lie flat for more than 30 minutes: None  Pain interfering with walking: None  Muscle pain or stiffness: None  Recent fall: None  Assistive device: None  Neurological  Neurological (WDL): All neurological elements are within defined limits  Dominant Hand: Right  Psychological/Emotional  Psychological/Emotional (WDL): Exceptions to WDL  Depression: None  Insomnia: Yes - Chronic (Greater than 3 months)  Panic attacks: None  Anxiety: None  Daytime sleepiness: None  Hallucinations: None  Psychosocial needs or not coping well: None  Hematological/Lymphatic  Hematological/Lymphatic (WDL): All hematological/lymphatic elements are within defined limits  Dermatological  Dermatologic (WDL): All dermatological elements  "are within defined limits  Genitourinary/Reproductive  Genitourinary/Reproductive (WDL): All genitourinary/reproductive elements are within defined limits  Reproductive (Females only)     Pain  Currently in Pain: Yes  Pain Score (Initial OR Reassessment): 3  Pain Frequency: Constant/continuous  Location: lower back & legs  Pain Characteristics : Sore(\"just hurts\")  Pain Intervention(s): Home medication  Response to Interventions: little relief    Physical Exam:    Recent Vitals 9/16/2019   Height 5' 5.75\"   Weight 166 lbs 5 oz   BSA (m2) 1.87 m2   /65   Pulse 80   Temp 98.5   Temp src 1   SpO2 98   Some recent data might be hidden     General: patient appears stated age of 64 y.o.. Nontoxic and in no distress.   HEENT: Head: atraumatic, normocephalic. Sclerae anicteric.  Chest:  Normal respiratory effort  Cardiac:  No edema.   Abdomen: abdomen is non-distended  Extremities: normal tone and muscle bulk.  Skin: no lesions or rash. Warm and dry.   CNS: alert and oriented. Grossly non-focal.   Psychiatric: normal mood and affect.     Lab Results:    Results for NORMA LOZA (MRN [id number]) as of 9/16/2019 13:03   Ref. Range 8/30/2019 11:48   Sodium Latest Ref Range: 136 - 145 mmol/L 139   Potassium Latest Ref Range: 3.5 - 5.0 mmol/L 4.6   Chloride Latest Ref Range: 98 - 107 mmol/L 104   CO2 Latest Ref Range: 22 - 31 mmol/L 25   Anion Gap, Calculation Latest Ref Range: 5 - 18 mmol/L 10   BUN Latest Ref Range: 8 - 22 mg/dL 7 (L)   Creatinine Latest Ref Range: 0.70 - 1.30 mg/dL 0.74   GFR MDRD Af Amer Latest Ref Range: >60 mL/min/1.73m2 >60   GFR MDRD Non Af Amer Latest Ref Range: >60 mL/min/1.73m2 >60   Calcium Latest Ref Range: 8.5 - 10.5 mg/dL 9.5   AST Latest Ref Range: 0 - 40 U/L 31   ALT Latest Ref Range: 0 - 45 U/L 31   ALBUMIN Latest Ref Range: 3.5 - 5.0 g/dL 3.5   Protein, Total Latest Ref Range: 6.0 - 8.0 g/dL 6.5   Alkaline Phosphatase Latest Ref Range: 45 - 120 U/L 274 (H)   Bilirubin, Total Latest " Ref Range: 0.0 - 1.0 mg/dL 0.4   Glucose Latest Ref Range: 70 - 125 mg/dL 99   WBC Latest Ref Range: 4.0 - 11.0 thou/uL 6.8   RBC Latest Ref Range: 4.40 - 6.20 mill/uL 3.68 (L)   Hemoglobin Latest Ref Range: 14.0 - 18.0 g/dL 8.0 (L)   Hematocrit Latest Ref Range: 40.0 - 54.0 % 25.4 (L)   MCV Latest Ref Range: 80 - 100 fL 69 (L)   MCH Latest Ref Range: 27.0 - 34.0 pg 21.7 (L)   MCHC Latest Ref Range: 32.0 - 36.0 g/dL 31.4 (L)   RDW Latest Ref Range: 11.0 - 14.5 % 22.0 (H)   Platelets Latest Ref Range: 140 - 440 thou/uL 477 (H)   MPV Latest Ref Range: 7.0 - 10.0 fL 7.0   Neutrophils % Latest Ref Range: 50 - 70 % 51   Lymphocytes % Latest Ref Range: 20 - 40 % 33   Monocytes % Latest Ref Range: 2 - 10 % 10   Eosinophils % Latest Ref Range: 0 - 6 % 6   Basophils % Latest Ref Range: 0 - 2 % 0   Neutrophils Absolute Latest Ref Range: 2.0 - 7.7 thou/uL 3.5   Lymphocytes Absolute Latest Ref Range: 0.8 - 4.4 thou/uL 2.3   Monocytes Absolute Latest Ref Range: 0.0 - 0.9 thou/uL 0.7   Eosinophils Absolute Latest Ref Range: 0.0 - 0.4 thou/uL 0.4   Basophils Absolute Latest Ref Range: 0.0 - 0.2 thou/uL 0.0     Imaging Results:    Xr Chest 1 View Portable    Result Date: 8/21/2019  EXAM: XR CHEST 1 VIEW PORTABLE LOCATION: [facility] DATE/TIME: 8/21/2019 11:56 AM INDICATION: shortness of breath COMPARISON: 8/7/2019 FINDINGS: New focal airspace consolidation at left lung base most suggestive of left lower lobe pneumonia. Left upper lobe and right lung remain clear. Heart size normal. Right PICC line in good position.    Pathology:    Final Diagnosis   RIGHT HEMICOLECTOMY SPECIMEN CONTAINING INVASIVE NEOPLASM:     - SEE FULL SYNOPTIC REPORT BELOW     - SUMMARY OF SYNOPSIS:         - MUCINOUS ADENOCARCINOMA         - TUMOR IS GRADE 2 OF 4 (MODERATELY DIFFERENTIATED)         - TUMOR IS 11 CM IN GREATEST DIMENSION         - TUMOR INVADES MUSCULARIS PROPRIA, BUT DOES NOT INVADE THROUGH SEROSA INTO            PERICOLIC TISSUES          - NEGATIVE FOR TUMOR DEPOSITS, TUMOR PERFORATION, LYMPHOVASCULAR INVASION, OR            PERINEURAL INVASION         - REMNANT OF VILLOUS ADENOMA PRESENT         - ALL SURGICAL MARGINS FREE OF TUMOR; CLOSEST MARGIN IS PROXIMAL MARGIN, 2.5 CM            FROM TUMOR         - 32 REGIONAL LYMPH NODES NEGATIVE FOR METASTATIC TUMOR (0/32 LYMPH NODES POSITIVE)         - ATROPHIC VERMIFORM APPENDIX PRESENT         - TUMOR IS STAGE pT2 AND pN0        Signed by: Bayron Sy MD

## 2021-06-01 NOTE — PROGRESS NOTES
Pulmonary Note  10/2/2019      CCx: Follow-up Restrictive ventilatory dysfunction, spontaneous diaphragm paralysis      History:     Patient is a pleasant 59-year-old male with a past medical history significant for an MI, alcoholic hepatitis, tubulovillous adenoma of the large intestine and hyperlipidemia who was originally referred by Dr. Jeronimo from the sleep clinic for further evaluation of ventilatory insufficiency.  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  He has not seen me in over 2 years and at the time I had referred him for consideration of diaphragmatic plication due to ongoing symptoms.  Patient had declined this intervention and has not been seen by myself since then.  He was recently admitted to the hospital with a bowel obstruction and subsequently underwent right hemicolectomy which revealed moderately well-differentiated mucinous adenocarcinoma.  He was also noted to have vomiting embolism during that same visit and an echocardiogram revealed an ejection fraction of 47% with distal anterior, anteroseptal and apical hypokinesis.  His LAD, left circumflex and RCA stents were all noted to be patent in view of his active bleeding and negative troponins the plan was to medically optimize the patient for his upcoming hemicolectomy.  Since his discharge he thinks that his breathing is at his baseline and he continues to be compliant with his AVAPS device.  He sometimes feels like he is unable to take a deep breath but then this sensation passes.    Medical summary:  He mentions being diagnosed with diaphragm paralysis in 2004.  He denies any trauma or preceding events that he can recall that correlate with his diagnosis.  He mentions undergoing  fairly extensive workup with prior sniff test that showed paralysis on the right.  Subsequent imaging included CT of the chest that showed right middle lobe atelectasis but no obstructing lesion or no other parenchymal abnormality.  He additionally has undergone MRI of the brain and neck with no lesions.  Additionally underwent abdominal CT and MRI failed to demonstrate any pathology other than upper protrusion of his liver into his hemithorax.       PMH:  Reviewed in epic and unchanged from prior visit.    Past Surgical History:   Procedure Laterality Date     CARDIAC SURGERY      five stents     COLECTOMY N/A 8/19/2019    Procedure: RIGHT COLECTOMY, OPEN AND UMBILICAL HERNIA REPAIR;  Surgeon: Epifanio Black MD;  Location: [facility] OR;  Service: General     COLONOSCOPY N/A 8/13/2019    Procedure: COLONOSCOPY WITH BIOPSY;  Surgeon: Antonio Rajput MD;  Location: [facility] OR;  Service: Gastroenterology     CV CORONARY ANGIOGRAM N/A 8/12/2019    Procedure: Coronary Angiogram;  Surgeon: Eddie Rocha MD;  Location: [facility] Cath Lab;  Service: Cardiology     CV LEFT HEART CATHETERIZATION WO LEFT VETRICULOGRAM Left 8/12/2019    Procedure: Left Heart Catheterization Without Left Ventriculogram;  Surgeon: Eddie Rocha MD;  Location: [facility] Cath Lab;  Service: Cardiology     IR IVC FILTER PLACEMENT  8/9/2019     PICC AND MIDLINE TEAM LINE INSERTION  8/12/2019          stemi       Social History     Socioeconomic History     Marital status:      Spouse name: Not on file     Number of children: Not on file     Years of education: Not on file     Highest education level: Not on file   Occupational History     Not on file   Social Needs     Financial resource strain: Not on file     Food insecurity:     Worry: Not on file     Inability: Not on file     Transportation needs:     Medical: Not on file     Non-medical: Not on file   Tobacco Use     Smoking status: Former Smoker  "    Last attempt to quit: 2002     Years since quittin.7     Smokeless tobacco: Never Used   Substance and Sexual Activity     Alcohol use: Yes     Alcohol/week: 36.0 standard drinks     Types: 36 Shots of liquor per week     Comment: drinks daily. 6-8 drinks per day/quit 20     Drug use: Yes     Types: Marijuana     Comment: \"once in a while\"     Sexual activity: Not Currently   Lifestyle     Physical activity:     Days per week: Not on file     Minutes per session: Not on file     Stress: Not on file   Relationships     Social connections:     Talks on phone: Not on file     Gets together: Not on file     Attends [employer] service: Not on file     Active member of club or organization: Not on file     Attends meetings of clubs or organizations: Not on file     Relationship status: Not on file     Intimate partner violence:     Fear of current or ex partner: Not on file     Emotionally abused: Not on file     Physically abused: Not on file     Forced sexual activity: Not on file   Other Topics Concern     Not on file   Social History Narrative     Not on file     Family History   Problem Relation Age of Onset     Sleep apnea Brother      COPD Mother      Heart disease Father      No Medical Problems Sister      No Known Allergies    Review of Systems - 12 point ROS negative        Medications:     Current Outpatient Medications on File Prior to Visit   Medication Sig Dispense Refill     acetaminophen (TYLENOL) 500 MG tablet Take 1-2 tablets (500-1,000 mg total) by mouth every 4 (four) hours as needed.  0     aspirin 81 MG EC tablet Take 1 tablet (81 mg total) by mouth daily.  0     atorvastatin (LIPITOR) 20 MG tablet TAKE 1 TABLET BY MOUTH AT  BEDTIME 90 tablet 3     atorvastatin (LIPITOR) 80 MG tablet Take 1 tablet (80 mg total) by mouth at bedtime. 30 tablet 0     clopidogrel (PLAVIX) 75 mg tablet TAKE 1 TABLET BY MOUTH  DAILY. NO MORE REFILLS TILL SEEN. 90 tablet 0     codeine 30 MG tablet Take 1 " tablet (30 mg total) by mouth every 8 (eight) hours as needed for pain. 90 tablet 0     ferrous sulfate 325 (65 FE) MG tablet Take 1 tablet (325 mg total) by mouth daily with breakfast. Take with orange juice. 90 tablet 1     isosorbide dinitrate (ISORDIL) 10 MG tablet Take 1 tablet (10 mg total) by mouth 3 (three) times a day at 8:00 am, 12:00 noon and 6:00 pm. 270 tablet 1     metoprolol succinate (TOPROL-XL) 100 MG 24 hr tablet TAKE 1 TABLET BY MOUTH  DAILY 90 tablet 3     multivitamin (MULTIVITAMIN) per tablet Take 1 tablet by mouth daily.       nitroglycerin (NITROSTAT) 0.4 MG SL tablet Place 0.4 mg under the tongue every 5 (five) minutes as needed.              omeprazole (PRILOSEC) 40 MG capsule TAKE 1 CAPSULE BY MOUTH  DAILY 90 capsule 0     rivaroxaban (XARELTO) 15 mg (42)- 20 mg (9) dosepak Take 1 tablet (15mg) po bid for 21 days then take 1 tablet (20mg) daily 51 tablet 0     [DISCONTINUED] pantoprazole (PROTONIX) 40 MG tablet TAKE 1 TABLET BY MOUTH  DAILY 90 tablet 0     [DISCONTINUED] hydroCHLOROthiazide (HYDRODIURIL) 25 MG tablet TAKE 1 TABLET BY MOUTH  DAILY 90 tablet 0     No current facility-administered medications on file prior to visit.            Exam/Data:   Vitals  Vitals:    10/02/19 1349   BP: 112/64   Pulse: 76   Resp: 20   SpO2: 97%       EXAM:  Physical Exam  Vitals signs reviewed.   Constitutional:       General: He is not in acute distress.     Appearance: He is well-developed. He is not diaphoretic.   HENT:      Head: Normocephalic and atraumatic.      Nose: Nose normal.      Mouth/Throat:      Pharynx: No oropharyngeal exudate.   Eyes:      Conjunctiva/sclera: Conjunctivae normal.      Pupils: Pupils are equal, round, and reactive to light.   Neck:      Musculoskeletal: Normal range of motion and neck supple.      Thyroid: No thyromegaly.      Vascular: No JVD.      Trachea: No tracheal deviation.   Cardiovascular:      Rate and Rhythm: Normal rate and regular rhythm.      Heart  sounds: Normal heart sounds. No murmur. No friction rub.   Pulmonary:      Effort: Pulmonary effort is normal. No respiratory distress.      Breath sounds: Normal breath sounds. No wheezing or rales.   Chest:      Chest wall: No tenderness.   Abdominal:      General: Bowel sounds are normal. There is no distension.      Palpations: Abdomen is soft.      Tenderness: There is no tenderness.   Musculoskeletal: Normal range of motion.         General: No tenderness.   Skin:     General: Skin is warm and dry.      Coloration: Skin is not pale.      Findings: No erythema.   Neurological:      Mental Status: He is alert and oriented to person, place, and time.      Cranial Nerves: No cranial nerve deficit.      Sensory: No sensory deficit.      Motor: No atrophy or abnormal muscle tone.      Deep Tendon Reflexes:      Reflex Scores:       Tricep reflexes are 2+ on the right side and 2+ on the left side.       Bicep reflexes are 2+ on the right side and 2+ on the left side.       Patellar reflexes are 2+ on the right side and 2+ on the left side.  Psychiatric:         Thought Content: Thought content normal.           DATA:  Coronary Angiogram 8/12/19:  LM:no obstruction   LAD:Ca2+, patent proximal and distal stents, mid-vessel at take off of a D2 has Ca2+ ~60% narrowing but AVELINO 3 flow  Lcx:patent proximal and OM3 stents, but a small inferior subdivision of an OM3 appears occluded consistent w/  w/ faint L-L collaterals  RCA:patent proximal and mid-vessel stents, diffuse Ca2+, Ca2+ 90% PLV narrowing but AVELINO 3 flow     LVEDP:24    TTE 8/12/19:    When compared to the previous study dated 8/8/2019, anterior and apical hypokinesis is now seen    Left ventricle ejection fraction is mildly decreased. The calculated left ventricular ejection fraction is 47%.    Mild to distal anterior, anteroseptal and apical hypokinesis    Normal right ventricular size and systolic function.    No hemodynamically significant valvular heart  abnormalities.     CT Chest with contrast 8/8/19:  1.  Findings positive for small burden of pulmonary emboli limited to the right upper lobe. Case discussed with the patient's nurse Sharon at 1139 hours.  2.  No evidence for metastatic disease involving thorax.  3.  Increasing interstitial interstitial prominence may have some component of acute edema though likely relates to progression of fibrosis.    (Unchanged from previous visit)  PFT's 5/17/17:  FEV1/FVC is 82% and is normal.  FEV1 is 2.35L (75%) predicted and is normal.  FVC is 2.85L (70%) predicted and normal.  There was no improvement in spirometry after a single inhaled dose of bronchodilator.  TLC is 4.98L (78%) predicted and is normal.  RV is 2.30L (99%) predicted and is normal.  DLCO is 19.92ml/min/hg (74%) predicted and is normal when it is corrected for hemoglobin.  Flow volume loops indicate no significant abnormalities.  MEP 79  MIP -48    Impression:  Full Pulmonary Function Test is abnormal.  PFTs are consistent with no obstructive disease.  Spirometry is not consistent with reversibility.  There is no hyperinflation.  There is no air-trapping.  Diffusion capacity when corrected for hemoglobin is normal.   MIP and MEP both reduced and in particular the MIP is significantly reduced compared to the one from last year.    PFT's 5/11/16:  MEP 83  MIP -66    PFT's 5/2/16:  FEV1/FVC is 79% and is normal.  FEV1 is 2.3L (72%) predicted and is normal.  FVC is 2.92L (72%) predicted and normal.  There was no improvement in spirometry after a single inhaled dose of bronchodilator.  TLC is 4.27L (67%) predicted and is reduced.  RV is 1.7L (74%) predicted and is reduced.  DLCO is 22.38ml/min/hg (83%) predicted and is normal when it is corrected for hemoglobin.  Flow volume loops indicate no significant abnormalities.    Impression:  Full Pulmonary Function Test is abnormal.  PFTs are consistent with no evidence of restrictive or obstructive disease.  Spirometry  is not consistent with reversibility.  There is no hyperinflation.  There is no air-trapping.  There is moderate restriction noted on plethysmography which is similar to his values last year.  Diffusion capacity when corrected for hemoglobin is normal.       PFT's 4/27/15:  FEV1/FVC is 78% and is normal.   FEV1 is 2.29L (69%) predicted and is reduced.   FVC is 2.94L (68%) predicted and reduced.   There was no improvement in spirometry after a single inhaled dose of bronchodilator.   TLC is 4.06L (63%) predicted and is reduced.   RV is 1.35L (65%) predicted and is reduced.   DLCO is 21.14ml/min/hg (74%) predicted and is reduced when it is corrected for hemoglobin.   Flow volume loops indicate questionable variable intrathoracic obstruction with a flattened inspiratory loop.     Impression: Full Pulmonary Function Test is abnormal. PFTs are consistent with mild restrictivedisease.   Spirometry is not consistent with reversibility.   There is no hyperinflation.   There is no air-trapping.   Plethysmography indicates diminished lung volumes.   Diffusion capacity when corrected for hemoglobin is mildly reduced.   Flow volume loops concerning for variable intrathoracic obstruction, correlation with neck CT would be advisable.   MIP -16 (sitting) -44 (supine)  MEP 84 (sitting) 18 (supine)      HRCT Chest with inspiratory and expiratory cuts 5/26/15 (unchanged from prior visit):  1. Mild peripheral septal thickening without honeycombing. Findings compatible with nonspecific fibrosis.  2. No evidence of bronchiectasis nor tracheomalacia.  3. Coronary atherosclerosis.  4. Eccentric thickening gastric cardia adjacent to GE junction of uncertain significance with prominent adjacent to gastrohepatic lymph nodes. Consider upper endoscopy to further assess.    XR Sniff Test 4/24/15:  Right hemidiaphragm is elevated relative to the left.   Patient was observed in both the AP and lateral projections. Both diaphragms move normally  during the sniff exam but the right does not move as far inferiorly as the left does.    Assessment/Plan:   Christian Edwards is a 64 y.o. male who presents for a follow-up visit after recent discharge after being diagnosed with a pulmonary embolism and a colonic adenocarcinoma status post right hemicolectomy.  He continues to be short of breath although does not feel that this is progressed much since his last clinic visit with me several years ago related to a paralyzed right hemidiaphragm.     1. We will repeat PFTs including MIP, MEP and MVV and get back to him with the results of these.  2. Continue IVAPS therapy regarding his sleep disordered breathing.  3. I do not believe that his interstitial lung disease is progressed given that he was subsequently noted to have an elevated EDP of 24 mmHg which would appear to be more consistent with a diagnosis of volume overload.      Katlin Tomlinsonvi  Pulmonary and Critical Care  [id number]

## 2021-06-02 ENCOUNTER — RECORDS - HEALTHEAST (OUTPATIENT)
Dept: ADMINISTRATIVE | Facility: CLINIC | Age: 66
End: 2021-06-02

## 2021-06-02 VITALS — BODY MASS INDEX: 29.05 KG/M2 | WEIGHT: 180 LBS

## 2021-06-02 VITALS — BODY MASS INDEX: 29.21 KG/M2 | WEIGHT: 181 LBS

## 2021-06-02 VITALS — WEIGHT: 181 LBS | BODY MASS INDEX: 29.21 KG/M2

## 2021-06-02 RX ORDER — POTASSIUM CHLORIDE 1500 MG/1
20 TABLET, EXTENDED RELEASE ORAL EVERY OTHER DAY
Qty: 45 TABLET | Refills: 0 | Status: SHIPPED | OUTPATIENT
Start: 2021-06-02 | End: 2021-08-06

## 2021-06-02 NOTE — TELEPHONE ENCOUNTER
Controlled Substance Refill Request  Medication Name:   Requested Prescriptions     Pending Prescriptions Disp Refills     codeine 30 MG tablet 90 tablet 0     Sig: Take 1 tablet (30 mg total) by mouth every 8 (eight) hours as needed for pain.     Date Last Fill: 9/16/19  Pharmacy: [facility]      Submit electronically to pharmacy  Controlled Substance Agreement Date Scanned:   Encounter-Level CSA Scan Date:    There are no encounter-level csa scan date.       Last office visit with prescriber/PCP: 8/7/2019 Jeremías Manriquez MD OR same dept: 8/7/2019 Jeremías Manriquez MD OR same specialty: 8/30/2019 Antonio Cota CNP  Last physical: Visit date not found Last MTM visit: Visit date not found

## 2021-06-02 NOTE — PRE-PROCEDURE
Procedure Name: IVC filter removal   Date/Time: 10/4/2019 9:29 AM  Written consent obtained?: Yes  Risks and benefits: Risks, benefits and alternatives were discussed  Consent given by: patient  Expected level of sedation: moderate  ASA Class: Class 3- Severe systemic disease, definite functional limitations  Mallampati: Grade 2- soft palate, base of uvula, tonsillar pillars, and portion of posterior pharyngeal wall visible  Patient states understanding of procedure being performed: Yes  Patient's understanding of procedure matches consent: Yes  Appropriately NPO: yes  Lungs: lungs clear with good breath sounds bilaterally  Heart: normal heart sounds and rate  History & Physical reviewed: Full history and physical done prior to deep sedation  Statement of review: I have reviewed the lab findings, diagnostic data, medications, and the plan for sedation

## 2021-06-02 NOTE — TELEPHONE ENCOUNTER
RN cannot approve Refill Request    RN can NOT refill this medication med is not covered by policy/route to provider. Last office visit: 8/7/2019 Jeremías Manriquez MD Last Physical: Visit date not found Last MTM visit: Visit date not found Last visit same specialty: 8/30/2019 Antonio Cota CNP.  Next visit within 3 mo: Visit date not found  Next physical within 3 mo: Visit date not found      Gayatri Barajas, [facility] Triage/Med Refill 10/10/2019    Requested Prescriptions   Pending Prescriptions Disp Refills     rivaroxaban (XARELTO) 15 mg (42)- 20 mg (9) dosepak 51 tablet 0     Sig: Take 1 tablet (15mg) po bid for 21 days then take 1 tablet (20mg) daily       Apixaban/Rivaroxaban/Dabigatran Refill Protocol Failed - 10/10/2019  2:32 PM        Failed - Route to appropriate pool/provider     Last Anticoagulation Summary:           Passed - Renal function test in last year     Creatinine   Date Value Ref Range Status   10/04/2019 0.74 0.70 - 1.30 mg/dL Final             Passed - PCP or prescribing provider visit in past 12 months       Last office visit with prescriber/PCP: 8/7/2019 Jeremías Manriquez MD OR same dept: 8/7/2019 Jeremías Manriquez MD OR same specialty: 8/30/2019 Antonio Cota CNP  Last physical: Visit date not found Last MTM visit: Visit date not found   Next visit within 3 mo: Visit date not found  Next physical within 3 mo: Visit date not found  Prescriber OR PCP: Jeremías Manriquez MD  Last diagnosis associated with med order: 1. Other acute pulmonary embolism without acute cor pulmonale (H)  - rivaroxaban (XARELTO) 15 mg (42)- 20 mg (9) dosepak; Take 1 tablet (15mg) po bid for 21 days then take 1 tablet (20mg) daily  Dispense: 51 tablet; Refill: 0    If protocol passes may refill for 12 months if within 3 months of last provider visit (or a total of 15 months).

## 2021-06-02 NOTE — PROGRESS NOTES
RESPIRATORY CARE NOTE     Patient Name: Christian Edwards  Today's Date: 10/14/2019     Problem List  Patient Active Problem List   Diagnosis     Diaphragmatic Paralysis     Alcoholic Hepatitis     Alcohol abuse     NSTEMI (non-ST elevated myocardial infarction) (H)     Insomnia     Hyperlipidemia     Coronary artery disease due to calcified coronary lesion     Lumbar Disc Degeneration     Lower Back Pain     Abnormal Glucose     Cough     Benign Tubulovillous Adenoma Of The Large Intestine     Lactase Deficiency Syndrome     Obstructive sleep apnea     Hypoventilation     H/O non-ST elevation myocardial infarction (NSTEMI)     Hx of heart artery stent     Anemia     Coronary artery disease involving native coronary artery of native heart without angina pectoris     Dyspnea on exertion     Essential hypertension     Dyslipidemia     Pulmonary fibrosis (H)     Other acute pulmonary embolism without acute cor pulmonale (H)     Deep vein thrombosis (DVT) of popliteal vein of left lower extremity, unspecified chronicity (H)     Hypomagnesemia     Unstable angina (H)     Primary adenocarcinoma of ascending colon (H)               PFT NOTE    Pt gave good effort & was cooperative, was able to meet ATS standards for acceptability and repeatability. albuterol was given for the bronchodilator. Patient left in no distress.    Jessica Angel, LRT                Jessica Angel

## 2021-06-02 NOTE — TELEPHONE ENCOUNTER
Refill request for medication: codeine 30 MG tablet  Last visit addressing this medication: 08/07/2019  Follow up plan Return in about 3 months (around 11/7/2019),     Last refill on 9/16/2019, quantity #90   CSA completed 08/07/2019   checked  10/16/19, last dispensed refill 09/16/2019    Appointment: Not due     Sherry Ford [facility]

## 2021-06-02 NOTE — PROGRESS NOTES
Patient left discharge paperwork. Called him and informed the patient nursing can mail paperwork. Patient stated he does not need paperwork mailed. Verbally reviewed the paperwork with the patient and he understood the instructions. Patient voices no needs.

## 2021-06-02 NOTE — PROCEDURES
[facility]    Procedure: IR Procedure Note  Date/Time: 10/4/2019 10:12 AM  Performed by: Eric Neville MD  Authorized by: Eric Neville MD       Universal Protocol    Site marked: Yes    Prior images obtained and reviewed: Yes    Required items: required blood products, implants, devices, and special equipment available    Patient identity confirmed: verbally with patient    Reevaluation: Patient was reevaluated immediately before administering moderate or deep sedation or anesthesia    Confirmation checklist: patient's identity using two indicators, relevant allergies, procedure was appropriate and matched the consent or emergent situation and correct equipment/implants were available    Time out: Immediately prior to procedure a time out was called to verify the correct patient, procedure, equipment, support staff and site/side marked as required    Universal Protocol: Joint Commission Universal Protocol was followed    Preparation: Patient was prepped and draped in the usual sterile fashion    ESBL (mL): 4    Anesthesia    Local anesthesia used?: Yes    Local anesthetic: lidocaine 1% without epinephrine    Anesthetic total (mL): 10    Sedation    Patient sedation: Yes    Sedation type: moderate (conscious) sedation    Vital signs: Vital signs monitored during sedation  Specimens: none  Complications: None  Condition: Stable  Plan: Successful removal of IVC filter.    Post-procedure    Patient tolerance: Patient tolerated the procedure well with no immediate complications   Length of time physician present for 1:1 monitoring during sedation: 30

## 2021-06-03 ENCOUNTER — OFFICE VISIT - HEALTHEAST (OUTPATIENT)
Dept: FAMILY MEDICINE | Facility: CLINIC | Age: 66
End: 2021-06-03

## 2021-06-03 VITALS
BODY MASS INDEX: 27.86 KG/M2 | RESPIRATION RATE: 20 BRPM | OXYGEN SATURATION: 97 % | DIASTOLIC BLOOD PRESSURE: 64 MMHG | HEART RATE: 76 BPM | SYSTOLIC BLOOD PRESSURE: 112 MMHG | WEIGHT: 171.3 LBS

## 2021-06-03 VITALS — BODY MASS INDEX: 26.23 KG/M2 | WEIGHT: 172.5 LBS

## 2021-06-03 VITALS — WEIGHT: 171 LBS | HEIGHT: 66 IN | BODY MASS INDEX: 27.48 KG/M2

## 2021-06-03 VITALS
RESPIRATION RATE: 16 BRPM | SYSTOLIC BLOOD PRESSURE: 144 MMHG | HEIGHT: 66 IN | DIASTOLIC BLOOD PRESSURE: 74 MMHG | WEIGHT: 181 LBS | BODY MASS INDEX: 29.09 KG/M2 | HEART RATE: 68 BPM

## 2021-06-03 VITALS
TEMPERATURE: 98.1 F | WEIGHT: 180.8 LBS | HEART RATE: 79 BPM | BODY MASS INDEX: 29.4 KG/M2 | OXYGEN SATURATION: 93 % | DIASTOLIC BLOOD PRESSURE: 64 MMHG | SYSTOLIC BLOOD PRESSURE: 139 MMHG

## 2021-06-03 VITALS
WEIGHT: 153 LBS | HEIGHT: 68 IN | BODY MASS INDEX: 23.19 KG/M2 | WEIGHT: 153 LBS | BODY MASS INDEX: 23.19 KG/M2 | HEIGHT: 68 IN | HEIGHT: 68 IN | WEIGHT: 153 LBS | BODY MASS INDEX: 23.19 KG/M2

## 2021-06-03 VITALS — HEIGHT: 68 IN | BODY MASS INDEX: 27.7 KG/M2 | BODY MASS INDEX: 26.3 KG/M2 | WEIGHT: 171.6 LBS

## 2021-06-03 VITALS
BODY MASS INDEX: 26.73 KG/M2 | OXYGEN SATURATION: 98 % | HEART RATE: 80 BPM | SYSTOLIC BLOOD PRESSURE: 130 MMHG | WEIGHT: 166.3 LBS | TEMPERATURE: 98.5 F | DIASTOLIC BLOOD PRESSURE: 65 MMHG | HEIGHT: 66 IN

## 2021-06-03 VITALS — BODY MASS INDEX: 26.61 KG/M2 | WEIGHT: 175 LBS

## 2021-06-03 VITALS — BODY MASS INDEX: 24.1 KG/M2 | WEIGHT: 159 LBS | HEIGHT: 68 IN

## 2021-06-03 VITALS — BODY MASS INDEX: 26.21 KG/M2 | WEIGHT: 172.4 LBS

## 2021-06-03 VITALS — BODY MASS INDEX: 27.92 KG/M2 | WEIGHT: 173 LBS

## 2021-06-03 DIAGNOSIS — R79.89 ELEVATED D-DIMER: ICD-10-CM

## 2021-06-03 DIAGNOSIS — I25.2 HISTORY OF NON-ST ELEVATION MYOCARDIAL INFARCTION (NSTEMI): ICD-10-CM

## 2021-06-03 DIAGNOSIS — E87.6 HYPOKALEMIA: ICD-10-CM

## 2021-06-03 DIAGNOSIS — R06.02 SHORTNESS OF BREATH: ICD-10-CM

## 2021-06-03 DIAGNOSIS — F10.20 ALCOHOL DEPENDENCE, UNCOMPLICATED (H): ICD-10-CM

## 2021-06-03 DIAGNOSIS — Z86.718 PERSONAL HISTORY OF DVT (DEEP VEIN THROMBOSIS): ICD-10-CM

## 2021-06-03 DIAGNOSIS — R07.1 CHEST PAIN ON BREATHING: ICD-10-CM

## 2021-06-03 DIAGNOSIS — J84.10 PULMONARY FIBROSIS (H): ICD-10-CM

## 2021-06-03 DIAGNOSIS — I10 BENIGN ESSENTIAL HYPERTENSION: ICD-10-CM

## 2021-06-03 DIAGNOSIS — I42.9 CARDIOMYOPATHY, UNSPECIFIED TYPE (H): ICD-10-CM

## 2021-06-03 DIAGNOSIS — R07.9 ACUTE CHEST PAIN: ICD-10-CM

## 2021-06-03 LAB
D DIMER PPP FEU-MCNC: 0.78 FEU UG/ML
POTASSIUM BLD-SCNC: 3.5 MMOL/L (ref 3.5–5)

## 2021-06-03 RX ORDER — AMLODIPINE BESYLATE 10 MG/1
10 TABLET ORAL DAILY
Qty: 90 TABLET | Refills: 0 | Status: SHIPPED | OUTPATIENT
Start: 2021-06-03 | End: 2021-08-10

## 2021-06-03 ASSESSMENT — MIFFLIN-ST. JEOR: SCORE: 1565.11

## 2021-06-03 NOTE — TELEPHONE ENCOUNTER
Reviewed CEA level of 3.3 from yesterday with Dr. Sy.  No concerns at this time. Will recheck in 4 months, as discussed at his appointment yesterday.  Called patient with this information. He verbalized understanding and thanked me for calling/CLEMENT Dent RN

## 2021-06-03 NOTE — TELEPHONE ENCOUNTER
Controlled Substance Refill Request  Medication Name:   Requested Prescriptions     Pending Prescriptions Disp Refills     codeine 30 MG tablet 90 tablet 0     Sig: Take 1 tablet (30 mg total) by mouth every 8 (eight) hours as needed for pain.     Date Last Fill: 10/16/19  Pharmacy: Dr Manriquez      Submit electronically to pharmacy  Controlled Substance Agreement Date Scanned:   Encounter-Level CSA Scan Date:    There are no encounter-level csa scan date.       Last office visit with prescriber/PCP: 8/7/2019 Jeremías Manriquze MD OR same dept: 8/7/2019 Jeremías Manriquez MD OR same specialty: 8/30/2019 Antonio Cota CNP  Last physical: Visit date not found Last MTM visit: Visit date not found

## 2021-06-03 NOTE — TELEPHONE ENCOUNTER
Refill request for medication: codeine 30 MG tablet  Last visit addressing this medication: 08/07/2019  Follow up plan Return in about 3 months (around 11/7/2019)   Last refill on 10/16/2019, quantity #90   CSA completed 08/07/2019   checked  11/14/19, last dispensed refill 10/16/2019    Appointment: No appointments scheduled at this time     Sherry Ford, CMA

## 2021-06-03 NOTE — PROGRESS NOTES
[facility] Hematology and Oncology Progress Note    Patient: Christian Edwards  MRN: [id number]  Date of Service: 11/25/2019      Assessment and Plan:    Cancer Staging  Primary adenocarcinoma of ascending colon (H)  Staging form: Colon And Rectum, AJCC 8th Edition  - Clinical: No stage assigned - Unsigned  - Pathologic stage from 9/16/2019: Stage I (pT2, pN0, cM0) - Signed by Bayron Sy MD on 9/16/2019    1.  Colon cancer: 3 months out now from surgery.  We will repeat imaging in 4 months.  If that looks good then we will likely switch to yearly scans given his early stage disease.  Still having some problems with frequent loose stools.  Asked him to try some Lomotil and some fiber.  Tumor marker from today is pending.     2.  Pulmonary embolism: He is taking Xarelto.  He will stop and February, 2020.  Small tumor burden.  Malignancy related.  His IVC filter has been removed.    3.  Iron deficiency anemia: Hemoglobin and mean cell volume have now normalized.  He will remain on iron until February.    ECOG Performance   ECOG Performance Status: 0    Distress Assessment  Distress Assessment Score: No distress    Pain  Currently in Pain: Yes  Pain Score (Initial OR Reassessment): 2  Location: left forearm, abd    Diagnosis:    1.  Colon cancer: Diagnosed in August 2019: Right-sided, cecum.  Mucinous adenocarcinoma.  Grade 2.  11 cm.  32 regional nodes negative. dMMR/MSI-high.     2.  Pulmonary embolism: Diagnosed August 2019.  Provoked.  Small burden of pulmonary emboli limited to the right upper lobe.    Treatment:    Right hemicolectomy August 19, 2019.  No adjuvant therapy was recommended.    Interim History:    Milton returns today for a follow-up visit.  He is here mostly to recheck his hemoglobin to make sure he is responding to iron replacement.  He is been feeling fine.  His only complaint is frequent loose stools.  4-5 a day.  Associated with urgency.  No other abdominal pain.  No nausea or  vomiting.    Review of Systems:    Constitutional  Constitutional (WDL): All constitutional elements are within defined limits  Neurosensory  Neurosensory (WDL): All neurosensory elements are within defined limits  Cardiovascular  Cardiovascular (WDL): All cardiovascular elements are within defined limits  Pulmonary  Respiratory (WDL): Within Defined Limits  Gastrointestinal  Gastrointestinal (WDL): Exceptions to WDL(abd tender from recent hernia repair)  Anorexia: None  Constipation: None  Diarrhea: Increase of <4 stools per day over baseline, mild increase in ostomy output compared to baseline(watery-brown)  Dysphagia: None  Esophagitis: None  Nausea: None  Pharyngitis: None  Vomiting: None  Dysgeusia: None  Dry Mouth: None  Genitourinary  Genitourinary (WDL): All genitourinary elements are within defined limits  Integumentary  Integumentary (WDL): Exceptions to WDL(left forearm bruise and swelling, worse 1 week ago-improving)  Alopecia: None  Rash Maculo-Papular: None  Pruritus: None  Urticaria: None  Palmar-Plantar Erythrodysesthesia Syndrome: None  Flushing: None  Patient Coping  Patient Coping: Accepting;Open/discussion  Accompanied by  Accompanied by: Alone    Past History:    Past Medical History:   Diagnosis Date     Abnormal CT of the abdomen 8/12/2019    Added automatically from request for surgery [id number]     Acute chest pain 8/12/2019     Acute Myocardial Infarction     Created by Conversion      Alcoholic Hepatitis     Created by Conversion      Angioedema     Created by Conversion      Benign Tubulovillous Adenoma Of The Large Intestine     Created by Conversion      Coronary Artery Disease     Created by Conversion [facility] Annotation: Jun 9 2008  3:15PM - Tammy Flowers: [place] May 2002      Cough     Created by Conversion      Diaphragmatic Paralysis     Created by Conversion [facility] Annotation: Dec 10 2009 11:10AM - Tammy Flowers: [email or web address]. 2004.  No clear etiology      DVT (deep venous  thrombosis) (H)      Feeling Full Before Finishing Meals (Early Satiety)     Created by Conversion      Hyperlipidemia     Created by Conversion      Lactase Deficiency Syndrome     Created by Conversion      Lower Back Pain     Created by Conversion      PE (pulmonary thromboembolism) (H)      Pre-operative cardiovascular examination 6/3/2015     Rectal bleeding 8/8/2019     Sleep apnea     CPAP     Physical Exam:    Recent Vitals 11/25/2019   Height -   Weight 180 lbs 13 oz   BSA (m2) 1.95 m2   /64   Pulse 79   Temp 98.1   Temp src 1   SpO2 93   Some recent data might be hidden     General: patient appears stated age of 64 y.o.. Nontoxic and in no distress.   HEENT: Head: atraumatic, normocephalic. Sclerae anicteric.  Chest:  Normal respiratory effort  Cardiac:  No edema.   Abdomen: abdomen is non-distended  Extremities: normal tone and muscle bulk.  Skin: no lesions or rash. Warm and dry.   CNS: alert and oriented. Grossly non-focal.   Psychiatric: normal mood and affect.     Lab Results:    Recent Results (from the past 168 hour(s))   Comprehensive Metabolic Panel   Result Value Ref Range    Sodium 140 136 - 145 mmol/L    Potassium 4.3 3.5 - 5.0 mmol/L    Chloride 102 98 - 107 mmol/L    CO2 27 22 - 31 mmol/L    Anion Gap, Calculation 11 5 - 18 mmol/L    Glucose 128 (H) 70 - 125 mg/dL    BUN 9 8 - 22 mg/dL    Creatinine 0.84 0.70 - 1.30 mg/dL    GFR MDRD Af Amer >60 >60 mL/min/1.73m2    GFR MDRD Non Af Amer >60 >60 mL/min/1.73m2    Bilirubin, Total 0.6 0.0 - 1.0 mg/dL    Calcium 10.0 8.5 - 10.5 mg/dL    Protein, Total 7.1 6.0 - 8.0 g/dL    Albumin 4.0 3.5 - 5.0 g/dL    Alkaline Phosphatase 268 (H) 45 - 120 U/L    AST 63 (H) 0 - 40 U/L    ALT 57 (H) 0 - 45 U/L   HM1 (CBC with Diff)   Result Value Ref Range    WBC 7.9 4.0 - 11.0 thou/uL    RBC 5.18 4.40 - 6.20 mill/uL    Hemoglobin 14.5 14.0 - 18.0 g/dL    Hematocrit 45.9 40.0 - 54.0 %    MCV 89 80 - 100 fL    MCH 28.0 27.0 - 34.0 pg    MCHC 31.6 (L) 32.0 -  36.0 g/dL    RDW 19.9 (H) 11.0 - 14.5 %    Platelets 219 140 - 440 thou/uL    MPV 8.9 8.5 - 12.5 fL    Neutrophils % 57 50 - 70 %    Lymphocytes % 31 20 - 40 %    Monocytes % 8 2 - 10 %    Eosinophils % 3 0 - 6 %    Basophils % 1 0 - 2 %    Neutrophils Absolute 4.5 2.0 - 7.7 thou/uL    Lymphocytes Absolute 2.5 0.8 - 4.4 thou/uL    Monocytes Absolute 0.7 0.0 - 0.9 thou/uL    Eosinophils Absolute 0.2 0.0 - 0.4 thou/uL    Basophils Absolute 0.1 0.0 - 0.2 thou/uL     Imaging:    No results found.      Signed by: Bayron Sy MD

## 2021-06-03 NOTE — PROGRESS NOTES
I met with Milton to present his SCP. I explained the contents of the care plan and reviewed the treatment summary in its' entirety. He voiced understanding. I pointed out the survivorship resources should he be interested in online or reading resources. I pointed out my business card and asked if he could look the plan over in the next week or so as I would like to call him on follow up. He again voiced understanding. I congratulated him on his survivorship and thanked him for his time. Rebecca

## 2021-06-04 ENCOUNTER — HOSPITAL ENCOUNTER (OUTPATIENT)
Dept: CT IMAGING | Facility: CLINIC | Age: 66
Discharge: HOME | End: 2021-06-04
Payer: MEDICARE

## 2021-06-04 VITALS
OXYGEN SATURATION: 94 % | HEIGHT: 66 IN | DIASTOLIC BLOOD PRESSURE: 70 MMHG | WEIGHT: 187 LBS | SYSTOLIC BLOOD PRESSURE: 152 MMHG | HEART RATE: 64 BPM | BODY MASS INDEX: 30.05 KG/M2

## 2021-06-04 DIAGNOSIS — R07.9 ACUTE CHEST PAIN: ICD-10-CM

## 2021-06-04 DIAGNOSIS — R06.02 SHORTNESS OF BREATH: ICD-10-CM

## 2021-06-04 DIAGNOSIS — R79.89 ELEVATED D-DIMER: ICD-10-CM

## 2021-06-04 NOTE — TELEPHONE ENCOUNTER
Controlled Substance Refill Request  Medication Name:   Requested Prescriptions     Pending Prescriptions Disp Refills     codeine 30 MG tablet 90 tablet 0     Sig: Take 1 tablet (30 mg total) by mouth every 8 (eight) hours as needed for pain.     Date Last Fill: 11/15/19  Pharmacy: [id number] [address] [place] [facility] [place] [address]      Submit electronically to pharmacy  Controlled Substance Agreement Date Scanned:   Encounter-Level CSA Scan Date:    There are no encounter-level csa scan date.       Last office visit with prescriber/PCP: 8/7/2019 Jeremías Manriquez MD OR same dept: 8/7/2019 Jeremías Manriquez MD OR same specialty: 8/30/2019 Antonio Cota CNP  Last physical: Visit date not found Last MTM visit: Visit date not found

## 2021-06-04 NOTE — TELEPHONE ENCOUNTER
Refill request for medication: codeine 30 MG tablet  Last visit addressing this medication: 08/30/2019  Follow up plan Return in about 3 months (around 11/30/2019) for med check.     Last refill on 11/15/2019, quantity #90   CSA completed 08/07/2019   checked  12/16/19, last dispensed refill 11/18/2019    Appointment: No appointment scheduled at this time.     Sherry Ford, CMA

## 2021-06-05 VITALS
HEIGHT: 65 IN | DIASTOLIC BLOOD PRESSURE: 88 MMHG | HEART RATE: 67 BPM | WEIGHT: 186 LBS | OXYGEN SATURATION: 96 % | BODY MASS INDEX: 30.99 KG/M2 | SYSTOLIC BLOOD PRESSURE: 180 MMHG

## 2021-06-05 VITALS
HEART RATE: 86 BPM | BODY MASS INDEX: 31.02 KG/M2 | DIASTOLIC BLOOD PRESSURE: 80 MMHG | OXYGEN SATURATION: 93 % | WEIGHT: 186.4 LBS | TEMPERATURE: 98.1 F | SYSTOLIC BLOOD PRESSURE: 198 MMHG

## 2021-06-05 NOTE — TELEPHONE ENCOUNTER
Refill request for medication: codeine 30 MG tablet  Last visit addressing this medication: 08/30/2019  Follow up plan Return in about 3 months (around 11/30/2019) for med check.   months  Last refill on 12/16/2019, quantity #90   CSA completed 08/07/2019   checked  01/28/20, last dispensed refill 12/29/2019    Appointment: No appointment scheduled at this time     Sherry Ford, CMA

## 2021-06-05 NOTE — TELEPHONE ENCOUNTER
Controlled Substance Refill Request  Medication Name:   Requested Prescriptions     Pending Prescriptions Disp Refills     codeine 30 MG tablet 90 tablet 0     Sig: Take 1 tablet (30 mg total) by mouth every 8 (eight) hours as needed for pain.     Date Last Fill: 12/16/19  Is patient out of medication?: No, 3 days left  Patient notified refills processed within 3 business days:  Yes  [facility]  Submit electronically to pharmacy  Controlled Substance Agreement on file:   Encounter-Level CSA Scan Date:    There are no encounter-level csa scan date.        Last office visit:  8/30/19  RTC per charting due 11/30/19.

## 2021-06-05 NOTE — TELEPHONE ENCOUNTER
Refill Approved    Rx renewed per Medication Renewal Policy. Medication was last renewed on 11/15/19.    Soo Jacobsen, [facility] Triage/Med Refill 1/17/2020     Requested Prescriptions   Pending Prescriptions Disp Refills     pantoprazole (PROTONIX) 40 MG tablet [Pharmacy Med Name: PANTOPRAZOLE SOD 40MG EC TABLET] 90 tablet 0     Sig: TAKE 1 TABLET BY MOUTH  DAILY       GI Medications Refill Protocol Passed - 1/16/2020  3:55 AM        Passed - PCP or prescribing provider visit in last 12 or next 3 months.     Last office visit with prescriber/PCP: 8/7/2019 Jeremías Manriquez MD OR same dept: 8/7/2019 Jeremías Manriquez MD OR same specialty: 8/30/2019 Antonio Cota CNP  Last physical: Visit date not found Last MTM visit: Visit date not found   Next visit within 3 mo: Visit date not found  Next physical within 3 mo: Visit date not found  Prescriber OR PCP: Jeremías Manriquez MD  Last diagnosis associated with med order: There are no diagnoses linked to this encounter.  If protocol passes may refill for 12 months if within 3 months of last provider visit (or a total of 15 months).

## 2021-06-06 NOTE — TELEPHONE ENCOUNTER
----- Message from Jenni Nayak RN sent at 11/25/2019  2:41 PM CST -----  I presented CATARINO bryant

## 2021-06-06 NOTE — TELEPHONE ENCOUNTER
Controlled Substance Refill Request  Medication Name:   Requested Prescriptions     Pending Prescriptions Disp Refills     codeine 30 MG tablet 90 tablet 0     Sig: Take 1 tablet (30 mg total) by mouth every 8 (eight) hours as needed for pain.     Date Last Fill: 01/28/20  [id number]  Submit electronically to pharmacy  Controlled Substance Agreement on file:   Encounter-Level CSA Scan Date:    There are no encounter-level csa scan date.        Last office visit:

## 2021-06-06 NOTE — TELEPHONE ENCOUNTER
Refill request for medication: codeine 30 MG tablet  Last visit addressing this medication: 08/30/2019  Follow up plan 3  months  Last refill on 01/28/2020, quantity #90   CSA completed 08/07/2019   checked  02/27/20, last dispensed refill 01/30/2020    Appointment: No appointment scheduled at this time     Sherry Ford, [facility]

## 2021-06-06 NOTE — TELEPHONE ENCOUNTER
RN cannot approve Refill Request    RN can NOT refill this medication Protocol failed and NO refill given.       Cindy Tadeo, [facility] Triage/Med Refill 3/5/2020    Requested Prescriptions   Pending Prescriptions Disp Refills     isosorbide dinitrate (ISORDIL) 10 MG tablet [Pharmacy Med Name: ISOSORBIDE DINITRATE  10MG  TAB] 270 tablet 3     Sig: TAKE 1 TABLET BY MOUTH 3  TIMES DAILY AT 8:00 AM  12:00 NOON AND 6:00 PM.       Isosorbide Refill Protocol Failed - 3/5/2020  4:19 AM        Failed - Visit with PCP or prescribing provider visit in last 6 months or next 3 months     Last office visit with prescriber/PCP: Visit date not found OR same dept: 8/7/2019 Jeremías Manriquez MD OR same specialty: 8/30/2019 Antonio Cota CNP Last physical: Visit date not found Last MTM visit: Visit date not found     Next appt within 3 mo: Visit date not found  Next physical within 3 mo: Visit date not found  Prescriber OR PCP: Jeremías Manriquez MD  Last diagnosis associated with med order: 1. NSTEMI (non-ST elevated myocardial infarction) (H)  - isosorbide dinitrate (ISORDIL) 10 MG tablet [Pharmacy Med Name: ISOSORBIDE DINITRATE  10MG  TAB]; TAKE 1 TABLET BY MOUTH 3  TIMES DAILY AT 8:00 AM  12:00 NOON AND 6:00 PM.  Dispense: 270 tablet; Refill: 1    If protocol passes may refill for 6 months if within 3 months of last provider visit (or a total of 9 months).              Passed - Blood pressure filed in past 12 months     BP Readings from Last 1 Encounters:   11/25/19 139/64

## 2021-06-07 NOTE — TELEPHONE ENCOUNTER
Controlled Substance Refill Request  Medication Name:   Requested Prescriptions     Pending Prescriptions Disp Refills     codeine 30 MG tablet 90 tablet 0     Sig: Take 1 tablet (30 mg total) by mouth every 8 (eight) hours as needed for pain.     Date Last Fill: 2/28/20  Is patient out of medication?: No, 4 days left  Patient notified refills processed within 3 business days:  Yes  [facility]  Submit electronically to pharmacy  Controlled Substance Agreement on file:   Encounter-Level CSA Scan Date:    There are no encounter-level csa scan date.        Last office visit:  11/25/19

## 2021-06-07 NOTE — PROGRESS NOTES
[facility] Hematology and Oncology Progress Note    Patient: Christian Edwards  MRN: [id number]  Date of Service: 03/24/2020         The patient has chosen to have the visit conducted as a telephone visit, to reduce risk of exposure given the current status of Coronavirus in our community. This telephone visit is being conducted via a call between the patient and physician/provider. Health care needs are being provided without a physical exam.     The patient has been notified of following:      We have found that certain health care needs can be provided without the need for a physical exam.  This service lets us provide the care you need with a short phone conversation.  If a prescription is necessary we can send it directly to your pharmacy.  If lab work is needed we can place an order for that and you can then stop by our lab to have the test done at a later time.  If during the course of the call the physician/provider feels a telephone visit is not appropriate, you will not be charged for this service    The patient consents to a telephone visit.     Assessment and Plan:    Cancer Staging  Primary adenocarcinoma of ascending colon (H)  Staging form: Colon And Rectum, AJCC 8th Edition  - Clinical: No stage assigned - Unsigned  - Pathologic stage from 9/16/2019: Stage I (pT2, pN0, cM0) - Signed by Bayron Sy MD on 9/16/2019    1.  Colon cancer: He is now 7 months out from his surgery.  CT scan images show no evidence of recurrent disease.  He had early stage disease and has a low risk of recurrence.  I reviewed that with him again today.  We will see him back in a year with a scan and labs.  He will let us know in the interim if he develops any new symptoms.    2.  Pulmonary embolism: Xarelto should have been stopped in February of this year.    Total time spent on phone call was 10 minutes.    ECOG Performance   ECOG Performance Status: 0    Distress Assessment  Distress Assessment Score: 7(r/t edvin  issues)    Pain  Currently in Pain: No/denies    Diagnosis:    1.  Colon cancer: Diagnosed in August 2019: Right-sided, cecum.  Mucinous adenocarcinoma.  Grade 2.  11 cm.  32 regional nodes negative. dMMR/MSI-high.     2.  Pulmonary embolism: Diagnosed August 2019.  Provoked.  Small burden of pulmonary emboli limited to the right upper lobe.    Treatment:    Right hemicolectomy August 19, 2019.  No adjuvant therapy was recommended.    Interim History:    Milton is phoned today for a virtual visit.  He states he is doing well.  No complaints.  No abdominal pain, nausea vomiting, or change in bowel or bladder habits.  No weight loss.  No acute complaints today.    Review of Systems:    Constitutional  Constitutional (WDL): All constitutional elements are within defined limits  Neurosensory  Neurosensory (WDL): All neurosensory elements are within defined limits  Cardiovascular  Cardiovascular (WDL): All cardiovascular elements are within defined limits  Pulmonary  Respiratory (WDL): Within Defined Limits  Gastrointestinal  Gastrointestinal (WDL): All gastrointestinal elements are within defined limits  Genitourinary  Genitourinary (WDL): All genitourinary elements are within defined limits  Integumentary  Integumentary (WDL): All integumentary elements are within defined limits  Patient Coping  Patient Coping: Accepting  Accompanied by  Accompanied by: Alone    Past History:    Past Medical History:   Diagnosis Date     Abnormal CT of the abdomen 8/12/2019    Added automatically from request for surgery [id number]     Acute chest pain 8/12/2019     Acute Myocardial Infarction     Created by Conversion      Alcoholic Hepatitis     Created by Conversion      Angioedema     Created by Conversion      Benign Tubulovillous Adenoma Of The Large Intestine     Created by Conversion      Coronary Artery Disease     Created by Conversion [facility] Annotation: Jun 9 2008  3:15PM - Tammy Flowers: [place] May 2002      Cough     Created  by Conversion      Diaphragmatic Paralysis     Created by Conversion [facility] Annotation: Dec 10 2009 11:10AM - Tammy Flowers: [email or web address]. 2004.  No clear etiology      DVT (deep venous thrombosis) (H)      Feeling Full Before Finishing Meals (Early Satiety)     Created by Conversion      Hyperlipidemia     Created by Conversion      Lactase Deficiency Syndrome     Created by Conversion      Lower Back Pain     Created by Conversion      PE (pulmonary thromboembolism) (H)      Pre-operative cardiovascular examination 6/3/2015     Rectal bleeding 8/8/2019     Sleep apnea     CPAP     Physical Exam:    Recent Vitals 11/25/2019   Height -   Weight 180 lbs 13 oz   BSA (m2) 1.95 m2   /64   Pulse 79   Temp 98.1   Temp src 1   SpO2 93   Some recent data might be hidden     Not performed.    Lab Results:    Recent Results (from the past 168 hour(s))   HM1 (CBC with Diff)   Result Value Ref Range    WBC 7.2 4.0 - 11.0 thou/uL    RBC 5.28 4.40 - 6.20 mill/uL    Hemoglobin 16.7 14.0 - 18.0 g/dL    Hematocrit 50.3 40.0 - 54.0 %    MCV 95 80 - 100 fL    MCH 31.6 27.0 - 34.0 pg    MCHC 33.2 32.0 - 36.0 g/dL    RDW 12.7 11.0 - 14.5 %    Platelets 221 140 - 440 thou/uL    MPV 9.8 8.5 - 12.5 fL    Neutrophils % 59 50 - 70 %    Lymphocytes % 27 20 - 40 %    Monocytes % 8 2 - 10 %    Eosinophils % 5 0 - 6 %    Basophils % 1 0 - 2 %    Neutrophils Absolute 4.2 2.0 - 7.7 thou/uL    Lymphocytes Absolute 1.9 0.8 - 4.4 thou/uL    Monocytes Absolute 0.6 0.0 - 0.9 thou/uL    Eosinophils Absolute 0.4 0.0 - 0.4 thou/uL    Basophils Absolute 0.1 0.0 - 0.2 thou/uL   POCT CREATININE   Result Value Ref Range    iSTAT Creatinine 0.7 0.7 - 1.3 mg/dL    iSTAT GFR MDRD Af Amer >60 >60 mL/min/1.73m2    iSTAT GFR MDRD Non Af Amer >60 >60 mL/min/1.73m2     Imaging:    Ct Chest Abdomen Pelvis Without Oral With Iv Contrast    Result Date: 3/20/2020  EXAM: CT CHEST ABDOMEN PELVIS WO ORAL W IV CONTRAST LOCATION: [facility] DATE/TIME:  3/20/2020 11:55 AM INDICATION: Colon cancer COMPARISON: CT of the chest with contrast 8/8/2019 and CT of the abdomen and pelvis 8/7/2019 TECHNIQUE: CT scan of the chest, abdomen, and pelvis was performed following injection of IV contrast. Multiplanar reformats were obtained. Dose reduction techniques were used. CONTRAST: Iohexol (Omni) 100 mL FINDINGS: LUNGS AND PLEURA: The pattern of diffuse subpleural reticular opacities is unchanged from 8/8/2019 reflecting an underlying fibrosing lung disorder. Upper lung zone predominant centrilobular emphysema is also unchanged. There are no new nodules or airspace opacities. The trachea and central airways are patent. There is no pleural fluid, thickening, or nodularity. MEDIASTINUM: Three-vessel coronary stents. Cardiac chambers are not enlarged. No pericardial effusion. The main pulmonary artery is normal in size. A few bilateral foci of calcification along the central pulmonary arteries are unchanged. Normal caliber thoracic aorta. Mild atheromatous calcifications in the proximal great vessels and descending aorta. No enlarged mediastinal or hilar lymph nodes are present. The esophagus is decompressed. HEPATOBILIARY: Homogeneous attenuation of the liver parenchyma. Normal contrast opacification of the portal and hepatic veins. No radiographic headache gallbladder or biliary tract calculi. The gallbladder is not distended. The common bile duct is normal  size. PANCREAS: Normal. SPLEEN: Normal. ADRENAL GLANDS: Unchanged thickening of the left adrenal gland without a discrete nodule. The right adrenal gland is normal. KIDNEYS/BLADDER: The kidneys are normal in size with symmetric cortical enhancement and normal cortex thickness. No nephrolithiasis or obstructive uropathy. The urinary bladder is decompressed. BOWEL: The stomach is decompressed. Normal caliber small bowel. Previous right colectomy with patent small bowel to colon anastomosis. Normal colonic stool burden. No  colon wall thickening. Mild sigmoid colonic diverticulosis is present. LYMPH NODES: No new mesenteric, aortocaval, or iliac chain lymph node enlargement. VASCULATURE: Moderate atheromatous calcification of the infrarenal abdominal aorta contiguous with the iliac arteries. No aortoiliac aneurysm. PELVIC ORGANS: Prostate gland is normal in size. Seminal vesicles are symmetric. MUSCULOSKELETAL: Thoracic vertebra are maintained in height. Small thoracolumbar degenerative osteophytes. No lytic or sclerotic bone lesions are present.     1.  Status post right colectomy. No findings to suggest locally recurrent malignancy or thoracoabdominal metastases. 2.  Findings of a diffuse fibrosing lung disorder are present. The extent of fibrosis is unchanged. 3.  Emphysema. 4.  Multiple coronary stents. 5.  Small calcifications along central pulmonary arteries. This could be secondary to chronic thromboembolism or less likely long-standing pulmonary hypertension.      Signed by: Bayron Sy MD

## 2021-06-07 NOTE — TELEPHONE ENCOUNTER
Reviewed labs with Dr. Sy.  Per Dr. Sy, CEA still a little bit high.  Nonspecific but would like to repeat his labs in 4 months.  We will still keep the original plan of 1 year follow-up with labs and imaging.      I called patient with this message.  He verbalized understanding. He would like to be called for lab appointment when gets closer.  Message to schedulers to add to recall list/CLEMENT Dent RN

## 2021-06-07 NOTE — TELEPHONE ENCOUNTER
Last Refill: 2/28/20 for #90, 0 refills - Take 1 tablet (30 mg total) by mouth every 8 (eight) hours as needed for pain.  Last OV: [facility] Virtual Visit 3/24/20  CSA: 8/7/19

## 2021-06-07 NOTE — TELEPHONE ENCOUNTER
LM for patient that two of his lab tests did not get run because the sample hemolyzed.  Advised patient to contact the [facility] cancer [facility] to set up a time to come in for a no charge re-draw.

## 2021-06-07 NOTE — TELEPHONE ENCOUNTER
Medication Question or Clarification  Who is calling: patient  What medication are you calling about (include dose and sig)?:   codeine 30 MG tablet  90 tablet  0  4/3/2020      Sig - Route: Take 1 tablet (30 mg total) by mouth every 8 (eight) hours as needed for pain. - Oral     Sent to pharmacy as: codeine sulfate 30 mg tablet     Earliest Fill Date: 4/3/2020         Who prescribed the medication?: Jeremías Manriquez MD    What is your question/concern?: [facility] is out of this medication. Needs prescription sent to [facility] [place]  Requested Pharmacy: [facility]  Okay to leave a detailed message?: Yes

## 2021-06-08 NOTE — TELEPHONE ENCOUNTER
Refill request for medication: codeine 30 MG tablet   Last visit addressing this medication: 08/30/2019  Follow up plan 3  months  Last refill on 04/09/2020, quantity #90   CSA completed 08/07/2019   checked  05/18/20, last dispensed refill 04/11/2020    Appointment: No appiontments scheduled at this time     Sherry Ford, [facility]

## 2021-06-08 NOTE — TELEPHONE ENCOUNTER
Controlled Substance Refill Request  Medication Name:   Requested Prescriptions     Pending Prescriptions Disp Refills     codeine 30 MG tablet 90 tablet 0     Sig: Take 1 tablet (30 mg total) by mouth every 8 (eight) hours as needed for pain.     Date Last Fill: 04/09/20  [facility]  Submit electronically to pharmacy  Controlled Substance Agreement on file:   Encounter-Level CSA Scan Date:    There are no encounter-level csa scan date.        Last office visit:      Caller would like a call if  [facility] runs out of medication again and will need to go some one else.

## 2021-06-09 NOTE — TELEPHONE ENCOUNTER
Last office visit with PCP 8-7-19 was to follow up in 3 months. His last office visit with the cancer center 3-24-20. Last filled per  5-18-20. Please advise

## 2021-06-09 NOTE — TELEPHONE ENCOUNTER
Controlled Substance Refill Request  Medication Name:   Requested Prescriptions     Pending Prescriptions Disp Refills     codeine 30 MG tablet 90 tablet 0     Sig: Take 1 tablet (30 mg total) by mouth every 8 (eight) hours as needed for pain.     Date Last Fill: 6/25/2020  [facility]  Submit electronically to pharmacy  Controlled Substance Agreement on file:   Encounter-Level CSA Scan Date:    There are no encounter-level csa scan date.        Last office visit:  8/30/2019

## 2021-06-09 NOTE — TELEPHONE ENCOUNTER
Refill request for medication: codeine 30 MG tablet  Last visit addressing this medication: 08/07/2019  Follow up plan 3  months  Last refill on 06/25/2020, quantity #90   CSA completed 08/07/2019   checked  07/24/20, last dispensed refill 06/25/2020    Appointment: No appointments scheduled at this time     Sherry Ford, [facility]

## 2021-06-09 NOTE — PROGRESS NOTES
Dear Dr. Jeremías Manriquez MD  [address] [place] [address],    Thank you for the opportunity to participate in the care of Christian Edwards.     He is a 61 y.o. y/o male patient who comes to the sleep medicine clinic for follow up.    The patient has a history of diaphragmatic paralysis and uses IVAP therapy.  Current settings:  Height 68 in  Target VA 5.7 L/min  Current target RR: 14  EPAP: 12 cwp  PS 3-11 cwp  Ti: 1.0 to 2.0  Rise: 500 ms  Trigger: VH  Cycle: VL    The patient reports that his device is stopping in the middle of the night and he wakes up in the middle of the night. The device usually turns off in the early hours of the morning. He started having this difficulties 1 month ago.    He feels that his breathing has been getting worse during the day. He is having more shortness of breath during the day. He feels that he has to takes deep breaths while awake.     Current results from the device:  Residual AHI: 1.9  RR: 17  Usage: 3/30  Average usage: 3 hours and 31 minutes.  % trigger: 95  % Cycle: 71%  Average IPAP: 18.6      Past Medical History:   Diagnosis Date     Acute Myocardial Infarction     Created by Conversion      Alcoholic Hepatitis     Created by Conversion      Angioedema     Created by Conversion      Benign Tubulovillous Adenoma Of The Large Intestine     Created by Conversion      Coronary Artery Disease     Created by Conversion [facility] Annotation: Jun 9 2008  3:15PM - Tammy Flowers: [place] May 2002      Coronary Artery Disease     Created by [facility] Annotation: Jun 9 2008  3:15PM - Tammy Flowers: [place] May 2002      Cough     Created by Conversion      Diaphragmatic Paralysis     Created by [facility] Annotation: Dec 10 2009 11:10AM - Tammy Flowers: [email or web address]. 2004.  No clear etiology      Diaphragmatic Paralysis     Created by [facility] Annotation: Dec 10 2009 11:10AM - Tammy Flowers: [email or web address]. 2004.  No clear etiology       Feeling Full Before Finishing Meals (Early Satiety)     Created by Conversion      Hyperlipidemia     Created by Conversion      Lactase Deficiency Syndrome     Created by Conversion      Lower Back Pain     Created by Conversion      Lower Back Pain     Created by Conversion      Lower Back Pain     Created by Conversion        No past surgical history on file.    Social History     Social History     Marital status:      Spouse name: N/A     Number of children: N/A     Years of education: N/A     Occupational History     Not on file.     Social History Main Topics     Smoking status: Former Smoker     Quit date: 1/1/2002     Smokeless tobacco: Never Used     Alcohol use 3.0 oz/week     5 Shots of liquor per week      Comment: drinks daily.     Drug use: No     Sexual activity: Not on file     Other Topics Concern     Not on file     Social History Narrative       Review of Systems:  General: No weight gain, no weight loss  Eyes: No vision changes  ENT: No hearing changes  Cardio: No chest pain, no nocturnal dyspnea  Respiratory: No shortness of breath, no cough  Gastrointestinal: No diarrhea, no constipation  Genitourinary: No excessive urination  Tegumentary: No rashes  Neurological: No seizures, no loss of consciousness  Endo: No heat or cold intolerance.    Current Outpatient Prescriptions   Medication Sig Dispense Refill     aspirin 325 MG tablet Take 325 mg by mouth daily.       atorvastatin (LIPITOR) 20 MG tablet TAKE 1 TABLET(20 MG) BY MOUTH BEDTIME 90 tablet 0     atorvastatin (LIPITOR) 20 MG tablet TAKE 1 TABLET BY MOUTH AT BEDTIME 90 tablet 2     codeine 30 MG tablet Take 1 tablet (30 mg total) by mouth every 12 (twelve) hours as needed for pain. 60 tablet 0     metoprolol-hydrochlorothiazide (LOPRESSOR HCT) 100-25 mg per tablet TAKE 1 TABLET BY MOUTH DAILY 90 tablet 0     multivitamin (MULTIVITAMIN) per tablet Take 1 tablet by mouth daily.       omeprazole (PRILOSEC) 40 MG capsule TAKE 1  "CAPSULE(40 MG) BY MOUTH DAILY 90 capsule 0     omeprazole (PRILOSEC) 40 MG capsule TAKE 1 CAPSULE(40 MG) BY MOUTH DAILY 90 capsule 0     omeprazole (PRILOSEC) 40 MG capsule TAKE 1 CAPSULE(40 MG) BY MOUTH DAILY 90 capsule 3     No current facility-administered medications for this visit.        No Known Allergies    Physical Exam:  /76 (Patient Site: Right Arm, Patient Position: Sitting, Cuff Size: Adult Large)  Pulse 80  Resp 20  Ht 5' 6\" (1.676 m)  Wt 200 lb (90.7 kg)  SpO2 95%  BMI 32.28 kg/m2  BMI:Body mass index is 32.28 kg/(m^2).   GEN: NAD, obese  Neurological: Alert, oriented to time, place, and person.  Psych: normal mood, normal affect     Labs/Studies:     I reviewed the efficacy and compliance report from his device.     Lab Results   Component Value Date    WBC 9.7 12/20/2016    HGB 16.5 12/20/2016    HCT 48.3 12/20/2016    MCV 96 12/20/2016     12/20/2016         Chemistry        Component Value Date/Time     12/20/2016 1215    K 4.2 12/20/2016 1215    CL 99 12/20/2016 1215    CO2 27 12/20/2016 1215    BUN 10 12/20/2016 1215    CREATININE 0.76 12/20/2016 1215     12/20/2016 1215        Component Value Date/Time    CALCIUM 10.1 12/20/2016 1215    ALKPHOS 148 (H) 12/20/2016 1215    AST 58 (H) 12/20/2016 1215    ALT 47 (H) 12/20/2016 1215    BILITOT 0.6 12/20/2016 1215            Assessment and Plan:  In summary Christian Edwards is a 61 y.o. year old male who is here for follow up.    1. Hemidiaphragm paralysis.  He uses IVAPS.  His usage has been low during the last 6 months.   I encouraged him to increase his usage. He reports falling asleep unintentionally without the device and he will try to pay attention to this.  I will write an order to check his device.  Based on his symptoms, he seems to have worsening of his respiratory function even while awake. He will contact his pulmonologist.   % of triggered episodes is decreased compared to last year.     Patient " verbalized understanding of these issues, agrees with the plan and all questions were answered today. Patient was given an opportuntity to voice any other symptoms or concerns not listed above. Patient did not have any other symptoms or concerns.      Patient told to return in 3 months. Patient instructed to stop at  to schedule appointment before leaving today.    MD JOAQUIM Telles Board Certified in Internal Medicine and Sleep Medicine  [facility].    We spent a total of 25 minutes during this visit and more than 50% of the time was used to  the patient about hypoventilation.

## 2021-06-10 NOTE — PROGRESS NOTES
Pulmonary Note  5/17/2017      CCx: Follow-up Restrictive ventilatory dysfunction, spontaneous diaphragm paralysis      History:     Patient is a pleasant 59-year-old male with a past medical history significant for an MI, alcoholic hepatitis, tubulovillous adenoma of the large intestine and hyperlipidemia who was originally referred by Dr. Jeronimo from the sleep clinic for further evaluation of ventilatory insufficiency.  His pulmonary function testing demonstrated an intact spirometry, plethysmography and diffusion capacity, however, demonstrated diaphragmatic muscle weakness with a diminished MIP and MEP.  Over the last he has complained of shortness of breath which has been manageable and as such we have been following him symptomatically and with annual pulmonary function testing including diaphragmatic muscle testing.  He continues to be compliant with his IVAPS device at night (although had some issues with compliance recently).  He tells me today that his shortness of breath is much worse compared to before and that minimal activity leaves him winded.  He did undergo pulmonary function testing today and his MIP and MEP are certainly worse today.  He has no bulbar symptoms and specifically does not denies dysphagia, choking, any other kind of weakness.  No fevers, chills, night sweats, weight loss or weight gain.    Medical summary:  He mentions being diagnosed with diaphragm paralysis in 2004.  He denies any trauma or preceding events that he can recall that correlate with his diagnosis.  He mentions undergoing fairly extensive workup with prior sniff test that showed paralysis on the right.  Subsequent imaging included CT of the chest that showed right middle lobe atelectasis but no obstructing lesion or no other parenchymal abnormality.  He additionally has undergone MRI of the brain and neck with no lesions.  Additionally underwent abdominal CT and MRI failed to demonstrate any pathology other than upper  protrusion of his liver into his hemithorax.       PMH:  Reviewed in epic and unchanged from prior visit.    No past surgical history on file.  Social History     Social History     Marital status:      Spouse name: N/A     Number of children: N/A     Years of education: N/A     Occupational History     Not on file.     Social History Main Topics     Smoking status: Former Smoker     Quit date: 1/1/2002     Smokeless tobacco: Never Used     Alcohol use 3.0 oz/week     5 Shots of liquor per week      Comment: drinks daily.     Drug use: No     Sexual activity: Not on file     Other Topics Concern     Not on file     Social History Narrative     Family History   Problem Relation Age of Onset     Sleep apnea Brother      COPD Mother      Heart disease Father      No Medical Problems Sister      No Known Allergies    Review of Systems - 12 point ROS negative        Medications:     Current Outpatient Prescriptions on File Prior to Visit   Medication Sig Dispense Refill     aspirin 325 MG tablet Take 325 mg by mouth daily.       atorvastatin (LIPITOR) 20 MG tablet TAKE 1 TABLET BY MOUTH AT BEDTIME 90 tablet 2     codeine 30 MG tablet Take 1 tablet (30 mg total) by mouth every 12 (twelve) hours as needed for pain. 60 tablet 0     metoprolol-hydrochlorothiazide (LOPRESSOR HCT) 100-25 mg per tablet TAKE 1 TABLET BY MOUTH DAILY 90 tablet 0     multivitamin (MULTIVITAMIN) per tablet Take 1 tablet by mouth daily.       omeprazole (PRILOSEC) 40 MG capsule TAKE 1 CAPSULE(40 MG) BY MOUTH DAILY 90 capsule 0     [DISCONTINUED] atorvastatin (LIPITOR) 20 MG tablet TAKE 1 TABLET(20 MG) BY MOUTH BEDTIME 90 tablet 0     [DISCONTINUED] omeprazole (PRILOSEC) 40 MG capsule TAKE 1 CAPSULE(40 MG) BY MOUTH DAILY 90 capsule 0     [DISCONTINUED] omeprazole (PRILOSEC) 40 MG capsule TAKE 1 CAPSULE(40 MG) BY MOUTH DAILY 90 capsule 3     No current facility-administered medications on file prior to visit.            Exam/Data:    Vitals  Vitals:    05/17/17 1411   BP: 140/72   Pulse: 73   Resp: 16   SpO2: 92%       EXAM:  Physical Exam   Constitutional: He is oriented to person, place, and time. He appears well-developed and well-nourished. No distress.   HENT:   Head: Normocephalic and atraumatic.   Nose: Nose normal.   Mouth/Throat: Oropharynx is clear and moist. No oropharyngeal exudate.   Eyes: Conjunctivae and EOM are normal. Pupils are equal, round, and reactive to light.   Neck: Normal range of motion. Neck supple. No JVD present. No tracheal deviation present. No thyromegaly present.   Cardiovascular: Normal rate, regular rhythm, normal heart sounds and intact distal pulses.  Exam reveals no friction rub.    No murmur heard.  Pulmonary/Chest: Effort normal and breath sounds normal. No respiratory distress. He has no wheezes. He has no rales. He exhibits no tenderness.   Abdominal: Soft. Bowel sounds are normal. He exhibits no distension. There is no tenderness.   Musculoskeletal: Normal range of motion. He exhibits no edema or tenderness.   Neurological: He is alert and oriented to person, place, and time. He has normal strength. He displays no atrophy. No cranial nerve deficit or sensory deficit. He exhibits normal muscle tone.   Reflex Scores:       Tricep reflexes are 2+ on the right side and 2+ on the left side.       Bicep reflexes are 2+ on the right side and 2+ on the left side.       Patellar reflexes are 2+ on the right side and 2+ on the left side.  Skin: Skin is warm and dry. He is not diaphoretic. No erythema. No pallor.   Psychiatric: He has a normal mood and affect. Thought content normal.   Vitals reviewed.        DATA  PFT's 5/17/17:  FEV1/FVC is 82% and is normal.  FEV1 is 2.35L (75%) predicted and is normal.  FVC is 2.85L (70%) predicted and normal.  There was no improvement in spirometry after a single inhaled dose of bronchodilator.  TLC is 4.98L (78%) predicted and is normal.  RV is 2.30L (99%) predicted and is  normal.  DLCO is 19.92ml/min/hg (74%) predicted and is normal when it is corrected for hemoglobin.  Flow volume loops indicate no significant abnormalities.  MEP 79  MIP -48    Impression:  Full Pulmonary Function Test is abnormal.  PFTs are consistent with no obstructive disease.  Spirometry is not consistent with reversibility.  There is no hyperinflation.  There is no air-trapping.  Diffusion capacity when corrected for hemoglobin is normal.   MIP and MEP both reduced and in particular the MIP is significantly reduced compared to the one from last year.    PFT's 5/11/16:  MEP 83  MIP -66    PFT's 5/2/16:  FEV1/FVC is 79% and is normal.  FEV1 is 2.3L (72%) predicted and is normal.  FVC is 2.92L (72%) predicted and normal.  There was no improvement in spirometry after a single inhaled dose of bronchodilator.  TLC is 4.27L (67%) predicted and is reduced.  RV is 1.7L (74%) predicted and is reduced.  DLCO is 22.38ml/min/hg (83%) predicted and is normal when it is corrected for hemoglobin.  Flow volume loops indicate no significant abnormalities.    Impression:  Full Pulmonary Function Test is abnormal.  PFTs are consistent with no evidence of restrictive or obstructive disease.  Spirometry is not consistent with reversibility.  There is no hyperinflation.  There is no air-trapping.  There is moderate restriction noted on plethysmography which is similar to his values last year.  Diffusion capacity when corrected for hemoglobin is normal.       PFT's 4/27/15:  FEV1/FVC is 78% and is normal.   FEV1 is 2.29L (69%) predicted and is reduced.   FVC is 2.94L (68%) predicted and reduced.   There was no improvement in spirometry after a single inhaled dose of bronchodilator.   TLC is 4.06L (63%) predicted and is reduced.   RV is 1.35L (65%) predicted and is reduced.   DLCO is 21.14ml/min/hg (74%) predicted and is reduced when it is corrected for hemoglobin.   Flow volume loops indicate questionable variable intrathoracic  obstruction with a flattened inspiratory loop.     Impression: Full Pulmonary Function Test is abnormal. PFTs are consistent with mild restrictivedisease.   Spirometry is not consistent with reversibility.   There is no hyperinflation.   There is no air-trapping.   Plethysmography indicates diminished lung volumes.   Diffusion capacity when corrected for hemoglobin is mildly reduced.   Flow volume loops concerning for variable intrathoracic obstruction, correlation with neck CT would be advisable.   MIP -16 (sitting) -44 (supine)  MEP 84 (sitting) 18 (supine)      HRCT Chest with inspiratory and expiratory cuts 5/26/15 (unchanged from prior visit):  1. Mild peripheral septal thickening without honeycombing. Findings compatible with nonspecific fibrosis.  2. No evidence of bronchiectasis nor tracheomalacia.  3. Coronary atherosclerosis.  4. Eccentric thickening gastric cardia adjacent to GE junction of uncertain significance with prominent adjacent to gastrohepatic lymph nodes. Consider upper endoscopy to further assess.    XR Sniff Test 4/24/15:  Right hemidiaphragm is elevated relative to the left.   Patient was observed in both the AP and lateral projections. Both diaphragms move normally during the sniff exam but the right does not move as far inferiorly as the left does.    Assessment/Plan:   Christian Edwards is a 61 y.o. male referred for further evaluation of restrictive ventilatory defect in setting or right hemidiaphragm weakness.  Sniff test  shows that both diaphragms move normally with restricted movement on the right and concerning the he has respiratory muscle strength continues to worsen for reasons that are not entirely clear.  It is probably worthwhile obtaining more functional set of testing on the patient.    1. We will place a referral to Dr. Louis for consideration of diaphragmatic plication.  2. We will order cardiopulmonary exercise testing.  3. Continue IVAPS therapy regarding his sleep  disordered breathing      Katlin MICHAELS Betito  Pulmonary and Critical Care  [id number]

## 2021-06-10 NOTE — PROGRESS NOTES
Order for Durable Medical Equipment was processed and equipment ordered.   DME provider: [facility]  Date Faxed: 04/11/17  Ordering Provider: SUZANNA  Equipment ordered: SUPPLIES

## 2021-06-10 NOTE — TELEPHONE ENCOUNTER
Controlled Substance Refill Request  Medication Name:   Requested Prescriptions     Pending Prescriptions Disp Refills     codeine 30 MG tablet 90 tablet 0     Sig: Take 1 tablet (30 mg total) by mouth every 8 (eight) hours as needed for pain.     Date Last Fill: 07/24/20  [facility]  Submit electronically to pharmacy  Controlled Substance Agreement on file:   Encounter-Level CSA Scan Date:    There are no encounter-level csa scan date.        Last office visit:  08/07/19

## 2021-06-10 NOTE — TELEPHONE ENCOUNTER
Refill request for medication: codeine 30 MG tablet   Last visit addressing this medication: Not found  Follow up plan 6  months  Last refill on 7/24/2020, quantity #90   CSA completed 8/15/19      Appointment: None scheduled, following with [facility] regularly      Gabby Zarate RN

## 2021-06-11 NOTE — PATIENT INSTRUCTIONS - HE
Looks like your due for a follow up with cardiology. Their number is [phone].    Let's increase the lisinopril to 20mg.     We want to make sure the back is being appropriately treated rather than just taking narcotics the rest of your life.    I've placed a referral to spine care to get you a more definitive treatment for your back pain so you don't need so much codeine.    Oncology plans to see you in the spring of 2021.

## 2021-06-11 NOTE — PROGRESS NOTES
Chief Complaint   Patient presents with     Medication Management     CSA 8/7/19.       HPI: Patient presents today for medication check.  Continues to take codeine 2-3 times a day for his chronic back pain.  We discussed how opiates are not a good management for back pain long-term last year, but he had just recently been diagnosed with colon cancer and so we decided to hold off until he healed up.  Regarding the colon cancer he is continue to follow with oncology and so far there is no evidence of recurrence.  Plan is for yearly surveillance right now.    Known history of pulmonary fibrosis.  Patient consulted with pulmonary medicine who did not have many concerns last October.  Patient denies chest pain or shortness of breath.    History of coronary artery disease with end STEMI.  Followed with cardiology last year.  Blood pressure today is uncontrolled.  Patient denies chest pain, palpitations, lightheadedness, dizziness, notes, tingling.  No new or worsening dyspnea on exertion.  Continues with atorvastatin 40 mg.  Taking metoprolol  mg and lisinopril 10 mg.  He is overdue for follow-up with them.    ROS:Review of Systems - negative except for what's listed in the HPI    SH: The Patient's  reports that he quit smoking about 18 years ago. He smoked 0.00 packs per day. He has never used smokeless tobacco. He reports current alcohol use of about 14.0 standard drinks of alcohol per week. He reports current drug use. Drug: Marijuana.      FH: The Patient's family history includes COPD in his mother; Heart disease in his father; No Medical Problems in his sister; Sleep apnea in his brother.     Meds:    Current Outpatient Medications on File Prior to Visit   Medication Sig Dispense Refill     aspirin 81 MG EC tablet Take 1 tablet (81 mg total) by mouth daily.  0     atorvastatin (LIPITOR) 40 MG tablet Take 1 tablet (40 mg total) by mouth at bedtime. 90 tablet 3     ferrous sulfate 325 (65 FE) MG tablet Take 1  "tablet (325 mg total) by mouth daily with breakfast. Take with orange juice. 90 tablet 1     isosorbide dinitrate (ISORDIL) 10 MG tablet TAKE 1 TABLET BY MOUTH 3  TIMES DAILY AT 8:00 AM,  12:00 NOON, AND 6:00 PM. 270 tablet 0     metoprolol succinate (TOPROL-XL) 100 MG 24 hr tablet TAKE 1 TABLET BY MOUTH  DAILY 90 tablet 0     multivitamin (MULTIVITAMIN) per tablet Take 1 tablet by mouth daily.       omeprazole (PRILOSEC) 40 MG capsule TAKE 1 CAPSULE BY MOUTH  DAILY 90 capsule 0     acetaminophen (TYLENOL) 500 MG tablet Take 1-2 tablets (500-1,000 mg total) by mouth every 4 (four) hours as needed.  0     nitroglycerin (NITROSTAT) 0.4 MG SL tablet Place 0.4 mg under the tongue every 5 (five) minutes as needed.              No current facility-administered medications on file prior to visit.        O:  /70   Pulse 64   Ht 5' 5.75\" (1.67 m)   Wt 187 lb (84.8 kg)   SpO2 94%   BMI 30.41 kg/m      Physical Examination:   General appearance - alert, well appearing, and in no distress  Mental status - alert, oriented to person, place, and time  Year-large amounts of cerumen in the right canal.  Lymphatics - no palpable lymphadenopathy, no hepatosplenomegaly  Chest - clear to auscultation, no wheezes, rales or rhonchi, symmetric air entry  Heart - normal rate and regular rhythm, S1 and S2 normal, no murmurs noted  Abdomen - soft, nontender, nondistended, no masses or organomegaly  Neurological - alert, oriented, normal speech, no focal findings or movement disorder noted, no tremors  Musculoskeletal -discomfort with palpation along the lumbar spine and bilateral supraspinous muscles  Extremities - peripheral pulses normal, no pedal edema, no cyanosis  Skin - normal coloration and turgor.      A/P:     Problem List Items Addressed This Visit        Edg Concept Cardiac Problems    Obstructive sleep apnea    Essential hypertension    Relevant Medications    lisinopriL (PRINIVIL,ZESTRIL) 20 MG tablet       Other    " Lumbar Disc Degeneration    Relevant Medications    codeine 30 MG tablet    Other Relevant Orders    Ambulatory referral to Spine Care      Other Visit Diagnoses     Impacted cerumen of right ear    -  Primary    Encounter for hepatitis C screening test for low risk patient        Relevant Orders    Hepatitis C Antibody (Anti-HCV) (Completed)    Screening for HIV (human immunodeficiency virus)        Relevant Orders    HIV Antigen/Antibody Screening [facility] (Completed)    Benign essential hypertension        Relevant Medications    lisinopriL (PRINIVIL,ZESTRIL) 20 MG tablet    Other Relevant Orders    Comprehensive Metabolic Panel (Completed)    History of non-ST elevation myocardial infarction (NSTEMI)        Relevant Orders    Lipid Cascade RANDOM (Completed)        Overdue for follow-up with cardiology.  The number was provided to get this taken care of.  Increase lisinopril to 20 mg daily.  Refill of codeine sent to the pharmacy with updated controlled substance agreement form signed.  Discussed with the patient that is now that he is a year out from his colon cancer surgery we need to dive into this deeper rather than just continue to prescribe opiates.  He expressed understanding.  Spine care referral placed.  Large amounts of cerumen removed with irrigation and then plastic curette.    1. Lumbar Disc Degeneration  - codeine 30 MG tablet; Take 1 tablet (30 mg total) by mouth every 8 (eight) hours as needed for pain.  Dispense: 90 tablet; Refill: 0  - Ambulatory referral to Spine Care    2. Essential hypertension  - lisinopriL (PRINIVIL,ZESTRIL) 20 MG tablet; Take 1 tablet (20 mg total) by mouth daily.  Dispense: 90 tablet; Refill: 0    3. Impacted cerumen of right ear    4. Encounter for hepatitis C screening test for low risk patient  - Hepatitis C Antibody (Anti-HCV)    5. Screening for HIV (human immunodeficiency virus)  - HIV Antigen/Antibody Screening [facility]    6. Obstructive sleep apnea    7. Benign  essential hypertension  - Comprehensive Metabolic Panel    8. History of non-ST elevation myocardial infarction (NSTEMI)  - Lipid Cascade RANDOM        Antonio Cota, CNP

## 2021-06-11 NOTE — TELEPHONE ENCOUNTER
Who is calling:  Patient   Reason for Call:  Caller is needing to know why his medications are net sent over yet. Caller is needing a call back as soon as possible.   Date of last appointment with primary care:   Okay to leave a detailed message: Yes

## 2021-06-11 NOTE — PROGRESS NOTES
Dear Dr. Jeremías Manriquez MD  [address] [place] [address],    Thank you for the opportunity to participate in the care of Christian Edwards.     He is a 61 y.o. y/o male patient who comes to the sleep medicine clinic for follow up.    Patient has a history of diaphragmatic paralysis and uses IVAPS.    Current settings:  Height 68 in  Target VA 5.7 L/min  Current target RR: 14  EPAP: 12 cwp  PS 3-11 cwp  Ti: 1.0 to 2.0  Rise: 500 ms  Trigger: VH  Cycle: VL    He has been using his device more frequently since our last visit.    IPAP: 19.4  RR: 17  VT: 480 mL  MV: 8.7 L/min  % Triggered breaths: 97  % Cycled breaths: 67  AHI 2.5    He met  With the cardiothoracic surgeon and he was offered the possibility of doing surgery. The patient is still thinking about his conversation with Dr. Louis and is not ready to make a decision.      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 2004.  No clear etiology      Diaphragmatic Paralysis     Created by [facility] Annotation: Dec 10 2009 11:10AM - Tammy Flowers: [email or web address]. 2004.  No clear etiology      Feeling Full Before Finishing Meals (Early Satiety)     Created by Conversion      Hyperlipidemia     Created by Conversion      Lactase Deficiency Syndrome     Created by Conversion      Lower Back Pain      "Created by Conversion      Lower Back Pain     Created by Conversion      Lower Back Pain     Created by Conversion        No past surgical history on file.    Social History     Social History     Marital status:      Spouse name: N/A     Number of children: N/A     Years of education: N/A     Occupational History     Not on file.     Social History Main Topics     Smoking status: Former Smoker     Quit date: 1/1/2002     Smokeless tobacco: Never Used     Alcohol use 3.0 oz/week     5 Shots of liquor per week      Comment: drinks daily.     Drug use: No     Sexual activity: Not on file     Other Topics Concern     Not on file     Social History Narrative       Review of Systems:  General: No weight gain, no weight loss  Eyes: No vision changes  ENT: No hearing changes  Cardio: No chest pain, no nocturnal dyspnea  Respiratory: No shortness of breath, no cough  Gastrointestinal: No diarrhea, no constipation  Genitourinary: No excessive urination  Tegumentary: No rashes  Neurological: No seizures, no loss of consciousness  Endo: No heat or cold intolerance.    Current Outpatient Prescriptions   Medication Sig Dispense Refill     aspirin 325 MG tablet Take 325 mg by mouth daily.       atorvastatin (LIPITOR) 20 MG tablet TAKE 1 TABLET BY MOUTH AT BEDTIME 90 tablet 2     codeine 30 MG tablet Take 1 tablet (30 mg total) by mouth every 12 (twelve) hours as needed for pain. 60 tablet 0     metoprolol-hydrochlorothiazide (LOPRESSOR HCT) 100-25 mg per tablet TAKE 1 TABLET BY MOUTH DAILY 90 tablet 0     multivitamin (MULTIVITAMIN) per tablet Take 1 tablet by mouth daily.       omeprazole (PRILOSEC) 40 MG capsule TAKE 1 CAPSULE(40 MG) BY MOUTH DAILY 90 capsule 0     No current facility-administered medications for this visit.        No Known Allergies    Physical Exam:  Pulse 81  Ht 5' 6\" (1.676 m)  Wt 192 lb (87.1 kg)  SpO2 95%  BMI 30.99 kg/m2  BMI:Body mass index is 30.99 kg/(m^2).   GEN: NAD, " obese  Neurological: Alert, oriented to time, place, and person.  Psych: normal mood, normal affect     Labs/Studies:     I reviewed the efficacy and compliance report from his device.     Lab Results   Component Value Date    WBC 9.7 12/20/2016    HGB 16.5 12/20/2016    HCT 48.3 12/20/2016    MCV 96 12/20/2016     12/20/2016         Chemistry        Component Value Date/Time     12/20/2016 1215    K 4.2 12/20/2016 1215    CL 99 12/20/2016 1215    CO2 27 12/20/2016 1215    BUN 10 12/20/2016 1215    CREATININE 0.76 12/20/2016 1215     12/20/2016 1215        Component Value Date/Time    CALCIUM 10.1 12/20/2016 1215    ALKPHOS 148 (H) 12/20/2016 1215    AST 58 (H) 12/20/2016 1215    ALT 47 (H) 12/20/2016 1215    BILITOT 0.6 12/20/2016 1215              Assessment and Plan:  In summary Christian Edwards is a 61 y.o. year old male who is here for follow up.    1. Diaphragmatic paralysis.  He is using his device more consistently.  The percentage of triggered and cycled breaths is slightly lower than our previous visit but the decline is not very sharp.  Encouraged patient to continue with current settings and use his device.    We counseled the patient on the importannce of using his IVAPS device every night and the risks of not treating sleep apnea.      Patient verbalized understanding of these issues, agrees with the plan and all questions were answered today. Patient was given an opportuntity to voice any other symptoms or concerns not listed above. Patient did not have any other symptoms or concerns.      Patient told to return in 6 months.     MD JOAQUIM Telles Board Certified in Internal Medicine and Sleep Medicine  [facility].

## 2021-06-11 NOTE — TELEPHONE ENCOUNTER
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

## 2021-06-12 NOTE — TELEPHONE ENCOUNTER
Please call the patient.  At his last office visit on 9/3/2020 we discussed that opiates are not a good long-term solution for his chronic back pain and that in order to continue prescribing we had to start working with the spine clinic to come up with a permanent solution.  He had held off because of his colon cancer diagnosis, but now it is time to address this.  Referral was placed, but I do not see any evidence of making that appointment yet.    Once the appointment is made, I will go ahead and send another months refill.    Antonio Cota, CNP

## 2021-06-12 NOTE — TELEPHONE ENCOUNTER
Left message to call back for: Pt.  Information to relay to patient:  Information below.     Meliza Vaughn, CMA

## 2021-06-12 NOTE — TELEPHONE ENCOUNTER
Left message to call back for: patient  Information to relay to patient:  See message below from  Antonio Cota

## 2021-06-12 NOTE — TELEPHONE ENCOUNTER
Patient Returning Call  Reason for call:  Patient calling back  Information relayed to patient:  Relayed below message from the provider  Patient has additional questions:  Yes  If YES, what are your questions/concerns:  Patient reports he has been to multiple doctors and therapists. Does not want to go through injections or anything like that after seeing how it has affected some of his friends. Reports he does not want to go to the Spine clinic @ this time. Reports he just wants a refill of this medication for his leg pain which he reports is getting worse. Please advise and call patient asap.  Okay to leave a detailed message?: Yes

## 2021-06-12 NOTE — TELEPHONE ENCOUNTER
Controlled Substance Refill Request  Medication Name:   Requested Prescriptions     Pending Prescriptions Disp Refills     codeine 30 MG tablet 90 tablet 0     Sig: Take 1 tablet (30 mg total) by mouth every 8 (eight) hours as needed for pain.     Date Last Fill: 09/03/20  [facility]  Submit electronically to pharmacy  Controlled Substance Agreement on file:   Encounter-Level CSA Scan Date:    There are no encounter-level csa scan date.        Last office visit:

## 2021-06-12 NOTE — TELEPHONE ENCOUNTER
If that's how he feels, then I'll work on tapering off. Rx sent for 75 tablets to last the month. He can always follow up with his PCP Dr. Manriquez if he wants, but that's where I stand.    Antonio Cota, CNP

## 2021-06-13 NOTE — TELEPHONE ENCOUNTER
Fax in from [email or web address].    To support kristina use of opioid medications, [facility] will no longer dispense more than a 30-day supply of opioid medication at one time.       Codeine 30mg tablet dated 12/4/20 has been reduced from #70 to #60. Please submit new prescriptions for each fill electronically.

## 2021-06-13 NOTE — TELEPHONE ENCOUNTER
Who is calling:  Milton  Reason for Call:  Patient has a CPAP machine. He will soon need to replace CPAP supplies but the original lung doctor is no longer in practice in the [place].  Would Jeremías Manriquez MD or Antonio Cota be willing to take over order these supplies for him?  [facility] does his CPAP supplies.   Please call patient with answer from provider(s).   Date of last appointment with primary care:  9/3/20    Okay to leave a detailed message: Yes

## 2021-06-13 NOTE — TELEPHONE ENCOUNTER
CPAP supplies come from sleep medicine.  I do not know the first thing about what his CPAP machine needs.  I did place a referral to get him connected with a new sleep medicine physician and they should be able to take over supplies from there.    Antonio Cota, CNP

## 2021-06-13 NOTE — TELEPHONE ENCOUNTER
Please see about having Milton come in mid December for med check/recheck labs.    Antonio Cota, CNP

## 2021-06-13 NOTE — PROGRESS NOTES
Assessment and Plan:     Patient has been advised of split billing requirements and indicates understanding: Yes  1. Encounter for Medicare annual wellness exam    2. Obstructive sleep apnea  Referred back to sleep medicine.  Imperative for good MURALI control in the history of his coronary artery disease.    3. Coronary artery disease involving native coronary artery of native heart without angina pectoris  No recurrence of chest pain.  Overdue for follow-up with cardiology.  He says that they have been trying to reach out to him.  He has their contact information.    4. Essential hypertension  Uncontrolled.  Increase lisinopril to 40 mg.  Recheck at follow-up with cardiology.  - lisinopriL (PRINIVIL,ZESTRIL) 40 MG tablet; Take 1 tablet (40 mg total) by mouth daily.  Dispense: 90 tablet; Refill: 0    5. Alcoholic Hepatitis  Encouraged alcohol cessation.  Recheck LFTs.  - Comprehensive Metabolic Panel    6. H/O non-ST elevation myocardial infarction (NSTEMI)  Due for follow-up with cardiology  - Comprehensive Metabolic Panel    7. Hepatic steatosis  Encouraged alcohol cessation  - Comprehensive Metabolic Panel    8. Personal history of DVT (deep vein thrombosis)  No recurrence    9. Hyperlipidemia, unspecified hyperlipidemia type    10. Screening for prostate cancer  - PSA, Annual Screen (Prostatic-Specific Antigen)    11. Lumbar Disc Degeneration  Had a long conversation with the patient about management of his pain issues.  He needs appropriate attempts for a long-term solution rather than just continually using opiates..  He says he has the contact information for the spine center and will get in contact with him.  Continue cutting down the opiates.  - codeine 30 MG tablet; Take 1 tablet (30 mg total) by mouth 2 (two) times a day as needed for pain.  Dispense: 70 tablet; Refill: 0    The patient's current medical problems were reviewed.    I have had an Advance Directives discussion with the patient.  The  following health maintenance schedule was reviewed with the patient and provided in printed form in the after visit summary:   Health Maintenance   Topic Date Due     Pneumococcal Vaccine: Pediatrics (0 to 5 Years) and At-Risk Patients (6 to 64 Years) (1 of 3 - PCV13) 09/09/1961     CORONARY ARTERY DISEASE FOLLOW-UP  09/09/1974     ZOSTER VACCINES (1 of 2) 09/09/2006     HEPATITIS B VACCINES (2 of 3 - Risk 3-dose series) 07/09/2008     INFLUENZA VACCINE RULE BASED (1) 08/01/2020     MEDICARE ANNUAL WELLNESS VISIT  12/04/2021     LIPID  09/03/2025     ADVANCE CARE PLANNING  12/04/2025     COLORECTAL CANCER SCREENING  08/13/2029     TD 18+ HE  12/04/2030     HEPATITIS C SCREENING  Completed     HIV SCREENING  Completed     TDAP ADULT ONE TIME DOSE  Completed        Subjective:   Chief Complaint: Christian Edwards is an 65 y.o. male here for an Annual Wellness visit.     HPI: Overall things are going fair.  Known history of obstructive sleep apnea needs to get reconnected with sleep medicine.  He says his machine will randomly stop blowing air at times.    Known history of NSTEMI.  No recurrence of chest pain.  No dyspnea on exertion issues.  Blood pressure today shows poor control.  Reports taking his antihypertensives as directed.  Overdue for follow-up with cardiology.    History of DVT.  Not on any chronic anticoagulation.  Due for follow-up with hematology/oncology next spring with repeat CT imaging for surveillance of his colon mucinous adenocarcinoma surgically removed.    Known hepatic steatosis with worsening LFTs.  Due for recheck.  Trying to cut down on alcohol consumption.    Still struggles with chronic lower back issues.  Has not had any significant work-up on this.  He has been using codeine for management of the symptoms.  Despite he did not connect.    Lastly, patient notes numbness issues in his feet.  Intermittent.  Apparently he saw neurology 2-3 years ago for this I have no records available for  review.  Release of information was signed.  He did fall yesterday bending over to get his cat and hit his forehead.  Abrasion is bandaged.    Review of Systems:  Please see above.  The rest of the review of systems are negative for all systems.    Patient Care Team:  Provider, No Primary Care as PCP - General  Epifanio Black MD as Physician (General Surgery)  Bayron Sy MD as Physician (Hematology and Oncology)  Edie Aragon, RN as Oncology Nurse Navigator (Hematology and Oncology)  Brittany James, PharmD as Pharmacist (Pharmacist)  Antonio Cota CNP as Assigned PCP  Katlin Csise MD as Assigned Pulmonology Provider  Bayron Sy MD as Assigned Cancer Care Provider  Claudette Joyce MD as Assigned Heart and Vascular Provider     Patient Active Problem List   Diagnosis     Diaphragmatic Paralysis     Alcoholic Hepatitis     Alcohol abuse     NSTEMI (non-ST elevated myocardial infarction) (H)     Insomnia     Hyperlipidemia     Coronary artery disease due to calcified coronary lesion     Lumbar Disc Degeneration     Lower Back Pain     Abnormal Glucose     Cough     Benign Tubulovillous Adenoma Of The Large Intestine     Lactase Deficiency Syndrome     Obstructive sleep apnea     Hypoventilation     H/O non-ST elevation myocardial infarction (NSTEMI)     Hx of heart artery stent     Anemia     Coronary artery disease involving native coronary artery of native heart without angina pectoris     Dyspnea on exertion     Essential hypertension     Dyslipidemia     Pulmonary fibrosis (H)     Other acute pulmonary embolism without acute cor pulmonale (H)     Deep vein thrombosis (DVT) of popliteal vein of left lower extremity, unspecified chronicity (H)     Hypomagnesemia     Unstable angina (H)     Primary adenocarcinoma of ascending colon (H)     Ischemic cardiomyopathy     Hepatic steatosis     Personal history of DVT (deep vein thrombosis)     Past Medical History:   Diagnosis Date     Abnormal  CT of the abdomen 8/12/2019    Added automatically from request for surgery [id number]     Acute chest pain 8/12/2019     Acute Myocardial Infarction     Created by Conversion      Alcoholic Hepatitis     Created by Conversion      Angioedema     Created by Conversion      Benign Tubulovillous Adenoma Of The Large Intestine     Created by Conversion      Coronary Artery Disease     Created by Conversion [facility] Annotation: Jun 9 2008  3:15PM - Tammy Flowers: [place] May 2002      Cough     Created by Conversion      Diaphragmatic Paralysis     Created by Conversion [facility] Annotation: Dec 10 2009 11:10AM - Tammy Flowers: [email or web address].  No clear etiology      DVT (deep venous thrombosis) (H)      Feeling Full Before Finishing Meals (Early Satiety)     Created by Conversion      Hyperlipidemia     Created by Conversion      Lactase Deficiency Syndrome     Created by Conversion      Lower Back Pain     Created by Conversion      PE (pulmonary thromboembolism) (H)      Pre-operative cardiovascular examination 6/3/2015     Rectal bleeding 8/8/2019     Sleep apnea     CPAP      Past Surgical History:   Procedure Laterality Date     CARDIAC SURGERY      five stents     COLECTOMY N/A 8/19/2019    Procedure: RIGHT COLECTOMY, OPEN AND UMBILICAL HERNIA REPAIR;  Surgeon: Epifanio Black MD;  [facility];  Service: General     COLONOSCOPY N/A 8/13/2019    Procedure: COLONOSCOPY WITH BIOPSY;  Surgeon: Antonoi Rajput MD;  Location: [facility] OR;  Service: Gastroenterology     CV CORONARY ANGIOGRAM N/A 8/12/2019    Procedure: Coronary Angiogram;  Surgeon: Eddie Rocha MD;  Location: [facility];  Service: Cardiology     CV LEFT HEART CATHETERIZATION WO LEFT VETRICULOGRAM Left 8/12/2019    Procedure: Left Heart Catheterization Without Left Ventriculogram;  Surgeon: Eddie Rocha MD;  Location: [facility];  Service: Cardiology     IR IVC FILTER PLACEMENT  8/9/2019  "    IR REMOVAL IVC FILTER  10/4/2019     PICC AND MIDLINE TEAM LINE INSERTION  2019          stemi        Family History   Problem Relation Age of Onset     Sleep apnea Brother      COPD Mother      Heart disease Father      No Medical Problems Sister       Social History     Socioeconomic History     Marital status:      Spouse name: Not on file     Number of children: Not on file     Years of education: Not on file     Highest education level: Not on file   Occupational History     Not on file   Social Needs     Financial resource strain: Not on file     Food insecurity     Worry: Not on file     Inability: Not on file     Transportation needs     Medical: Not on file     Non-medical: Not on file   Tobacco Use     Smoking status: Former Smoker     Packs/day: 0.00     Quit date: 2002     Years since quittin.9     Smokeless tobacco: Never Used   Substance and Sexual Activity     Alcohol use: Yes     Alcohol/week: 14.0 standard drinks     Types: 14 Shots of liquor per week     Comment: drinks daily. 6-8 drinks per day/quit 20     Drug use: Yes     Types: Marijuana     Comment: \"once in a while\"     Sexual activity: Not Currently   Lifestyle     Physical activity     Days per week: Not on file     Minutes per session: Not on file     Stress: Not on file   Relationships     Social connections     Talks on phone: Not on file     Gets together: Not on file     Attends Rastafari service: Not on file     Active member of club or organization: Not on file     Attends meetings of clubs or organizations: Not on file     Relationship status: Not on file     Intimate partner violence     Fear of current or ex partner: Not on file     Emotionally abused: Not on file     Physically abused: Not on file     Forced sexual activity: Not on file   Other Topics Concern     Not on file   Social History Narrative     Not on file      Current Outpatient Medications   Medication Sig Dispense Refill     aspirin 81 " "MG EC tablet Take 1 tablet (81 mg total) by mouth daily.  0     atorvastatin (LIPITOR) 40 MG tablet Take 1 tablet (40 mg total) by mouth at bedtime. 90 tablet 0     codeine 30 MG tablet Take 1 tablet (30 mg total) by mouth 2 (two) times a day as needed for pain. 70 tablet 0     isosorbide dinitrate (ISORDIL) 10 MG tablet TAKE 1 TABLET BY MOUTH 3  TIMES DAILY AT 8:00 AM,  12:00 NOON, AND 6:00 PM. 270 tablet 3     lisinopriL (PRINIVIL,ZESTRIL) 40 MG tablet Take 1 tablet (40 mg total) by mouth daily. 90 tablet 0     metoprolol succinate (TOPROL-XL) 100 MG 24 hr tablet TAKE 1 TABLET BY MOUTH  DAILY 90 tablet 2     multivitamin (MULTIVITAMIN) per tablet Take 1 tablet by mouth daily.       omeprazole (PRILOSEC) 40 MG capsule Take 1 capsule (40 mg total) by mouth daily. 90 capsule 0     nitroglycerin (NITROSTAT) 0.4 MG SL tablet Place 0.4 mg under the tongue every 5 (five) minutes as needed.              No current facility-administered medications for this visit.       Objective:   Vital Signs:   Visit Vitals  /88   Pulse 67   Ht 5' 5\" (1.651 m)   Wt 186 lb (84.4 kg)   SpO2 96%   BMI 30.95 kg/m           VisionScreening:  No exam data present     PHYSICAL EXAM   General appearance - alert, well appearing, and in no distress  Mental status - alert, oriented to person, place, and time, normal mood, behavior, speech, dress, motor activity, and thought processes  Eyes - pupils equal and reactive, extraocular eye movements intact  Ears - bilateral TM's and external ear canals normal  Nose - normal and patent, no erythema, discharge or polyps  Mouth - mucous membranes moist, pharynx normal without lesions  Neck - supple, no significant adenopathy, carotids upstroke normal bilaterally, no bruits  Lymphatics - no palpable lymphadenopathy, no hepatosplenomegaly  Chest - clear to auscultation, no wheezes, rales or rhonchi, symmetric air entry  Heart - normal rate and regular rhythm, S1 and S2 normal, no murmurs noted  Abdomen " - soft, nontender, nondistended, no masses or organomegaly  Back exam - full range of motion, no tenderness, palpable spasm or pain on motion  Neurological - alert, oriented, normal speech, no focal findings or movement disorder noted, neck supple without rigidity, cranial nerves II through XII intact, motor and sensory grossly normal bilaterally, normal muscle tone, no tremors, strength 5/5. Decreased sensation to gross touch along the feet  Musculoskeletal - slight stooped posture. Gait otherwise normal  Extremities - peripheral pulses normal, no pedal edema, no clubbing or cyanosis  Skin - abrasion on the forehead. Bandaged. No infection.      Assessment Results 12/4/2020   Activities of Daily Living No help needed   Instrumental Activities of Daily Living No help needed   Get Up and Go Score 12 seconds or more   Mini Cog Total Score 5   Some recent data might be hidden     A Mini-Cog score of 0-2 suggests the possibility of dementia, score of 3-5 suggests no dementia    Identified Health Risks:     The patient was provided with suggestions to help him develop a healthy physical lifestyle.   He is at risk for lack of exercise and has been provided with information to increase physical activity for the benefit of his well-being.  The patient reports that he drinks more than one alcoholic drink per day but denies binge or excessive drinking. He was counseled and given information about possible harmful effects of excessive alcohol intake.  The patient was counseled and encouraged to consider modifying their diet and eating habits. He was provided with information on recommended healthy diet options.  He is at risk for falling and has been provided with information to reduce the risk of falling at home.  Information regarding advance directives (living smith), including where he can download the appropriate form, was provided to the patient via the AVS.     Antonio Cota, ANALIA

## 2021-06-13 NOTE — TELEPHONE ENCOUNTER
Refill Approved    Rx renewed per Medication Renewal Policy. Medication was last renewed on 7/29/119.    Cindy Tadeo, [facility] Connection Triage/Med Refill 12/2/2020     Requested Prescriptions   Pending Prescriptions Disp Refills     omeprazole (PRILOSEC) 40 MG capsule 90 capsule 0     Sig: Take 1 capsule (40 mg total) by mouth daily.       GI Medications Refill Protocol Passed - 12/2/2020  1:25 PM        Passed - PCP or prescribing provider visit in last 12 or next 3 months.     Last office visit with prescriber/PCP: 9/3/2020 Antonio Cota CNP OR same dept: 9/3/2020 Antonio Cota CNP OR same specialty: 9/3/2020 Antonio Cota CNP  Last physical: Visit date not found Last MTM visit: Visit date not found   Next visit within 3 mo: Visit date not found  Next physical within 3 mo: Visit date not found  Prescriber OR PCP: Antonio Cota CNP  Last diagnosis associated with med order: 1. GERD (gastroesophageal reflux disease)  - omeprazole (PRILOSEC) 40 MG capsule; Take 1 capsule (40 mg total) by mouth daily.  Dispense: 90 capsule; Refill: 0    If protocol passes may refill for 12 months if within 3 months of last provider visit (or a total of 15 months).

## 2021-06-13 NOTE — TELEPHONE ENCOUNTER
Left message to call back for: Pt.   Information to relay to patient:  Does pt need a refill of supplies or just wondering if Antonio can take over for future reference?    Meliza Vaughn, CMA

## 2021-06-13 NOTE — TELEPHONE ENCOUNTER
RN cannot approve Refill Request    RN can NOT refill this medication Protocol failed and NO refill given. Last office visit: 8/7/2019 Jeremías Manriquez MD Last Physical: Visit date not found Last MTM visit: Visit date not found Last visit same specialty: 9/3/2020 Antonio Cota CNP.  Next visit within 3 mo: Visit date not found  Next physical within 3 mo: Visit date not found      Cindy Tadeo, [facility] Connection Triage/Med Refill 11/12/2020    Requested Prescriptions   Pending Prescriptions Disp Refills     isosorbide dinitrate (ISORDIL) 10 MG tablet [Pharmacy Med Name: ISOSORBIDE DINITRATE  10MG  TAB] 270 tablet 3     Sig: TAKE 1 TABLET BY MOUTH 3  TIMES DAILY AT 8:00 AM,  12:00 NOON, AND 6:00 PM.       Isosorbide Refill Protocol Failed - 11/9/2020  4:31 AM        Failed - Visit with PCP or prescribing provider visit in last 6 months or next 3 months     Last office visit with prescriber/PCP: Visit date not found OR same dept: Visit date not found OR same specialty: 9/3/2020 Antonio Cota CNP Last physical: Visit date not found Last MTM visit: Visit date not found     Next appt within 3 mo: Visit date not found  Next physical within 3 mo: Visit date not found  Prescriber OR PCP: Jeremías Manriquez MD  Last diagnosis associated with med order: 1. NSTEMI (non-ST elevated myocardial infarction) (H)  - isosorbide dinitrate (ISORDIL) 10 MG tablet [Pharmacy Med Name: ISOSORBIDE DINITRATE  10MG  TAB]; TAKE 1 TABLET BY MOUTH 3  TIMES DAILY AT 8:00 AM,  12:00 NOON, AND 6:00 PM.  Dispense: 270 tablet; Refill: 3    If protocol passes may refill for 6 months if within 3 months of last provider visit (or a total of 9 months).              Passed - Blood pressure filed in past 12 months     BP Readings from Last 1 Encounters:   09/03/20 152/70

## 2021-06-13 NOTE — TELEPHONE ENCOUNTER
Antonio signed.     Faxed and placed in to be scanned.     Copy was made? n/a.    Meliza Vaughn, [facility]

## 2021-06-13 NOTE — TELEPHONE ENCOUNTER
Called Milton and notified him of Antonio's message. Pt verifies understanding and will wait for sleep medicines call.     Meliza Vaughn, CMA

## 2021-06-13 NOTE — TELEPHONE ENCOUNTER
Milton was called to let him know that after review of his labs that Dr. Sy wants him to take oral potassium.  A prescription was sent to his pharmacy.  Patient to take daily.  Patient to follow up in March with labs and an appointment with Dr. Sy.  Patient was sent to scheduling.

## 2021-06-13 NOTE — TELEPHONE ENCOUNTER
Refill Approved    Rx renewed per Medication Renewal Policy. Medication was last renewed on 9/3/20 .    Cindy Tadeo, [facility] Connection Triage/Med Refill 11/20/2020     Requested Prescriptions   Pending Prescriptions Disp Refills     metoprolol succinate (TOPROL-XL) 100 MG 24 hr tablet [Pharmacy Med Name: METOPROLOL SUCC ER 100MG TABLET] 90 tablet 3     Sig: TAKE 1 TABLET BY MOUTH  DAILY       Beta-Blockers Refill Protocol Failed - 11/20/2020  4:12 AM        Failed - PCP or prescribing provider visit in past 12 months or next 3 months     Last office visit with prescriber/PCP: 8/7/2019 Jeremías Manriquez MD OR same dept: Visit date not found OR same specialty: 9/3/2020 Antonio Cota CNP  Last physical: Visit date not found Last MTM visit: Visit date not found   Next visit within 3 mo: Visit date not found  Next physical within 3 mo: Visit date not found  Prescriber OR PCP: Jeremías Manriquez MD  Last diagnosis associated with med order: 1. Essential hypertension  - metoprolol succinate (TOPROL-XL) 100 MG 24 hr tablet [Pharmacy Med Name: METOPROLOL SUCC ER 100MG TABLET]; TAKE 1 TABLET BY MOUTH  DAILY  Dispense: 90 tablet; Refill: 3    If protocol passes may refill for 12 months if within 3 months of last provider visit (or a total of 15 months).             Passed - Blood pressure filed in past 12 months     BP Readings from Last 1 Encounters:   09/03/20 152/70

## 2021-06-13 NOTE — TELEPHONE ENCOUNTER
Medication Request  Medication name: Pantoprazole 40 mg, one daily, #90  Requested Pharmacy: [facility] [place]   Reason for request: Current medication  When did you use medication last?:  Yesterday  Patient offered appointment:  patient declined  Okay to leave a detailed message: yes

## 2021-06-13 NOTE — PROGRESS NOTES
Patient was called to let him know that his potassium continues to be low.  He was called by his primary with a low potassium last week and was encouraged to eat potasium rich foods.  Labs were faxed to primary MD.

## 2021-06-13 NOTE — TELEPHONE ENCOUNTER
Patient Returning Call  Reason for call:  Patient called back.  Information relayed to patient:  Informed of message listed below.  Patient states he needs orders for new CPAP supplies, AND also needs a provider to be his primary so the [facility] will send a form to his new primary provider.  Please address today.  Patient has additional questions:  Yes  If YES, what are your questions/concerns:  As above.  Okay to leave a detailed message?: Yes

## 2021-06-13 NOTE — TELEPHONE ENCOUNTER
I do not see that PCP ordered this Rx, and patient has not seen Dr. Manriquez in quite some time actually.     Called patient to schedule an appt, pt stated Antonio Cota, ANALIA might be taking this refill over and that he might be switching his care to [facility] as well, seeing as it is a closer drive. Patient verbalized that it was okay for me to disregard this request and remove Dr. Manriquez as his primary care physician. DONE.

## 2021-06-13 NOTE — TELEPHONE ENCOUNTER
RN cannot approve Refill Request    RN can NOT refill this medication Protocol failed and NO refill given. Last office visit: 8/7/2019 Jeremías Manriquez MD Last Physical: Visit date not found Last MTM visit: Visit date not found Last visit same specialty: 9/3/2020 Antonio Cota CNP.  Next visit within 3 mo: Visit date not found  Next physical within 3 mo: Visit date not found      Cindy Tadeo, [facility] Connection Triage/Med Refill 11/20/2020    Requested Prescriptions   Pending Prescriptions Disp Refills     atorvastatin (LIPITOR) 40 MG tablet 90 tablet 0     Sig: Take 1 tablet (40 mg total) by mouth at bedtime.       Statins Refill Protocol (Hmg CoA Reductase Inhibitors) Failed - 11/18/2020  3:28 PM        Failed - PCP or prescribing provider visit in past 12 months      Last office visit with prescriber/PCP: 8/7/2019 Jeremías Manriquez MD OR same dept: Visit date not found OR same specialty: 9/3/2020 Antonio Cota CNP  Last physical: Visit date not found Last MTM visit: Visit date not found   Next visit within 3 mo: Visit date not found  Next physical within 3 mo: Visit date not found  Prescriber OR PCP: Jeremías Manriquez MD  Last diagnosis associated with med order: 1. Hyperlipidemia  - atorvastatin (LIPITOR) 40 MG tablet; Take 1 tablet (40 mg total) by mouth at bedtime.  Dispense: 90 tablet; Refill: 0    2. Essential hypertension  - metoprolol succinate (TOPROL-XL) 100 MG 24 hr tablet; Take 1 tablet (100 mg total) by mouth daily.  Dispense: 90 tablet; Refill: 0    If protocol passes may refill for 12 months if within 3 months of last provider visit (or a total of 15 months).                metoprolol succinate (TOPROL-XL) 100 MG 24 hr tablet 90 tablet 0     Sig: Take 1 tablet (100 mg total) by mouth daily.       Beta-Blockers Refill Protocol Failed - 11/18/2020  3:28 PM        Failed - PCP or prescribing provider visit in past 12 months or next 3 months     Last office visit  with prescriber/PCP: 8/7/2019 Jeremías Manriquez MD OR same dept: Visit date not found OR same specialty: 9/3/2020 Antonio Cota CNP  Last physical: Visit date not found Last MTM visit: Visit date not found   Next visit within 3 mo: Visit date not found  Next physical within 3 mo: Visit date not found  Prescriber OR PCP: Jeremías Manriquez MD  Last diagnosis associated with med order: 1. Hyperlipidemia  - atorvastatin (LIPITOR) 40 MG tablet; Take 1 tablet (40 mg total) by mouth at bedtime.  Dispense: 90 tablet; Refill: 0    2. Essential hypertension  - metoprolol succinate (TOPROL-XL) 100 MG 24 hr tablet; Take 1 tablet (100 mg total) by mouth daily.  Dispense: 90 tablet; Refill: 0    If protocol passes may refill for 12 months if within 3 months of last provider visit (or a total of 15 months).             Passed - Blood pressure filed in past 12 months     BP Readings from Last 1 Encounters:   09/03/20 152/70

## 2021-06-14 NOTE — TELEPHONE ENCOUNTER
Refill Approved    Rx renewed per Medication Renewal Policy. Medication was last renewed on 11/21/20, last OV 12/4/20.    Essence Batista, [facility] Triage/Med Refill 1/24/2021     Requested Prescriptions   Pending Prescriptions Disp Refills     atorvastatin (LIPITOR) 40 MG tablet [Pharmacy Med Name: ATORVASTATIN  40MG  TAB] 90 tablet 3     Sig: TAKE 1 TABLET BY MOUTH AT  BEDTIME       Statins Refill Protocol (Hmg CoA Reductase Inhibitors) Passed - 1/23/2021  3:44 AM        Passed - PCP or prescribing provider visit in past 12 months      Last office visit with prescriber/PCP: 9/3/2020 Antonio Cota CNP OR same dept: Visit date not found OR same specialty: 9/3/2020 Antonio Cota CNP  Last physical: 12/4/2020 Last MTM visit: Visit date not found   Next visit within 3 mo: Visit date not found  Next physical within 3 mo: Visit date not found  Prescriber OR PCP: Antonio Cota CNP  Last diagnosis associated with med order: 1. Hyperlipidemia  - atorvastatin (LIPITOR) 40 MG tablet [Pharmacy Med Name: ATORVASTATIN  40MG  TAB]; TAKE 1 TABLET BY MOUTH AT  BEDTIME  Dispense: 90 tablet; Refill: 3    If protocol passes may refill for 12 months if within 3 months of last provider visit (or a total of 15 months).

## 2021-06-15 NOTE — TELEPHONE ENCOUNTER
Refill Approved    Rx renewed per Medication Renewal Policy. Medication was last renewed on 12/4/20.    Kiran Olson, [facility] Triage/Med Refill 2/5/2021     Requested Prescriptions   Pending Prescriptions Disp Refills     lisinopriL (PRINIVIL,ZESTRIL) 40 MG tablet [Pharmacy Med Name: LISINOPRIL  40MG  TAB] 90 tablet 3     Sig: TAKE 1 TABLET BY MOUTH  DAILY       Ace Inhibitors Refill Protocol Passed - 2/5/2021  4:43 AM        Passed - PCP or prescribing provider visit in past 12 months       Last office visit with prescriber/PCP: 9/3/2020 Antonio Cota CNP OR same dept: 9/3/2020 Antonio Cota CNP OR same specialty: 9/3/2020 Antonio Cota CNP  Last physical: 12/4/2020 Last MTM visit: Visit date not found   Next visit within 3 mo: Visit date not found  Next physical within 3 mo: Visit date not found  Prescriber OR PCP: Antonio Cota CNP  Last diagnosis associated with med order: 1. Essential hypertension  - lisinopriL (PRINIVIL,ZESTRIL) 40 MG tablet [Pharmacy Med Name: LISINOPRIL  40MG  TAB]; TAKE 1 TABLET BY MOUTH  DAILY  Dispense: 90 tablet; Refill: 3    If protocol passes may refill for 12 months if within 3 months of last provider visit (or a total of 15 months).             Passed - Serum Potassium in past 12 months     Lab Results   Component Value Date    Potassium 2.8 (LL) 12/09/2020             Passed - Blood pressure filed in past 12 months     BP Readings from Last 1 Encounters:   12/04/20 180/88             Passed - Serum Creatinine in past 12 months     Creatinine   Date Value Ref Range Status   12/09/2020 0.77 0.70 - 1.30 mg/dL Final

## 2021-06-15 NOTE — TELEPHONE ENCOUNTER
Refill Approved    Rx renewed per Medication Renewal Policy. Medication was last renewed on 12/2/20.    Kiran Olson, [facility] Triage/Med Refill 2/25/2021     Requested Prescriptions   Pending Prescriptions Disp Refills     omeprazole (PRILOSEC) 40 MG capsule 90 capsule 0     Sig: Take 1 capsule (40 mg total) by mouth daily.       GI Medications Refill Protocol Passed - 2/24/2021 12:35 PM        Passed - PCP or prescribing provider visit in last 12 or next 3 months.     Last office visit with prescriber/PCP: 9/3/2020 Antonio Cota CNP OR same dept: 9/3/2020 Antonio Cota CNP OR same specialty: 9/3/2020 Antonio Cota CNP  Last physical: 12/4/2020 Last MTM visit: Visit date not found   Next visit within 3 mo: Visit date not found  Next physical within 3 mo: Visit date not found  Prescriber OR PCP: Antonio Cota CNP  Last diagnosis associated with med order: 1. GERD (gastroesophageal reflux disease)  - omeprazole (PRILOSEC) 40 MG capsule; Take 1 capsule (40 mg total) by mouth daily.  Dispense: 90 capsule; Refill: 0    If protocol passes may refill for 12 months if within 3 months of last provider visit (or a total of 15 months).

## 2021-06-15 NOTE — PROGRESS NOTES
[facility] Hematology and Oncology Progress Note    Patient: Christian Edwards  MRN: [id number]  Date of Service: 03/08/2021      Assessment and Plan:    Cancer Staging  Primary adenocarcinoma of ascending colon (H)  Staging form: Colon And Rectum, AJCC 8th Edition  - Clinical: No stage assigned - Unsigned  - Pathologic stage from 9/16/2019: Stage I (pT2, pN0, cM0) - Signed by Bayron Sy MD on 9/16/2019    1.  Colon cancer: Clinically doing well without evidence of recurrence.  We are going to have him get a CT scan now.  We will see him again in a year with repeat imaging.  His tumor marker from today is pending.  We will call him with the result.    ECOG Performance   ECOG Performance Status: 1    Distress Assessment  Distress Assessment Score: No distress    Pain  Currently in Pain: Yes  Location: back and shins    Diagnosis:    1.  Colon cancer: Diagnosed in August 2019: Right-sided, cecum.  Mucinous adenocarcinoma. Grade 2. 11 cm. 32 regional nodes negative. dMMR/MSI-high.     2.  Pulmonary embolism: Diagnosed August 2019.  Provoked.  Small burden of pulmonary emboli limited to the right upper lobe.    Treatment:    Right hemicolectomy August 19, 2019.  No adjuvant therapy was recommended.    Interim History:    Milton is seen in clinic today for a follow-up visit.  In general he is doing okay from last visit usual.  Occasionally will have some frequent stooling.  No abdominal pain, hematochezia, unintentional weight loss, or decreased appetite.  No acute complaints today.    Review of Systems:    Constitutional  Constitutional (WDL): Exceptions to WDL  Fatigue: Fatigue relieved by rest  Neurosensory  Neurosensory (WDL): Exceptions to WDL  Peripheral Sensory Neuropathy: Asymptomatic, loss of deep tendon reflexes or paresthesia  Cardiovascular  Cardiovascular (WDL): All cardiovascular elements are within defined limits  Pulmonary  Respiratory (WDL): Exceptions to WDL  Cough: Mild symptoms, nonprescription  intervention indicated  Dyspnea: Shortness of breath with moderate exertion  Gastrointestinal  Gastrointestinal (WDL): Exceptions to WDL  Diarrhea: Increase of 4 - 6 stools per day over baseline, moderate increase in ostomy output compared to baseline  Genitourinary  Genitourinary (WDL): All genitourinary elements are within defined limits  Integumentary  Integumentary (WDL): Exceptions to WDL  Flushing: Asymptomatic, clinical or diagnostic observations only, intervention not indicated  Patient Coping  Patient Coping: Accepting  Accompanied by  Accompanied by: Alone    Past History:    Past Medical History:   Diagnosis Date     Abnormal CT of the abdomen 8/12/2019    Added automatically from request for surgery [id number]     Acute chest pain 8/12/2019     Acute Myocardial Infarction     Created by Conversion      Alcoholic Hepatitis     Created by Conversion      Angioedema     Created by Conversion      Benign Tubulovillous Adenoma Of The Large Intestine     Created by Conversion      Coronary Artery Disease     Created by Conversion [facility] Annotation: Jun 9 2008  3:15PM - Tammy Flowers: MI May 2002      Cough     Created by Conversion      Diaphragmatic Paralysis     Created by Conversion [facility] Annotation: Dec 10 2009 11:10AM - Tammy Flowers: [email or web address]. 2004.  No clear etiology      DVT (deep venous thrombosis) (H)      Feeling Full Before Finishing Meals (Early Satiety)     Created by Conversion      Hyperlipidemia     Created by Conversion      Lactase Deficiency Syndrome     Created by Conversion      Lower Back Pain     Created by Conversion      PE (pulmonary thromboembolism) (H)      Pre-operative cardiovascular examination 6/3/2015     Rectal bleeding 8/8/2019     Sleep apnea     CPAP     Physical Exam:    Recent Vitals 3/8/2021   Height -   Weight 186 lbs 6 oz   BSA (m2) 1.97 m2   /80   Pulse 86   Temp 98.1   Temp src 1   SpO2 93   Some recent data might be hidden     General: patient  appears stated age of 65 y.o.. Nontoxic and in no distress.   HEENT: Head: atraumatic, normocephalic. Sclerae anicteric.  Chest:  Normal respiratory effort  Cardiac:  No edema.  Regular rate and rhythm.  No murmur appreciated.  Abdomen: abdomen is non-distended  Extremities: normal tone and muscle bulk.  Skin: no lesions or rash. Warm and dry.   CNS: alert and oriented. Grossly non-focal.   Psychiatric: normal mood and affect.     Lab Results:    Recent Results (from the past 168 hour(s))   Comprehensive Metabolic Panel   Result Value Ref Range    Sodium 142 136 - 145 mmol/L    Potassium 3.6 3.5 - 5.0 mmol/L    Chloride 103 98 - 107 mmol/L    CO2 23 22 - 31 mmol/L    Anion Gap, Calculation 16 5 - 18 mmol/L    Glucose 110 70 - 125 mg/dL    BUN 8 8 - 22 mg/dL    Creatinine 0.75 0.70 - 1.30 mg/dL    GFR MDRD Af Amer >60 >60 mL/min/1.73m2    GFR MDRD Non Af Amer >60 >60 mL/min/1.73m2    Bilirubin, Total 0.8 0.0 - 1.0 mg/dL    Calcium 9.1 8.5 - 10.5 mg/dL    Protein, Total 7.3 6.0 - 8.0 g/dL    Albumin 3.9 3.5 - 5.0 g/dL    Alkaline Phosphatase 188 (H) 45 - 120 U/L    AST 93 (H) 0 - 40 U/L    ALT 54 (H) 0 - 45 U/L   HM1 (CBC with Diff)   Result Value Ref Range    WBC 6.5 4.0 - 11.0 thou/uL    RBC 4.71 4.40 - 6.20 mill/uL    Hemoglobin 14.5 14.0 - 18.0 g/dL    Hematocrit 43.4 40.0 - 54.0 %    MCV 92 80 - 100 fL    MCH 30.8 27.0 - 34.0 pg    MCHC 33.4 32.0 - 36.0 g/dL    RDW 14.5 11.0 - 14.5 %    Platelets 173 140 - 440 thou/uL    MPV 9.6 8.5 - 12.5 fL    Neutrophils % 63 50 - 70 %    Lymphocytes % 23 20 - 40 %    Monocytes % 11 (H) 2 - 10 %    Eosinophils % 2 0 - 6 %    Basophils % 1 0 - 2 %    Immature Granulocyte % 1 (H) <=0 %    Neutrophils Absolute 4.1 2.0 - 7.7 thou/uL    Lymphocytes Absolute 1.5 0.8 - 4.4 thou/uL    Monocytes Absolute 0.7 0.0 - 0.9 thou/uL    Eosinophils Absolute 0.1 0.0 - 0.4 thou/uL    Basophils Absolute 0.1 0.0 - 0.2 thou/uL    Immature Granulocyte Absolute 0.0 <=0.0 thou/uL     Imaging:    No  results found.      Signed by: Bayron Sy MD

## 2021-06-16 PROBLEM — K76.0 HEPATIC STEATOSIS: Status: ACTIVE | Noted: 2020-09-04

## 2021-06-16 PROBLEM — I42.9 CARDIOMYOPATHY, UNSPECIFIED TYPE (H): Status: ACTIVE | Noted: 2021-06-03

## 2021-06-16 PROBLEM — E83.42 HYPOMAGNESEMIA: Status: ACTIVE | Noted: 2019-08-12

## 2021-06-16 PROBLEM — I25.5 ISCHEMIC CARDIOMYOPATHY: Status: ACTIVE | Noted: 2019-11-18

## 2021-06-16 PROBLEM — Z95.5 HX OF HEART ARTERY STENT: Status: ACTIVE | Noted: 2018-06-19

## 2021-06-16 PROBLEM — D64.9 ANEMIA: Status: ACTIVE | Noted: 2019-08-07

## 2021-06-16 PROBLEM — Z86.718 PERSONAL HISTORY OF DVT (DEEP VEIN THROMBOSIS): Status: ACTIVE | Noted: 2020-12-04

## 2021-06-16 PROBLEM — F10.20 ALCOHOL DEPENDENCE, UNCOMPLICATED (H): Status: ACTIVE | Noted: 2021-06-03

## 2021-06-16 PROBLEM — C18.2 PRIMARY ADENOCARCINOMA OF ASCENDING COLON (H): Status: ACTIVE | Noted: 2019-09-16

## 2021-06-16 NOTE — PROGRESS NOTES
ASSESSMENT:  1. Hyperlipidemia  - atorvastatin (LIPITOR) 20 MG tablet; Take 1 tablet (20 mg total) by mouth at bedtime.  Dispense: 30 tablet; Refill: 0  - Lipid [place]  - Ambulatory referral to Care Management (Primary Care)  - JIC LAV    2. Essential hypertension  - hydroCHLOROthiazide (HYDRODIURIL) 25 MG tablet; Take 1 tablet (25 mg total) by mouth daily.  Dispense: 90 tablet; Refill: 3  - metoprolol succinate (TOPROL-XL) 100 MG 24 hr tablet; Take 1 tablet (100 mg total) by mouth daily.  Dispense: 90 tablet; Refill: 3  - Comprehensive Metabolic Panel  - Ambulatory referral to Care Management (Primary Care)      PLAN:  He is a blood pressure is not well controlled probably because he has not been taking the medications.  I will change the medications to be able to prescribe separately on hydrochlorothiazide as well as metoprolol hoping that the cost would be reduced that way.  I am also going to go ahead and get some labs for him and encouraged him to follow-up within the next 3 months.  I did also put in the referral for Management to see if there is a way we can help with regards to medication cost.  Labs were also ordered for.  With regard to his lower back pain medication has been refilled he does not this point want to consider following up with the spine care.  Will continue to work on that with him.    Orders Placed This Encounter   Procedures     Lipid [place]     Order Specific Question:   Fasting is required?     Answer:   Yes     Comprehensive Metabolic Panel     [facility]     Ambulatory referral to Care Management (Primary Care)     Referral Priority:   Routine     Referral Reason:   Continuity of Care     Number of Visits Requested:   1     Medications Discontinued During This Encounter   Medication Reason     atorvastatin (LIPITOR) 20 MG tablet Reorder       No Follow-up on file.      CHIEF COMPLAINT:  Chief Complaint   Patient presents with     Follow-up     med check       HISTORY OF PRESENT  ILLNESS:  Christian is a 62 y.o. male presenting to the clinic today for medication check.  He has a history of hypertension as well as hyperlipidemia.  He does have a prescription for Metoprolol with hydrochlorothiazide which unfortunately he noted not to be able to afford it when he is a prescription was finished.  Since then he has not been taking blood pressure medications.  He did note not having any notable symptoms at this point.  He also has history of low back pain with lumbar radiculopathy which he takes codeine for.  That had been recently refilled.  He does have a history of alcohol abuse but on questioning he said that he still drinks but had reduced the amount that he drinks.  He denied any other concerns at this point.  He is noted to have seen the sleep physician as well as pulmonologist for his diaphragmatic paralysis.    REVIEW OF SYSTEMS:   He had denied any chest pain no shortness of breath, he does not have any palpitations.  He has a good appetite and is consistently still being physically active.  Denies any nausea vomiting or diarrhea.  Continues to have lower back pain but continues to be able to deal with it with his medications.  Denies having any urinary symptoms.  Denies any cough.  All other systems are negative.    PFSH:  Reviewed, as below.    History   Smoking Status     Former Smoker     Quit date: 1/1/2002   Smokeless Tobacco     Never Used       Family History   Problem Relation Age of Onset     Sleep apnea Brother      COPD Mother      Heart disease Father      No Medical Problems Sister        Social History     Social History     Marital status:      Spouse name: N/A     Number of children: N/A     Years of education: N/A     Occupational History     Not on file.     Social History Main Topics     Smoking status: Former Smoker     Quit date: 1/1/2002     Smokeless tobacco: Never Used     Alcohol use 3.0 oz/week     5 Shots of liquor per week      Comment: drinks daily.      Drug use: No     Sexual activity: Not on file     Other Topics Concern     Not on file     Social History Narrative       No past surgical history on file.    No Known Allergies    Active Ambulatory Problems     Diagnosis Date Noted     Diaphragmatic Paralysis      Alcoholic Hepatitis      Glucose Intolerance      Alcohol Abuse      Acute Myocardial Infarction      Insomnia      Feeling Full Before Finishing Meals (Early Satiety)      Hyperlipidemia      Coronary Artery Disease      Lumbar Disc Degeneration      Lower Back Pain      Abnormal Glucose      Cough      Benign Tubulovillous Adenoma Of The Large Intestine      Lactase Deficiency Syndrome      Angioedema      Routine general medical examination at a health care facility 06/03/2015     Obstructive sleep apnea 11/09/2015     Hypoventilation 10/10/2016     Resolved Ambulatory Problems     Diagnosis Date Noted     No Resolved Ambulatory Problems     Past Medical History:   Diagnosis Date     Acute Myocardial Infarction      Alcoholic Hepatitis      Angioedema      Benign Tubulovillous Adenoma Of The Large Intestine      Coronary Artery Disease      Coronary Artery Disease      Cough      Diaphragmatic Paralysis      Diaphragmatic Paralysis      Feeling Full Before Finishing Meals (Early Satiety)      Hyperlipidemia      Lactase Deficiency Syndrome      Lower Back Pain      Lower Back Pain      Lower Back Pain        Current Outpatient Prescriptions   Medication Sig Dispense Refill     aspirin 325 MG tablet Take 325 mg by mouth daily.       atorvastatin (LIPITOR) 20 MG tablet Take 1 tablet (20 mg total) by mouth at bedtime. 30 tablet 0     codeine 30 MG tablet Take 1 tablet (30 mg total) by mouth every 12 (twelve) hours as needed for pain. Last refill till seen. 60 tablet 0     multivitamin (MULTIVITAMIN) per tablet Take 1 tablet by mouth daily.       omeprazole (PRILOSEC) 40 MG capsule TAKE 1 CAPSULE(40 MG) BY MOUTH DAILY 90 capsule 0     omeprazole  (PRILOSEC) 40 MG capsule TAKE 1 CAPSULE(40 MG) BY MOUTH DAILY 90 capsule 0     hydroCHLOROthiazide (HYDRODIURIL) 25 MG tablet Take 1 tablet (25 mg total) by mouth daily. 90 tablet 3     metoprolol succinate (TOPROL-XL) 100 MG 24 hr tablet Take 1 tablet (100 mg total) by mouth daily. 90 tablet 3     metoprolol-hydrochlorothiazide (LOPRESSOR HCT) 100-25 mg per tablet TAKE 1 TABLET BY MOUTH DAILY 90 tablet 0     No current facility-administered medications for this visit.        VITALS:  Vitals:    03/26/18 0959 03/26/18 1031   BP: 172/82 160/84   Patient Site: Left Arm    Patient Position: Sitting    Cuff Size: Adult Regular    Pulse: 86    Temp: 98  F (36.7  C)    TempSrc: Oral    SpO2: 96%    Weight: 191 lb (86.6 kg)      Wt Readings from Last 3 Encounters:   03/26/18 191 lb (86.6 kg)   07/10/17 192 lb (87.1 kg)   05/25/17 196 lb (88.9 kg)     Body mass index is 30.83 kg/(m^2).    PHYSICAL EXAM:  General Appearance: Alert, cooperative, no distress, appears stated age, slightly flushed in the face and itching his eyes.  HEENT: Pupils are equal and reactive, extraocular motions is normal.Neck is supple no notable thyromegaly.  External ears are normal.  Lungs: Clear to auscultation bilaterally, respirations unlabored  Heart: Regular rate and rhythm, S1 and S2 normal, no murmur, rub, or gallop  Abdomen: Soft, slightly distended no tenderness no masses.  Musculoskeletal: Normal range of motion. No joint swelling or deformity.   Neurologic:  Alert and oriented times 3.   Psychiatric: Normal mood and affect.    MEDICATIONS:  Current Outpatient Prescriptions   Medication Sig Dispense Refill     aspirin 325 MG tablet Take 325 mg by mouth daily.       atorvastatin (LIPITOR) 20 MG tablet Take 1 tablet (20 mg total) by mouth at bedtime. 30 tablet 0     codeine 30 MG tablet Take 1 tablet (30 mg total) by mouth every 12 (twelve) hours as needed for pain. Last refill till seen. 60 tablet 0     multivitamin (MULTIVITAMIN) per  tablet Take 1 tablet by mouth daily.       omeprazole (PRILOSEC) 40 MG capsule TAKE 1 CAPSULE(40 MG) BY MOUTH DAILY 90 capsule 0     omeprazole (PRILOSEC) 40 MG capsule TAKE 1 CAPSULE(40 MG) BY MOUTH DAILY 90 capsule 0     hydroCHLOROthiazide (HYDRODIURIL) 25 MG tablet Take 1 tablet (25 mg total) by mouth daily. 90 tablet 3     metoprolol succinate (TOPROL-XL) 100 MG 24 hr tablet Take 1 tablet (100 mg total) by mouth daily. 90 tablet 3     metoprolol-hydrochlorothiazide (LOPRESSOR HCT) 100-25 mg per tablet TAKE 1 TABLET BY MOUTH DAILY 90 tablet 0     No current facility-administered medications for this visit.

## 2021-06-17 NOTE — PROGRESS NOTES
On 3/26/18, Dr. Manriquez placed an order for Clinic Care Coordination    The Clinic Care Guide called the patient today at the request of the PCP to discuss possible Clinic Care Coordination enrollment. CCC was described to the patient and immediate needs were discussed. The patient declined enrollment at this time. The patient was provided with contact information for the Clinic Care Guide if his needs change in the future.    I have sent a copy of this note to Dr. Manriquez as an FYI

## 2021-06-18 NOTE — PROGRESS NOTES
Assessment/Plan:        Diagnoses and all orders for this visit:    H/O non-ST elevation myocardial infarction (NSTEMI)  -     Ambulatory referral to Cardiac Rehab    Screen for colon cancer  -     Ambulatory referral for Colonoscopy    Hx of heart artery stent    Bilateral leg pain  -     Ambulatory referral to Vascular Center    Neck pain  -     XR Cervical Spine 2 - 3 VWS; Future; Expected date: 6/19/18    Other orders  -     atorvastatin (LIPITOR) 40 MG tablet;   -     clopidogrel (PLAVIX) 75 mg tablet;   -     nitroglycerin (NITROSTAT) 0.4 MG SL tablet;   I did review the emergency room notes.  He did have an STEMI.  I referred him to cardiac rehabilitation which he noted that he was told to call but have not heard from them.  He does have another stenting planned.  This will be in a month's time.  He can go back to [facility] and will let us know so that we can refer him to see the allergist cardiologist for that.  In the meantime I did encourage him to continue to take his medications.  I also counseled him against alcohol use at this point.  We reviewed his medication fully he does not need any medication refills at this point.  Regarding the bilateral leg pain which I did review again at this point I did refer him to see the vascular physicians for AB index.  Also for the neck pain that he has had with popping sounds, I did get him an x-ray of the neck and will review that following the reports.  I counseled him regarding symptoms that he needs to watch out for for which he needs to be seen back or seen in emergency room.  He declined having colonoscopy but will go ahead and have [facility].        Subjective:    Patient ID: Christian Edwards is a 62 y.o. male.    HPI Comments: Christian Edwards 62-year-old patient who comes in today for follow-up.  He was seen at the emergency room at [facility] following episodes of chest pain that did respond to nitro glycerin.  At the emergency room he  did have an EKG as well as other lab, EKG did not show any elevation of the ST segment.  He did have troponins that was high, and he ended up being sent to the Cath Lab for an angiogram and then he did get stenting ×3.  He does need another stent to be put in but they wanted to do it in stages.  He is continuing to have some exertional dyspnea but otherwise feels better.  He does not have any shortness of breath at rest.  He denies having any dizziness.  He has had concerns of bilateral lower extremity pain.  He had noted that the pain is initially around the bones but now noted that the pain is all over the lower extremity.  He notes that sitting down walking around or being active makes the pain worse the only thing that makes it better is laying down flat.  He denied having any injuries in the past.  We have reviewed that concern initially but there is really no abnormal evaluation results.  He noted continued to have the pain.  He also noted having neck pain which is mild but noted a popping sensation in the neck which sometimes will cause him severe pain.  He denied having any numbness to his upper extremities and denied any weaknesses.  He does have mild headaches.  Does have mild pain to the upper back.  Regarding health maintenance he is due for his colonoscopy and this will be discussed.      The following portions of the patient's history were reviewed and updated as appropriate: allergies, current medications, past family history, past medical history, past social history, past surgical history and problem list.    Review of Systems   Constitutional: Negative for diaphoresis and fatigue.   HENT: Negative.  Negative for congestion.    Respiratory: Positive for cough. Negative for chest tightness and wheezing.    Cardiovascular: Negative for chest pain, palpitations and leg swelling.   Gastrointestinal: Negative.    Genitourinary: Negative.    Musculoskeletal: Positive for neck pain and neck stiffness.  Negative for back pain.   Skin: Negative for rash.   Neurological: Negative for light-headedness and headaches.     Vitals:    06/19/18 1413   BP: 130/70   Patient Site: Left Arm   Patient Position: Sitting   Cuff Size: Adult Large   Pulse: 70   SpO2: 97%   Weight: 182 lb 14.4 oz (83 kg)             Objective:    Physical Exam   Constitutional: He is oriented to person, place, and time. He appears well-developed and well-nourished. No distress.   HENT:   Mouth/Throat: Oropharynx is clear and moist.   Neck: Normal range of motion. Neck supple.   Cardiovascular: Normal rate and regular rhythm.  Exam reveals no friction rub.    No murmur heard.  Pulmonary/Chest: Effort normal.   Abdominal: Soft.   Musculoskeletal: Normal range of motion.   Both lower extremities are normal with no tenderness.  No swelling is noted.  Dorsalis pedis pulsation was barely felt on both sides.   Neurological: He is alert and oriented to person, place, and time.

## 2021-06-18 NOTE — PROGRESS NOTES
Consult; bilateral leg pain, varicose veins; Dr. Manriquez referring. had STEMI 6/7/18. Asa, statin    Pt complains of constaint pain from knees to ankles in the posterior legs bilaterally. Elevation helpful. Pt has not had any ultrasounds or worn compression. Pt says it started over a year ago but progressively getting worse.

## 2021-06-18 NOTE — PATIENT INSTRUCTIONS - HE
Patient Instructions by Antonio Cota CNP at 12/4/2020 11:00 AM     Author: Antonio Cota CNP Service: -- Author Type: Nurse Practitioner    Filed: 12/6/2020 11:01 AM Encounter Date: 12/4/2020 Status: Addendum    : Antonio Cota CNP (Nurse Practitioner)    Related Notes: Original Note by Antonio Cota CNP (Nurse Practitioner) filed at 12/4/2020 11:40 AM       I sent a higher dose of the lisinopril to your pharmacy. New dose is 40mg.    Check back with the nurses in a month for a blood pressure check.      I sent a refill of the codeine. Get connected with spine like we talked about.    If you don't hear from the sleep docs in 2 weeks, let me know.     We'll try to get records from neurology about the numbness/shin issues and go from there.   Patient Education     Your Health Risk Assessment indicates you feel you are not in good physical health.    A healthy lifestyle helps keep the body fit and the mind alert. It helps protect you from disease, helps you fight disease, and helps prevent chronic disease (disease that doesn't go away) from getting worse. This is important as you get older and begin to notice twinges in muscles and joints and a decline in the strength and stamina you once took for granted. A healthy lifestyle includes good healthcare, good nutrition, weight control, recreation, and regular exercise. Avoid harmful substances and do what you can to keep safe. Another part of a healthy lifestyle is stay mentally active and socially involved.    Good healthcare     Have a wellness visit every year.     If you have new symptoms, let us know right away. Don't wait until the next checkup.     Take medicines exactly as prescribed and keep your medicines in a safe place. Tell us if your medicine causes problems.   Healthy diet and weight control     Eat 3 or 4 small, nutritious, low-fat, high-fiber meals a day. Include a variety of fruits, vegetables, and whole-grain foods.     Make sure you  get enough calcium in your diet. Calcium, vitamin D, and exercise help prevent osteoporosis (bone thinning).     If you live alone, try eating with others when you can. That way you get a good meal and have company while you eat it.     Try to keep a healthy weight. If you eat more calories than your body uses for energy, it will be stored as fat and you will gain weight.     Recreation   Recreation is not limited to sports and team events. It includes any activity that provides relaxation, interest, enjoyment, and exercise. Recreation provides an outlet for physical, mental, and social energy. It can give a sense of worth and achievement. It can help you stay healthy.       Patient Education     Exercise for a Healthier Heart  You may wonder how you can improve the health of your heart. If youre thinking about exercise, youre on the right track. You dont need to become an athlete, but you do need a certain amount of brisk exercise to help strengthen your heart. If you have been diagnosed with a heart condition, your doctor may recommend exercise to help stabilize your condition. To help make exercise a habit, choose safe, fun activities.       Be sure to check with your health care provider before starting an exercise program.    Why exercise?  Exercising regularly offers many healthy rewards. It can help you do all of the following:    Improve your blood cholesterol levels to help prevent further heart trouble    Lower your blood pressure to help prevent a stroke or heart attack    Control diabetes, or reduce your risk of getting this disease    Improve your heart and lung function    Reach and maintain a healthy weight    Make your muscles stronger and more limber so you can stay active    Prevent falls and fractures by slowing the loss of bone mass (osteoporosis)    Manage stress better  Exercise tips  Ease into your routine. Set small goals. Then build on them.  Exercise on most days. Aim for a total of 150 or  more minutes of moderate to  vigorous intensity activity each week. Consider 40 minutes, 3 to 4 times a week. For best results, activity should last for 40 minutes on average. It is OK to work up to the 40 minute period over time. Examples of moderate-intensity activity is walking one mile in 15 minutes or 30 to 45 minutes of yard work.  Step up your daily activity level. Along with your exercise program, try being more active throughout the day. Walk instead of drive. Do more household tasks or yard work.  Choose one or more activities you enjoy. Walking is one of the easiest things you can do. You can also try swimming, riding a bike, or taking an exercise class.  Stop exercising and call your doctor if you:    Have chest pain or feel dizzy or lightheaded    Feel burning, tightness, pressure, or heaviness in your chest, neck, shoulders, back, or arms    Have unusual shortness of breath    Have increased joint or muscle pain    Have palpitations or an irregular heartbeat      6502-7775 The [employer]. [address] [place] [address]. All rights reserved. This information is not intended as a substitute for professional medical care. Always follow your healthcare professional's instructions.         Patient Education   Alcohol Use   Many people can enjoy a glass of wine or beer without any negative consequences to their health. According to the Centers for Disease Control and Prevention (CDC), having one or fewer drinks per day for women and two or fewer per day for men is considered moderate drinking.     When people drink more than moderately, it can become concerning. Excessive drinking is defined as consuming 15 drinks or more per week for men and 8 drinks or more per week for women. There are various health problems associated with excessive drinking, which include:    Damage to vital organs like the heart, brain, liver and pancreas    Harm to the digestive tract    Weaken the immune  system    Higher risk for heart disease and cancer       Patient Education   Understanding USDA MyPlate  The USDA (US Department of Agriculture) has guidelines to help you make healthy food choices. These are called MyPlate. MyPlate shows the food groups that make up healthy meals using the image of a place setting. Before you eat, think about the healthiest choices for what to put onto your plate or into your cup or bowl. To learn more about building a healthy plate, visit www.choosemyplate.gov.       The Food Groups    Fruits: Any fruit or 100% fruit juice counts as part of the Fruit Group. Fruits may be fresh, canned, frozen, or dried, and may be whole, cut-up, or pureed. Make half your plate fruits and vegetables.    Vegetables: Any vegetable or 100% vegetable juice counts as a member of the Vegetable Group. Vegetables may be fresh, frozen, canned, or dried. They can be served raw or cooked and may be whole, cut-up, or mashed. Make half your plate fruits and vegetables.     Grains: All foods made from grains are part of the Grains Group. These include wheat, rice, oats, cornmeal, and barley such as bread, pasta, oatmeal, cereal, tortillas, and grits. Grains should be no more than a quarter of your plate. At least half of your grains should be whole grains.    Protein: This group includes meat, poultry, seafood, beans and peas, eggs, processed soy products (like tofu), nuts (including nut butters), and seeds. Make protein choices no more than a quarter of your plate. Meat and poultry choices should be lean or low fat.    Dairy: All fluid milk products and foods made from milk that contain calcium, like yogurt and cheese are part of the Dairy Group. (Foods that have little calcium, such as cream, butter, and cream cheese, are not part of the group.) Most dairy choices should be low-fat or fat-free.    Oils: These are fats that are liquid at room temperature. They include canola, corn, olive, soybean, and sunflower  oil. Foods that are mainly oil include mayonnaise, certain salad dressings, and soft margarines. You should have only 5 to 7 teaspoons of oils a day. You probably already get this much from the food you eat.  Use [employer] to Help Build Your Meals  The SuperTracker can help you plan and track your meals and activity. You can look up individual foods to see or compare their nutritional value. You can get guidelines for what and how much you should eat. You can compare your food choices. And you can assess personal physical activities and see ways you can improve. Go to www.[employer].gov/supertracker/.    0504-2336 The [facility]. [address] [place] [address], [place] [address]. All rights reserved. This information is not intended as a substitute for professional medical care. Always follow your healthcare professional's instructions.           Patient Education   Preventing Falls in the Home  As you get older, falls are more likely. Thats because your reaction time slows. Your muscles and joints may also get stiffer, making them less flexible. Illness, medications, and vision changes can also affect your balance. A fall could leave you unable to live on your own. To make your home safer, follow these tips:    Floors    Put nonskid pads under area rugs.    Remove throw rugs.    Replace worn floor coverings.    Tack carpets firmly to each step on carpeted stairs. Put nonskid strips on the edges of uncarpeted stairs.    Keep floors and stairs free of clutter and cords.    Arrange furniture so there are clear pathways.    Clean up any spills right away.    Bathrooms    Install grab bars in the tub or shower.    Apply nonskid strips or put a nonskid rubber mat in the tub or shower.    Sit on a bath chair to bathe.    Use bathmats with nonskid backing.    Lighting    Keep a flashlight in each room.    Put a nightlight along the pathway between the bedroom and the bathroom.    [phone] [employer],  [employer]. [address] [place] [address]. All rights reserved. This information is not intended as a substitute for professional medical care. Always follow your healthcare professional's instructions.         Patient Education   Understanding Advance Care Planning  Advance care planning is the process of deciding ones own future medical care. It helps ensure that if you cant speak for yourself, your wishes can still be carried out. The plan is a series of legal documents that note a persons wishes. The documents vary by state. Advance care planning may be done when a person has a serious illness that is expected to get worse. It may be done before major surgery. And it can help you and your family be prepared in case of a major illness or injury. Advance care planning helps with making decisions at these times.       A health care proxy is a person who acts as the voice of a patient when the patient cant speak for himself or herself. The name of this role varies by state. It may be called a Durable Medical Power of  or Durable Power of  for Healthcare. It may be called an agent, surrogate, or advocate. Or it may be called a representative or decision maker. It is an official duty that is identified by a legal document. The document also varies by state.    Why Is Advance Care Planning Important?  If a person communicates their healthcare wishes:    They will be given medical care that matches their values and goals.    Their family members will not be forced to make decisions in a crisis with no guidance.  Creating a Plan  Making an advance care plan is often done in 3 steps:    Thinking about ones wishes. To create an advance care plan, you should think about what kind of medical treatment you would want if you lose the ability to communicate. Are there any situations in which you would refuse or stop treatment? Are there therapies you would want or not want? And whom do you want to make  decisions for you? There are many places to learn more about how to plan for your care. Ask your doctor or  for resources.    Picking a health care proxy. This means choosing a trusted person to speak for you only when you cant speak for yourself. When you cannot make medical decisions, your proxy makes sure the instructions in your advance care plan are followed. A proxy does not make decisions based on his or her own opinions. They must put aside those opinions and values if needed, and carry out your wishes.    Filling out the legal documents. There are several kinds of legal documents for advance care planning. Each one tells health care providers your wishes. The documents may vary by state. They must be signed and may need to be witnessed or notarized. You can cancel or change them whenever you wish. Depending on your state, the documents may include a Healthcare Proxy form, Living Will, Durable Medical Power of , Advance Directive, or others.  The Familys Role  The best help a family can give is to support their loved ones wishes. Open and honest communication is vital. Family should express any concerns they have about the patients choices while the patient can still make decisions.    [phone] [employer]. [address] [place] [address]. All rights reserved. This information is not intended as a substitute for professional medical care. Always follow your healthcare professional's instructions.         Also, [employer] offers a free, downloadable health care directive that allows you to share your treatment choices and personal preferences if you cannot communicate your wishes. It also allows you to appoint another person (called a health care agent) to make health care decisions if you are unable to do so. You can download an advance directive by going here: [email or web address]     Patient Education   Personalized  Prevention Plan  You are due for the preventive services outlined below.  Your care team is available to assist you in scheduling these services.  If you have already completed any of these items, please share that information with your care team to update in your medical record.  Health Maintenance   Topic Date Due   ? Pneumococcal Vaccine: Pediatrics (0 to 5 Years) and At-Risk Patients (6 to 64 Years) (1 of 3 - PCV13) 09/09/1961   ? CORONARY ARTERY DISEASE FOLLOW-UP  09/09/1974   ? ZOSTER VACCINES (1 of 2) 09/09/2006   ? HEPATITIS B VACCINES (2 of 3 - Risk 3-dose series) 07/09/2008   ? INFLUENZA VACCINE RULE BASED (1) 08/01/2020   ? MEDICARE ANNUAL WELLNESS VISIT  12/04/2021   ? LIPID  09/03/2025   ? ADVANCE CARE PLANNING  12/04/2025   ? COLORECTAL CANCER SCREENING  08/13/2029   ? TD 18+ HE  12/04/2030   ? HEPATITIS C SCREENING  Completed   ? HIV SCREENING  Completed   ? TDAP ADULT ONE TIME DOSE  Completed

## 2021-06-19 NOTE — PROGRESS NOTES
"[facility] Discharge Summary  Patient Name: Christian Edwards  Date: 8/20/2018  Referral Diagnosis: cervical radiculopathy  Referring provider: Jeremías Manrqiuez*  Visit Diagnosis:   1. Neck problem     2. Neck stiffness     3. Poor posture         Goals:  Pt. will be independent with home exercise program in : 4 weeks  Pt. will improve posture : and demonstrate posture with minimal to no cuing;in sitting;in standing;in 6 weeks  Patient Turn [place]: for driving;for watching traffic;for conversation;with full ROM;with less difficulty;in 6 weeks  Patient will look up / down: for reading;with full ROM;with less difficulty;in 4 weeks  Patient will decrease : NDI score;for improved quality of function;for improved quality of life;in 6 weeks  Pt will: report no reproduction of neck \"snapping\" with any cervical neck movements by 6 weeks.    Patient was seen for 3 visits for physical therapy of cervical radiculopathy from 7/11/18 to 7/23/18 with no follow up appointments.   The patient attended therapy initially, but did not finish the therapy sessions prescribed.  Goals were not fully achieved. Explanation for goals not achieved: Patient did not return to measure goals.  The patient discontinued therapy, did not return.  No further therapy is required at this time.    Therapy will be discontinued at this time.  The patient will need a new referral to resume physical therapy treatment. Please see below for patient's current status.    Thank you for your referral.  Emani Kennedy, PT, DPT  8/20/2018   12:12 PM      [facility] Daily Progress     Patient Name: Christian Edwards  Date: 7/23/2018  Visit #: 3/12  Referral Diagnosis: cervical radiculopathy  Referring provider: Jeremías Manriquez*  Visit Diagnosis:     ICD-10-CM    1. Neck problem R68.89    2. Neck stiffness M43.6    3. Poor posture R29.3        Assessment:     Patient is a 62 y.o. male that presents with signs and symptoms " "consistent with neck stiffness and \"snapping\" secondary to poor posture and neck weakness. Patient demonstrates impairments including decreased cervical range of motion, joint mobility and flexibility, with poor postural awareness and core stability leading to impaired functional mobility. Patient's functional limitations include looking left/right and down without occasional \"snapping\" in his posterior neck.    Today patient reports he feels worse since initial visit, more pain and stiffness in his neck, especially due to the manual therapy. Patient was educated on recovery process and that he may have good and bad days.     HEP/POC compliance is  good .  Patient demonstrates understanding/independence with home program.  Patient is appropriate to continue with skilled physical therapy intervention, as indicated by initial plan of care.    Goal Status:  Pt. will be independent with home exercise program in : 4 weeks  Pt. will improve posture : and demonstrate posture with minimal to no cuing;in sitting;in standing;in 6 weeks  Patient Turn [place]: for driving;for watching traffic;for conversation;with full ROM;with less difficulty;in 6 weeks  Patient will look up / down: for reading;with full ROM;with less difficulty;in 4 weeks  Patient will decrease : NDI score;for improved quality of function;for improved quality of life;in 6 weeks  Pt will: report no reproduction of neck \"snapping\" with any cervical neck movements by 6 weeks.      Plan / Patient Education:     Continue with initial plan of care.  Progress with home program as tolerated.    Plan for next visit: review HEP, scapular strengthening and postural review - theraband exercises, wall push ups, cervical isometrics, manual for increased motion and flexibility, patient may not want to continue therapy    Subjective:     Pain Rating: 3-4  Patient was sore last week, the snapping is the same as initial visit. He is feeling it pretty much all the time but \"it " "isn't quite as bad.\" Patient has been able to get in his exercises in every day.   Patient isn't enjoying therapy and his exercises.     Objective:     Cervical ROM:             Date: 7/11/2018       *Indicate scale AROM AROM AROM   Cervical Flexion 60 excessive protraction       Cervical Extension 40         Right Left Right Left Right Left   Cervical Sidebending 45 44           Cervical Rotation 60 70                 Treatment Today       Patient Education: Patient was educated on continuing plan of care, progress and review of current HEP. Patient educated on importance of consistency with exercise and therapy, as well as activity modification in order to see change and improvements. Patient demonstrated and verbalized understanding.     Manual Therapy:  STM of cervical paraspinals, levators and suboccipitals   Gentle cervical upglides R>L to increase motion and decrease stiffness - patient did report slight ache after treatment, but still no pain - did demonstrate increased clicking after session    Exercises:  Exercise #1: supine chin tuck - hold 5 sec x 10 reps   Comment #1: scapular retraction - hold 5 seconds  Exercise #2: UT and levator stretch - hold 30 sec x 2  Comment #2: pec doorway stretch - hold 30 sec x 2      TREATMENT MINUTES COMMENTS   Evaluation     Self-care/ Home management     Manual therapy 15 STM of cervical paraspinals, levators and suboccipitals   Gentle cervical upglides R>L to increase motion and decrease stiffness - patient did report slight ache after treatment, but still no pain - did demonstrate increased clicking after session   Neuromuscular Re-education     Therapeutic Activity     Therapeutic Exercises 12 See above flowsheet; review of HEP  Arm bike  PNF diagonals   Horizontal abduction  theraband row/extension orange band   Gait training     Modality__________________                Total 27    Blank areas are intentional and mean the treatment did not include these items. "       Emani Kennedy, PT  7/23/2018

## 2021-06-19 NOTE — PROGRESS NOTES
"    [facility] Certification Request    July 11, 2018      Patient: Christian Edwards  MR Number: [id number]  YOB: 1955  Date of Visit: 7/11/2018      Dear Dr. Manriquez,    Thank you for this referral.   We are seeing Christian Edwards for Physical Therapy of cervical neck impairments.    Medicare and/or Medicaid requires physician review and approval of the treatment plan. Please review the plan of care and verify that you agree with the therapy plan of care by co-signing this note.      Plan of Care  Authorization / Certification Start Date: 07/11/18  Authorization / Certification End Date: 10/11/18  Authorization / Certification Number of Visits: 12  Communication with: Referral Source  Patient Related Instruction: Nature of Condition;Treatment plan and rationale;Self Care instruction;Basis of treatment;Body mechanics;Posture;Expected outcome;Next steps  Times per Week: 1-2  Number of Weeks: 6-8  Number of Visits: 12  Therapeutic Exercise: ROM;Stretching;Strengthening  Neuromuscular Reeducation: kinesio tape;posture;TNE;core  Manual Therapy: soft tissue mobilization;myofascial release;joint mobilization;muscle energy  Modalities: TENS    Goals:  Pt. will be independent with home exercise program in : 4 weeks  Pt. will improve posture : and demonstrate posture with minimal to no cuing;in sitting;in standing;in 6 weeks  Patient Turn Head: for driving;for watching traffic;for conversation;with full ROM;with less difficulty;in 6 weeks  Patient will look up / down: for reading;with full ROM;with less difficulty;in 4 weeks  Patient will decrease : NDI score;for improved quality of function;for improved quality of life;in 6 weeks  Pt will: report no reproduction of neck \"snapping\" with any cervical neck movements by 6 weeks.      If you have any questions or concerns, please don't hesitate to call.    Sincerely,      Emani Kennedy, PT  [phone]      Physician recommendation:     ___ Follow " "therapist's recommendation        ___ Modify therapy      *Physician co-signature indicates they certify the need for these services furnished within this plan and while under their care.        [facility]   Cervical Thoracic Initial Evaluation    Patient Name: Christian Edwards  Date of evaluation: 7/11/2018  Referral Diagnosis: Cervical radiculopathy  Referring provider: Jeremías Manriquez*  Visit Diagnosis:     ICD-10-CM    1. Neck problem R68.89    2. Neck stiffness M43.6    3. Poor posture R29.3        Assessment:        Patient is a 62 y.o. male that presents with signs and symptoms consistent with neck stiffness and \"snapping\" secondary to poor posture and neck weakness. Patient demonstrates impairments including decreased cervical range of motion, joint mobility and flexibility, with poor postural awareness and core stability leading to impaired functional mobility. Patient's functional limitations include looking left/right and down without occasional \"snapping\" in his posterior neck. Patient was educated that some noises/clicking in his neck is okay, as long as there are no pain symptoms, which patient did not report during evaluation today. Today patient responded well to manual therapy, reporting slight tightness after session.    Patient educated on and demonstrated understanding of nature of impairment, plan of care, patient role and HEP. Patient compliant with PT and prognosis is good. Patient would benefit from skilled PT to progress and improve above impairments.      The POC is dynamic and will be modified on an ongoing basis.  Patient will return to clinic if symptoms persist.  Barriers to achieving goals as noted in the assessment section may affect outcome.  Prognosis to achieve goals is  fair   Pt. is appropriate for skilled PT intervention as outlined in the Plan of Care (POC).  Pt. is a good candidate for skilled PT services to improve pain levels and " "function.    Goals:  Pt. will be independent with home exercise program in : 4 weeks  Pt. will improve posture : and demonstrate posture with minimal to no cuing;in sitting;in standing;in 6 weeks  Patient Turn Head: for driving;for watching traffic;for conversation;with full ROM;with less difficulty;in 6 weeks  Patient will look up / down: for reading;with full ROM;with less difficulty;in 4 weeks  Patient will decrease : NDI score;for improved quality of function;for improved quality of life;in 6 weeks  Pt will: report no reproduction of neck \"snapping\" with any cervical neck movements by 6 weeks.    Patient's expectations/goals are realistic.    Barriers to Learning or Achieving Goals:  Non- adherence to the home exercise program.  Chronicity of the problem.       Plan / Patient Instructions:        Plan of Care:   Authorization / Certification Start Date: 07/11/18  Authorization / Certification End Date: 10/11/18  Authorization / Certification Number of Visits: 12  Communication with: Referral Source  Patient Related Instruction: Nature of Condition;Treatment plan and rationale;Self Care instruction;Basis of treatment;Body mechanics;Posture;Expected outcome;Next steps  Times per Week: 1-2  Number of Weeks: 6-8  Number of Visits: 12  Therapeutic Exercise: ROM;Stretching;Strengthening  Neuromuscular Reeducation: kinesio tape;posture;TNE;core  Manual Therapy: soft tissue mobilization;myofascial release;joint mobilization;muscle energy  Modalities: TENS    POC and pathology of condition were reviewed with patient.  Pt. is in agreement with the Plan of Care  A Home Exercise Program (HEP) was initiated today.  Pt. was instructed in exercises by PT and patient was given a handout with detailed instructions.    Plan for next visit: review HEP, scapular strengthening and postural review - theraband exercises, wall push ups, cervical isometrics, manual for increased motion and flexibility     Subjective:         History of " Present Illness:    Christian is a 62 y.o. male who presents to therapy today with complaints of snapping neck. Date of onset is 2018 and onset was gradual. Symptoms are intermittent and not improving. Patient reports he does have issues in his leg, did MRI and could possibly due to a nerve in his neck. He denies history of similar symptoms. He describes their previous level of function as not limited. Patient has had PT in the past for his low back.     Pain Ratin  Pain rating at best: 0  Pain rating at worst: 2  Pain description: he feels snapping in his neck    Functional limitations are described as occurring with:   looking down and turning head         Objective:      Note: Items left blank indicates the item was not performed or not indicated at the time of the evaluation.    Patient Outcome Measures :    Neck Disability Score in %: 18   Scores range from 0-100%, where a score of 0% represents minimal pain and maximal function. The minmal clinically important difference is a score reduction of 10%.    Cervical Thoracic Examination  1. Neck problem     2. Neck stiffness     3. Poor posture       Involved side: Right  Posture Observation:      General sitting posture is  poor.  General standing posture is poor.    Cervical ROM:    Date: 2018     *Indicate scale AROM AROM AROM   Cervical Flexion 60 excessive protraction     Cervical Extension 40      Right Left Right Left Right Left   Cervical Sidebending 45 44       Cervical Rotation 60 70       Cervical Protraction      Cervical Retraction      Thoracic Flexion      Thoracic Extension      Thoracic Sidebending         Thoracic Rotation           Strength   WFL and no pain  Date: 2018     Cervical Myotomes/5 Right Left Right Left Right Left   Cervical Flexion (C1-2)         Cervical Sidebending (C3)         Shoulder Elevation (C4)         Shoulder Abduction (C5)         Elbow Flexion (C6)         Elbow Extension (C7)         Wrist Flexion (C7)          Wrist Extension (C6)         Thumb abduction (C8)         Finger Abduction (T1)             Cervical Special Tests   NT today  Cervical Special Tests Right Left UE Nerve Mobility Right Left   Cervical compression   Median nerve     Cervical distraction   Ulnar nerve     Spurling s test   Radial nerve     Shoulder abduction sign   Thoracic outlet     Deep neck flexor endurance test   Vero     Upper cervical rotation   Adson s     Sharper-Jazmine   Cervical rotation lateral flexion     Alar ligament test   Other:     Other:   Other:         Flexibility/Tissue Extensibility: decreased of pectorals, bilateral UT, levators and cervical paraspinals  Palpation: slight TTP of R>L C2-3 facet joint, reporting slight achiness with mobilizations  Passive Mobility-Joint Integrity: Hypomobile.      Treatment Today      Patient Education: Patient educated on plan of care, prognosis, PT/patient role and HEP. Patient educated on impairments related to condition and reproduction of symptoms. Patient instructed to focus on the small goals and this may be a long process to recovery, and that exercises at home are just as important as coming to therapy. Patient was educated on importance of activity modification and exercise consistency in order to see progress and change. Patient demonstrated and verbalized understanding.     Manual Therapy:  STM of cervical paraspinals, levators and suboccipitals   Gentle cervical upglides R>L to increase motion and decrease stiffness - patient did report slight ache after treatment, but still no pain - did demonstrate increased clicking after session    Exercises:  Exercise #1: supine chin tuck - hold 5 sec x 10 reps   Comment #1: scapular retraction - hold 5 seconds  Exercise #2: UT and levator stretch - hold 30 sec x 2  Comment #2: pec doorway stretch - hold 30 sec x 2      TREATMENT MINUTES COMMENTS   Evaluation 15    Self-care/ Home management     Manual therapy 15 STM of cervical  paraspinals, levators and suboccipitals   Gentle cervical upglides R>L to increase motion and decrease stiffness - patient did report slight ache after treatment, but still no pain - did demonstrate increased clicking after session     Neuromuscular Re-education     Therapeutic Activity     Therapeutic Exercises 10 See flowsheet   Gait training     Modality__________________                Total 40    Blank areas are intentional and mean the treatment did not include these items.     PT Evaluation Code: (Please list factors)  Patient History/Comorbidities: diaphragmatic paralysis, MI, NSTEMI, heart stent  Examination: decreased joint mobility and flexibility   Clinical Presentation: stable  Clinical Decision Making: low    Patient History/  Comorbidities Examination  (body structures and functions, activity limitations, and/or participation restrictions) Clinical Presentation Clinical Decision Making (Complexity)   No documented Comorbidities or personal factors 1-2 Elements Stable and/or uncomplicated Low   1-2 documented comorbidities or personal factor 3 Elements Evolving clinical presentation with changing characteristics Moderate   3-4 documented comorbidities or personal factors 4 or more Unstable and unpredictable High                Emani Kennedy, PT  7/11/2018  10:58 AM

## 2021-06-19 NOTE — PROGRESS NOTES
"[facility] Daily Progress     Patient Name: Christian Edwards  Date: 7/16/2018  Visit #: 2/12  Referral Diagnosis: cervical radiculopathy  Referring provider: Jeremías Manriquez*  Visit Diagnosis:     ICD-10-CM    1. Neck problem R68.89    2. Neck stiffness M43.6    3. Poor posture R29.3        Assessment:     Patient is a 62 y.o. male that presents with signs and symptoms consistent with neck stiffness and \"snapping\" secondary to poor posture and neck weakness. Patient demonstrates impairments including decreased cervical range of motion, joint mobility and flexibility, with poor postural awareness and core stability leading to impaired functional mobility. Patient's functional limitations include looking left/right and down without occasional \"snapping\" in his posterior neck.    Today patient reports he feels worse since initial visit, more pain and stiffness in his neck, especially due to the manual therapy. Patient was educated on recovery process and that he may have good and bad days    HEP/POC compliance is  good .  Patient demonstrates understanding/independence with home program.  Patient is appropriate to continue with skilled physical therapy intervention, as indicated by initial plan of care.    Goal Status:  Pt. will be independent with home exercise program in : 4 weeks  Pt. will improve posture : and demonstrate posture with minimal to no cuing;in sitting;in standing;in 6 weeks  Patient Turn Head: for driving;for watching traffic;for conversation;with full ROM;with less difficulty;in 6 weeks  Patient will look up / down: for reading;with full ROM;with less difficulty;in 4 weeks  Patient will decrease : NDI score;for improved quality of function;for improved quality of life;in 6 weeks  Pt will: report no reproduction of neck \"snapping\" with any cervical neck movements by 6 weeks.      Plan / Patient Education:     Continue with initial plan of care.  Progress with home program as " tolerated.    Plan for next visit: review HEP, scapular strengthening and postural review - theraband exercises, wall push ups, cervical isometrics, manual for increased motion and flexibility    Subjective:     Pain Ratin  Patient reports he is feeling worse than initial visit last week. It is just a steady pain, he is noticing it more with simple activities, he feels it all the time now, he can't turn or look down without it snapping. Patient isn't enjoying therapy and his exercises.     Objective:     Cervical ROM:             Date: 2018       *Indicate scale AROM AROM AROM   Cervical Flexion 60 excessive protraction       Cervical Extension 40         Right Left Right Left Right Left   Cervical Sidebending 45 44           Cervical Rotation 60 70                 Treatment Today       Patient Education: Patient was educated on continuing plan of care, progress and review of current HEP. Patient educated on importance of consistency with exercise and therapy, as well as activity modification in order to see change and improvements. Patient demonstrated and verbalized understanding.     Manual Therapy:  STM of cervical paraspinals, levators and suboccipitals   Gentle cervical upglides R>L to increase motion and decrease stiffness - patient did report slight ache after treatment, but still no pain - did demonstrate increased clicking after session    Exercises:  Exercise #1: supine chin tuck - hold 5 sec x 10 reps   Comment #1: scapular retraction - hold 5 seconds  Exercise #2: UT and levator stretch - hold 30 sec x 2  Comment #2: pec doorway stretch - hold 30 sec x 2      TREATMENT MINUTES COMMENTS   Evaluation     Self-care/ Home management     Manual therapy 25 STM of cervical paraspinals, levators and suboccipitals   Gentle cervical upglides R>L to increase motion and decrease stiffness - patient did report slight ache after treatment, but still no pain - did demonstrate increased clicking after session    Neuromuscular Re-education     Therapeutic Activity     Therapeutic Exercises 5 See above flowsheet; review of HEP   Gait training     Modality__________________                Total 30    Blank areas are intentional and mean the treatment did not include these items.       Emani Kennedy, PT  7/16/2018

## 2021-06-19 NOTE — PROGRESS NOTES
ITP ASSESSMENT   Assessment Day: Initial  Session Number: 1  Precautions: paralyzed diaphragm  Diagnosis: MI;Stent  Risk Stratification: High  Referring Provider: Jeremías Manriquez  EXERCISE  Exercise Assessment: Initial     6 Minute Walk Test   Pre   Pre Exercise HR: 82                  Pre Exercise BP: 134/70    Peak  Peak HR: 105                 Peak BP: 164/86  Peak feet: 1075  Peak O2 SAT: 96  Peak RPE: 15  Peak MPH: 2.04    Symptoms:  Peak Symptoms: leg fatigue/leg pain, mild shortness of breath    5 mins. Post  5 Min Post HR: 80  5 Min Post BP: 118/66                         Exercise Plan  Goals Next 30 days  ADL'S: tolerate stair climbing with less shortness of breath/fatigue  Leisure: begin home walking program, 5-10 min 1-2x/day 3-4 days/week  Work: Disabled      Education Goals: Patient can state cardiac s/s and appropriate emergency response.;Has system for taking medication.  Education Goals Met: Medication review.    Exercise Prescription  Exercise Mode: Treadmill;Bike;Nustep;Arm Erg.  Frequency: 2x/week  Duration: 20-40 min  Intensity / THR: 20-30 beats above resting heart rate  RPE 11-14  Progression / Met level: 2.5-3  Resistive Training?: Yes    Current Exercise (mins/week): 1    Interventions  Home Exercise:  Mode: walk, stationary bike  Frequency: 3-4x/week  Duration: 5-10 min, 1-2x/day    Education Material : Provide written material;Individual education and counseling;Offer educational classes    Education Completed  Exercise Education Completed: Cardiac Anatomy;Signs and Symptoms;Medication review;RPE;Emergency Plan;Home Exercise;Warm up/cool down;FITT Principles            Exercise Follow-up/Discharge  Follow up/Discharge: Begin home exercise program NUTRITION  Nutrition Assessment: Initial    Nutrition Risk Factors:  Nutrition Risk Factors: Dyslipidemia;Overweight  Cholesterol: 157  LDL: 71  HDL: 55  Triglycerides: 154    Nutrition Plan  Interventions  Diet Consult: Declined  Other  "Nutrition Intervention: Therapist/Pt Discussion;Provide with Written Material    Education Completed  Nutrition Education Completed: Risk factor overview;Low sodium diet    Goals  Nutrition Goals (Next 30 days): Patient will follow a low saturated fat diet;Patient knows appropriate portion size    Goals Met  Nutrition Goals Met: Patient follows a low sodium diet;Completed Nutritional Risk Screen;Provided Rate your Plate Survey    Height, Weight, and  BMI  Weight: 181 lb 12.8 oz (82.5 kg)  Height: 5' 7\" (1.702 m)  BMI: 28.47         Other Risk Factors  Other Risk Factor Assessment: Initial    HTN Risk Factor: Hypertension    Pre Exercise BP: 134/70  Post Exercise BP: 118/66    Hypertension Plan  Goals  HTN Goals: Exercises regularly    Goals Met  HTN Goals Met: Follow low sodium diet;Take medication as prescribed    HTN Interventions  HTN Interventions: Therapist/patient discussion;Diet consult;Provide written material    HTN Education Completed  HTN Education Completed: Low sodium diet;Medication review;Risk factor overview    Tobacco Risk Factor: NA    Risk Factor Follow-up   HTN, Hyperlipidemia, overweight, inactivity, family history   PSYCHOSOCIAL  Psychosocial Assessment: Initial     [facility] Q of L Summary Score: 23    JAIMEE-D Score: 11    Psychosocial Risk Factor: NA    Psychosocial Plan  Interventions    Interventions: Individual education and counseling    Goals  Goals (Next 30 days): Practicing stress management skills    Goals Met  Goals Met: Identified Support system    Psychosocial Follow-up  Follow-up/Discharge: Pt denies stress           Patient involved in Goal setting?: Yes    Signature: _____________________________________________________________    Date: __________________    Time: __________________  "

## 2021-06-19 NOTE — LETTER
Letter by Jeremías Manriquez MD at      Author: Jeremías Manriquez MD Service: -- Author Type: --    Filed:  Encounter Date: 8/7/2019 Status: (Other)         [facility] FAMILY MEDICINE/OB  08/07/19    Patient: Christian Edwards  YOB: 1955  Medical Record Number: [id number]                                                                  Opioid / Opioid Plus Controlled Substance Agreement    I understand that my care provider has prescribed an opioid (narcotic) controlled substance to help manage my condition(s). I am taking this medicine to help me function or work. I know this is strong medicine, and that it can cause serious side effects. Opioid medicine can be sedating, addicting and may cause a dependency on the drug. They can affect my ability to drive or think, and cause depression. They need to be taken exactly as prescribed. Combining opioids with certain medicines or chemicals (such as cocaine, sedatives and tranquilizers, sleeping pills, meth) can be dangerous or even fatal. Also, if I stop opioids suddenly, I may have severe withdrawal symptoms. Last, I understand that opioids do not work for all types of pain nor for all patients. If not helpful, I may be asked to stop them.      The risks, benefits, and side effects of these medicine(s) were explained to me. I agree that:    1. I will take part in other treatments as advised by my care team. This may be psychiatry or counseling, physical therapy, behavioral therapy, group treatment or a referral to a pain clinic. I will reduce or stop my medicine when my care team tells me to do so.  2. I will take my medicines as prescribed. I will not change the dose or schedule unless my care team tells me to. There will be no refills if I run out early.  I may be contactedwithout warning and asked to complete a urine drug test or pill count at any time.   3. I will keep all my appointments, and understand this is part of  the monitoring of opioids. My care team may require an office visit for EVERY opioid/controlled substance refill. If I miss appointments or dont follow instructions, my care team may stop my medicine.  4. I will not ask other providers to prescribe controlled substances, and I will not accept controlled substances from other people. If I need another prescribed controlled substance for a new reason, I will tell my care team within 1 business day.  5. I will use one pharmacy to fill all of my controlled substance prescriptions, and it is up to me to make sure that I do not run out of my medicines on weekends or holidays. If my care team is willing to refill my opioid prescription without a visit, I must request refills only during office hours, refills may take up to 3 days to process, and it may take up to 5 to 7 days for my medicine to be mailed and ready at my pharmacy. Prescriptions will not be mailed anywhere except my pharmacy.        [id number]  Rev 12/18         Registration to scan to EHR                             Page 1 of 2               Controlled Substance Agreement Opioid        [facility] FAMILY MEDICINE/OB  08/07/19  Patient: Christian Edwards  YOB: 1955  Medical Record Number: [id number]                                                                  6. I am responsible for my prescriptions. If the medicine/prescription is lost or stolen, it will not be replaced. I also agree not to share controlled substance medicines with anyone.  7. I agree to not use ANY illegal or recreational drugs. This includes marijuana, cocaine, bath salts or other drugs. I agree not to use alcohol unless my care team says I may.          I agree to give urine samples whenever asked. If I dont give a urine sample, the care team may stop my medicine.    8. If I enroll in the [place] Medical Marijuana program, I will tell my care team. I will also sign an agreement to share my medical records with  my care team.   9. I will bring in my list of medicines (or my medicine bottles) each time I come to the clinic.   10. I will tell my care team right away if I become pregnant or have a new medical problem treated outside of my regular clinic.  11. I understand that this medicine can affect my thinking and judgment. It may be unsafe for me to drive, use machinery and do dangerous tasks. I will not do any of these things until I know how the medicine affects me. If my dose changes, I will wait to see how it affects me. I will contact my care team if I have concerns about medicine side effects.    I understand that if I do not follow any of the conditions above, my prescriptions or treatment may be stopped.      I agree that my provider, clinic care team, and pharmacy may work with any city, state or federal law enforcement agency that investigates the misuse, sale, or other diversion of my controlled medicine. I will allow my provider to discuss my care with or share a copy of this agreement with any other treating provider, pharmacy or emergency room where I receive care. I agree to give up (waive) any right of privacy or confidentiality with respect to these consents.     I have read this agreement and have asked questions about anything I did not understand.      ________________________________________________________________________  Patient signature - Date/Time -  Christian Edwards                                      ________________________________________________________________________  Witness signature                                                            ________________________________________________________________________  Provider signature - Jeremías Manriquez MD      [id number]  Rev 12/18         Registration to scan to EHR                         Page 2 of 2                   Controlled Substance Agreement Opioid           Page 1 of 2  Opioid Pain Medicines (also known as  Narcotics)  What You Need to Know    What are opioids?   Opioids are pain medicines that must be prescribed by a doctor.  They are also known as narcotics.    Examples are:     morphine (MS Contin, Luciana)    oxycodone (Oxycontin)    oxycodone and acetaminophen (Percocet)    hydrocodone and acetaminophen (Vicodin, Norco)     fentanyl patch (Duragesic)     hydromorphone (Dilaudid)     methadone     What do opioids do well?   Opioids are best for short-term pain after a surgery or injury. They also work well for cancer pain. Unlike other pain medicines, they do not cause liver or kidney failure or ulcers. They may help some people with long-lasting (chronic) pain.     What do opioids NOT do well?   Opioids never get rid of pain entirely, and they do not work well for most patients with chronic pain. Opioids do not reduce swelling, one of the causes of pain. They also dont work well for nerve pain.                           For informational purposes only.  Not to replace the advice of your care provider.  Copyright [address] [facility]. All right reserved. [employer] 731994-[id number] 02/18.      Page 2 of 2    Risks and side effects   Talk to your doctor before you start or decide to keep taking one of these medicines. Side effects include:    Lowering your breathing rate enough to cause death    Overdose, including death, especially if taking higher than prescribed doses    Long-term opioid use    Worse depression symptoms; less pleasure in things you usually enjoy    Feeling tired or sluggish    Slower thoughts or cloudy thinking    Being more sensitive to pain over time; pain is harder to control    Trouble sleeping or restless sleep    Changes in hormone levels (for example, less testosterone)    Changes in sex drive or ability to have sex    Constipation    Unsafe driving    Itching and sweating    Feeling dizzy    Nausea, vomiting and dry mouth    What else should I know about opioids?  When someone takes  opioids for too long or too often, they become dependent. This means that if you stop or reduce the medicine too quickly, you will have withdrawal symptoms.    Dependence is not the same as addiction. Addiction is when people keep using a substance that harms their body, their mind or their relations with others. If you have a history of drug or alcohol abuse, taking opioids can cause a relapse.    Over time, opioids dont work as well. Most people will need higher and higher doses. The higher the dose, the more serious the side effects. We dont know the long-term effects of opioids.      Prescribed opioids aren't the best way to manage chronic pain    Other ways to manage pain include:      Ibuprofen or acetaminophen.  You should always try this first.      Treat health problems that may be causing pain.      acupuncture or massage, deep breathing, meditation, visual imagery, aromatherapy.      Use heat or ice at the pain site      Physical therapy and exercise      Stop smoking      See a counselor or therapist                                                  People who have used opioids for a long time may have a lower quality of life, worse depression, higher levels of pain and more visits to doctors.    Never share your opioids with others. Be sure to store opioids in a secure place, locked if possible.Young children can easily swallow them and overdose.     You can overdose on opioids.  Signs of overdose include decrease or loss of consciousness, slowed breathing, trouble waking and blue lips.  If someone is worried about overdose, they should call 911.    If you are at risk for overdose, you may get naloxone (Narcan, a medicine that reverses the effects of opioids.  If you overdose, a friend or family member can give you Narcan while waiting for the ambulance.  They need to know the signs of overdose and how to give Narcan.    While you're taking opioids:    Don't use alcohol or street drugs. Taking them  together can cause death.    Don't take any of these medicines unless your doctor says its okay.  Taking these with opioids can cause death.    Benzodiazepines (such as lorazepam         or diazepam)    Muscle relaxers (such as cyclobenzaprine)    sleeping pills    other opioids    Safe disposal of opioids  Find your area drug take-back program, your pharmacy mail-back program, buy a special disposal bag (such as Deterra) from your pharmacy or flush them down the toilet.  Use the guidelines at:  www.fda.gov/drugs/resourcesforyou

## 2021-06-19 NOTE — LETTER
Letter by Katlin Cisse MD at      Author: Katlin Cisse MD Service: -- Author Type: --    Filed:  Encounter Date: 10/2/2019 Status: Signed         Jeremías Manriquez MD  [address] [facility] [address]                                  October 2, 2019    Patient: Christian Edwards   MR Number: [id number]   YOB: 1955   Date of Visit: 10/2/2019     Dear Dr. Declan MD:    Thank you for referring Christian Edwards to me for evaluation. Below are the relevant portions of my assessment and plan of care.    If you have questions, please do not hesitate to call me. I look forward to following Christian along with you.    Sincerely,        Katlin Cisse MD          CC  No Recipients  Katlin Cisse MD  10/2/2019  5:07 PM  Sign when Signing Visit  Pulmonary Note  10/2/2019      CCx: Follow-up Restrictive ventilatory dysfunction, spontaneous diaphragm paralysis      History:     Patient is a pleasant 59-year-old male with a past medical history significant for an MI, alcoholic hepatitis, tubulovillous adenoma of the large intestine and hyperlipidemia who was originally referred by Dr. Jeronimo from the sleep clinic for further evaluation of ventilatory insufficiency.  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  He has not seen me in over 2 years and at the time I had referred him for consideration of diaphragmatic plication due to ongoing symptoms.  Patient had declined this intervention and has not been seen by myself since then.  He was recently admitted to the hospital with a bowel obstruction and subsequently underwent right hemicolectomy which revealed moderately well-differentiated mucinous adenocarcinoma.  He was also  noted to have vomiting embolism during that same visit and an echocardiogram revealed an ejection fraction of 47% with distal anterior, anteroseptal and apical hypokinesis.  His LAD, left circumflex and RCA stents were all noted to be patent in view of his active bleeding and negative troponins the plan was to medically optimize the patient for his upcoming hemicolectomy.  Since his discharge he thinks that his breathing is at his baseline and he continues to be compliant with his AVAPS device.  He sometimes feels like he is unable to take a deep breath but then this sensation passes.    Medical summary:  He mentions being diagnosed with diaphragm paralysis in 2004.  He denies any trauma or preceding events that he can recall that correlate with his diagnosis.  He mentions undergoing fairly extensive workup with prior sniff test that showed paralysis on the right.  Subsequent imaging included CT of the chest that showed right middle lobe atelectasis but no obstructing lesion or no other parenchymal abnormality.  He additionally has undergone MRI of the brain and neck with no lesions.  Additionally underwent abdominal CT and MRI failed to demonstrate any pathology other than upper protrusion of his liver into his hemithorax.       PMH:  Reviewed in epic and unchanged from prior visit.    Past Surgical History:   Procedure Laterality Date   ? CARDIAC SURGERY      five stents   ? COLECTOMY N/A 8/19/2019    Procedure: RIGHT COLECTOMY, OPEN AND UMBILICAL HERNIA REPAIR;  Surgeon: Epifanio Black MD;  Location: [facility];  Service: General   ? COLONOSCOPY N/A 8/13/2019    Procedure: COLONOSCOPY WITH BIOPSY;  Surgeon: Antonio Rajput MD;  Location: [facility] OR;  Service: Gastroenterology   ? CV CORONARY ANGIOGRAM N/A 8/12/2019    Procedure: Coronary Angiogram;  Surgeon: Eddie Rocha MD;  Location: [facility] Cath Lab;  Service: Cardiology   ? CV LEFT HEART CATHETERIZATION WO LEFT  "VETRICULOGRAM Left 2019    Procedure: Left Heart Catheterization Without Left Ventriculogram;  Surgeon: Eddie Rocha MD;  Location: [facility] Cath Lab;  Service: Cardiology   ? IR IVC FILTER PLACEMENT  2019   ? PICC AND MIDLINE TEAM LINE INSERTION  2019        ? stemi       Social History     Socioeconomic History   ? Marital status:      Spouse name: Not on file   ? Number of children: Not on file   ? Years of education: Not on file   ? Highest education level: Not on file   Occupational History   ? Not on file   Social Needs   ? Financial resource strain: Not on file   ? Food insecurity:     Worry: Not on file     Inability: Not on file   ? Transportation needs:     Medical: Not on file     Non-medical: Not on file   Tobacco Use   ? Smoking status: Former Smoker     Last attempt to quit: 2002     Years since quittin.7   ? Smokeless tobacco: Never Used   Substance and Sexual Activity   ? Alcohol use: Yes     Alcohol/week: 36.0 standard drinks     Types: 36 Shots of liquor per week     Comment: drinks daily. 6-8 drinks per day/quit 19   ? Drug use: Yes     Types: Marijuana     Comment: \"once in a while\"   ? Sexual activity: Not Currently   Lifestyle   ? Physical activity:     Days per week: Not on file     Minutes per session: Not on file   ? Stress: Not on file   Relationships   ? Social connections:     Talks on phone: Not on file     Gets together: Not on file     Attends Jewish service: Not on file     Active member of club or organization: Not on file     Attends meetings of clubs or organizations: Not on file     Relationship status: Not on file   ? Intimate partner violence:     Fear of current or ex partner: Not on file     Emotionally abused: Not on file     Physically abused: Not on file     Forced sexual activity: Not on file   Other Topics Concern   ? Not on file   Social History Narrative   ? Not on file     Family History   Problem Relation Age of Onset   ? " Sleep apnea Brother    ? COPD Mother    ? Heart disease Father    ? No Medical Problems Sister      No Known Allergies    Review of Systems - 12 point ROS negative        Medications:     Current Outpatient Medications on File Prior to Visit   Medication Sig Dispense Refill   ? acetaminophen (TYLENOL) 500 MG tablet Take 1-2 tablets (500-1,000 mg total) by mouth every 4 (four) hours as needed.  0   ? aspirin 81 MG EC tablet Take 1 tablet (81 mg total) by mouth daily.  0   ? atorvastatin (LIPITOR) 20 MG tablet TAKE 1 TABLET BY MOUTH AT  BEDTIME 90 tablet 3   ? atorvastatin (LIPITOR) 80 MG tablet Take 1 tablet (80 mg total) by mouth at bedtime. 30 tablet 0   ? clopidogrel (PLAVIX) 75 mg tablet TAKE 1 TABLET BY MOUTH  DAILY. NO MORE REFILLS TILL SEEN. 90 tablet 0   ? codeine 30 MG tablet Take 1 tablet (30 mg total) by mouth every 8 (eight) hours as needed for pain. 90 tablet 0   ? ferrous sulfate 325 (65 FE) MG tablet Take 1 tablet (325 mg total) by mouth daily with breakfast. Take with orange juice. 90 tablet 1   ? isosorbide dinitrate (ISORDIL) 10 MG tablet Take 1 tablet (10 mg total) by mouth 3 (three) times a day at 8:00 am, 12:00 noon and 6:00 pm. 270 tablet 1   ? metoprolol succinate (TOPROL-XL) 100 MG 24 hr tablet TAKE 1 TABLET BY MOUTH  DAILY 90 tablet 3   ? multivitamin (MULTIVITAMIN) per tablet Take 1 tablet by mouth daily.     ? nitroglycerin (NITROSTAT) 0.4 MG SL tablet Place 0.4 mg under the tongue every 5 (five) minutes as needed.            ? omeprazole (PRILOSEC) 40 MG capsule TAKE 1 CAPSULE BY MOUTH  DAILY 90 capsule 0   ? rivaroxaban (XARELTO) 15 mg (42)- 20 mg (9) dosepak Take 1 tablet (15mg) po bid for 21 days then take 1 tablet (20mg) daily 51 tablet 0   ? [DISCONTINUED] pantoprazole (PROTONIX) 40 MG tablet TAKE 1 TABLET BY MOUTH  DAILY 90 tablet 0   ? [DISCONTINUED] hydroCHLOROthiazide (HYDRODIURIL) 25 MG tablet TAKE 1 TABLET BY MOUTH  DAILY 90 tablet 0     No current facility-administered  medications on file prior to visit.            Exam/Data:   Vitals  Vitals:    10/02/19 1349   BP: 112/64   Pulse: 76   Resp: 20   SpO2: 97%       EXAM:  Physical Exam  Vitals signs reviewed.   Constitutional:       General: He is not in acute distress.     Appearance: He is well-developed. He is not diaphoretic.   HENT:      Head: Normocephalic and atraumatic.      Nose: Nose normal.      Mouth/Throat:      Pharynx: No oropharyngeal exudate.   Eyes:      Conjunctiva/sclera: Conjunctivae normal.      Pupils: Pupils are equal, round, and reactive to light.   Neck:      Musculoskeletal: Normal range of motion and neck supple.      Thyroid: No thyromegaly.      Vascular: No JVD.      Trachea: No tracheal deviation.   Cardiovascular:      Rate and Rhythm: Normal rate and regular rhythm.      Heart sounds: Normal heart sounds. No murmur. No friction rub.   Pulmonary:      Effort: Pulmonary effort is normal. No respiratory distress.      Breath sounds: Normal breath sounds. No wheezing or rales.   Chest:      Chest wall: No tenderness.   Abdominal:      General: Bowel sounds are normal. There is no distension.      Palpations: Abdomen is soft.      Tenderness: There is no tenderness.   Musculoskeletal: Normal range of motion.         General: No tenderness.   Skin:     General: Skin is warm and dry.      Coloration: Skin is not pale.      Findings: No erythema.   Neurological:      Mental Status: He is alert and oriented to person, place, and time.      Cranial Nerves: No cranial nerve deficit.      Sensory: No sensory deficit.      Motor: No atrophy or abnormal muscle tone.      Deep Tendon Reflexes:      Reflex Scores:       Tricep reflexes are 2+ on the right side and 2+ on the left side.       Bicep reflexes are 2+ on the right side and 2+ on the left side.       Patellar reflexes are 2+ on the right side and 2+ on the left side.  Psychiatric:         Thought Content: Thought content normal.           DATA:  Coronary  Angiogram 8/12/19:  LM:no obstruction   LAD:Ca2+, patent proximal and distal stents, mid-vessel at take off of a D2 has Ca2+ ~60% narrowing but AVELINO 3 flow  Lcx:patent proximal and OM3 stents, but a small inferior subdivision of an OM3 appears occluded consistent w/  w/ faint L-L collaterals  RCA:patent proximal and mid-vessel stents, diffuse Ca2+, Ca2+ 90% PLV narrowing but AVELINO 3 flow     LVEDP:24    TTE 8/12/19:    When compared to the previous study dated 8/8/2019, anterior and apical hypokinesis is now seen    Left ventricle ejection fraction is mildly decreased. The calculated left ventricular ejection fraction is 47%.    Mild to distal anterior, anteroseptal and apical hypokinesis    Normal right ventricular size and systolic function.    No hemodynamically significant valvular heart abnormalities.     CT Chest with contrast 8/8/19:  1.  Findings positive for small burden of pulmonary emboli limited to the right upper lobe. Case discussed with the patient's nurse Sharon at 1139 hours.  2.  No evidence for metastatic disease involving thorax.  3.  Increasing interstitial interstitial prominence may have some component of acute edema though likely relates to progression of fibrosis.    (Unchanged from previous visit)  PFT's 5/17/17:  FEV1/FVC is 82% and is normal.  FEV1 is 2.35L (75%) predicted and is normal.  FVC is 2.85L (70%) predicted and normal.  There was no improvement in spirometry after a single inhaled dose of bronchodilator.  TLC is 4.98L (78%) predicted and is normal.  RV is 2.30L (99%) predicted and is normal.  DLCO is 19.92ml/min/hg (74%) predicted and is normal when it is corrected for hemoglobin.  Flow volume loops indicate no significant abnormalities.  MEP 79  MIP -48    Impression:  Full Pulmonary Function Test is abnormal.  PFTs are consistent with no obstructive disease.  Spirometry is not consistent with reversibility.  There is no hyperinflation.  There is no air-trapping.  Diffusion  capacity when corrected for hemoglobin is normal.   MIP and MEP both reduced and in particular the MIP is significantly reduced compared to the one from last year.    PFT's 5/11/16:  MEP 83  MIP -66    PFT's 5/2/16:  FEV1/FVC is 79% and is normal.  FEV1 is 2.3L (72%) predicted and is normal.  FVC is 2.92L (72%) predicted and normal.  There was no improvement in spirometry after a single inhaled dose of bronchodilator.  TLC is 4.27L (67%) predicted and is reduced.  RV is 1.7L (74%) predicted and is reduced.  DLCO is 22.38ml/min/hg (83%) predicted and is normal when it is corrected for hemoglobin.  Flow volume loops indicate no significant abnormalities.    Impression:  Full Pulmonary Function Test is abnormal.  PFTs are consistent with no evidence of restrictive or obstructive disease.  Spirometry is not consistent with reversibility.  There is no hyperinflation.  There is no air-trapping.  There is moderate restriction noted on plethysmography which is similar to his values last year.  Diffusion capacity when corrected for hemoglobin is normal.       PFT's 4/27/15:  FEV1/FVC is 78% and is normal.   FEV1 is 2.29L (69%) predicted and is reduced.   FVC is 2.94L (68%) predicted and reduced.   There was no improvement in spirometry after a single inhaled dose of bronchodilator.   TLC is 4.06L (63%) predicted and is reduced.   RV is 1.35L (65%) predicted and is reduced.   DLCO is 21.14ml/min/hg (74%) predicted and is reduced when it is corrected for hemoglobin.   Flow volume loops indicate questionable variable intrathoracic obstruction with a flattened inspiratory loop.     Impression: Full Pulmonary Function Test is abnormal. PFTs are consistent with mild restrictivedisease.   Spirometry is not consistent with reversibility.   There is no hyperinflation.   There is no air-trapping.   Plethysmography indicates diminished lung volumes.   Diffusion capacity when corrected for hemoglobin is mildly reduced.   Flow volume  loops concerning for variable intrathoracic obstruction, correlation with neck CT would be advisable.   MIP -16 (sitting) -44 (supine)  MEP 84 (sitting) 18 (supine)      HRCT Chest with inspiratory and expiratory cuts 5/26/15 (unchanged from prior visit):  1. Mild peripheral septal thickening without honeycombing. Findings compatible with nonspecific fibrosis.  2. No evidence of bronchiectasis nor tracheomalacia.  3. Coronary atherosclerosis.  4. Eccentric thickening gastric cardia adjacent to GE junction of uncertain significance with prominent adjacent to gastrohepatic lymph nodes. Consider upper endoscopy to further assess.    XR Sniff Test 4/24/15:  Right hemidiaphragm is elevated relative to the left.   Patient was observed in both the AP and lateral projections. Both diaphragms move normally during the sniff exam but the right does not move as far inferiorly as the left does.    Assessment/Plan:   Christian Edwards is a 64 y.o. male who presents for a follow-up visit after recent discharge after being diagnosed with a pulmonary embolism and a colonic adenocarcinoma status post right hemicolectomy.  He continues to be short of breath although does not feel that this is progressed much since his last clinic visit with me several years ago related to a paralyzed right hemidiaphragm.     1. We will repeat PFTs including MIP, MEP and MVV and get back to him with the results of these.  2. Continue IVAPS therapy regarding his sleep disordered breathing.  3. I do not believe that his interstitial lung disease is progressed given that he was subsequently noted to have an elevated EDP of 24 mmHg which would appear to be more consistent with a diagnosis of volume overload.      Katlin MICHAELS [facility]  Pulmonary and Critical Care  [id number]

## 2021-06-19 NOTE — PROGRESS NOTES
Cardiac Rehab  Phase II Assessment    Assessment Date: 7/9/2018    Diagnosis: NSTEMI  Date of Onset: 6/7/2018  Procedure: AMARJIT x 3  Date of Onset: 6/8/1018  ICD/Pacemaker: No Parameters: na  Post-op Complications: none  ECG History: SR EF%:60  Past Medical History: CAD/MI 2002, GERD, HTN, Hyperlipidemia, MURALI, paralyzed diaphragm, alcohol abuse    Physical Assessment  Precautions/ Physical Limitations: leg fatigue/pain with ambulation  Oxygen: No  O2 Sats: 98 Lung Sounds: did not assess Edema: none  Incisions: healed  Sleeping Pattern: fair   Appetite: fair   Nutrition Risk Screen: no concerns    Pain  Location: lower legs/shins  Characteristics:burning  Intensity: (0-10 scale) 8  Current Pain Management: rest after walking  Intervention: none  Response: resolves with rest    Psychosocial/ Emotional Health  1. In the past 12 months, have you been in a relationship where you have been abused physically, emotionally, sexually or financially? No  notified: NA  2. Who do you turn to for emotional support?: children  3. Do you have cultural or spiritual needs? No  4. Have there been any major life changes in the past 12 months? No    Referral Information  Primary Physician: Jeremías Manriquez MD  Cardiologist: pt to bring name of MD    Home exercise/Equipment: stationary bike    Patient's long-term goal(s): be able to tolerate increased duration with ambulation    1. Living Accommodations: Home Steps: Yes      Support people at home: lives alone   2. Marital Status:   3. Family is able to assist with cares      Rastafarian/Community involvement: na  4. Recreation/Hobbies: na

## 2021-06-19 NOTE — LETTER
Letter by Edie Aragon RN at      Author: Edie Aragon RN Service: -- Author Type: --    Filed:  Encounter Date: 8/30/2019 Status: (Other)       Dear Christian Edwards    Thank you for choosing [facility] for your care.  We are committed to providing you with the highest quality and compassionate healthcare services.  The following information pertains to your first appointment with our clinic.    Date/Time of appointment:  Monday September 16th at 10:30am.  This allows time to complete forms, possible labs and nursing assessment.    Name of your Physician:  Bayron Sy MD    What to bring to your appointment:    Completed Patient History/Initial Nursing Assessment and Medication/Allergy List (these forms were sent to you).    Any paperwork or films from your physician that we have asked you to bring.    Your current insurance card(s).    Parking:    Please refer to the map included to direct you to [facility].    You can park in any visitor/patient parking area you choose. There is no charge for parking at [facility].     Enter the hospital at the front/main entrance.      Please check in with our  representatives who will escort you to the clinic located in [address] of the [facility].    Please note first time appointments can last 1-1.5 hours.     We hope these instructions are helpful to you.  If you have any questions or concerns, please call us at [phone].  It is our pleasure to assist you.    Warm Regards,  Edie Aragon  Nurse Navigator  [phone]

## 2021-06-19 NOTE — PROGRESS NOTES
ITP ASSESSMENT   Assessment Day: 30 Day  Session Number: 9  Precautions: paralyzed diaphragm  Diagnosis: MI;Stent  Risk Stratification: High  Referring Provider: Jeremías Manriquez*   ITP: Dr. Gomez  EXERCISE  Exercise Assessment: Reassessment                          Exercise Plan  Goals Next 30 days  ADL'S: Pt tolerating all home activities, goal met  Leisure: Tolerate biking duration >10 min  x 2, walking 10 min x 3, for total of 30 min aerobic exercise 3-4x/week (Pt states he is going to try to find a more comfortable seat)  Work: Disabled    Education Goals: All goals in this section met  Education Goals Met: Patient can state cardiac s/s and appropriate emergency response.;Has system for taking medication.;Medication review.                        Goals Met  Initial ADL's goals met: Pt continues to have difficulty with stair climbing due to diaphragm not functioning properly.  Pt states he is tolerating all other home activities  Initial Leisure goals met: Pt is tolerating walking 3-4x/week for 10 min x2  Intial Work goals met: Disabled  Initial Progression: Pt states shin pain is limiting his walking duration.    Exercise Prescription  Exercise Mode: Treadmill;Bike;Nustep;Arm Erg.  Frequency: 2x/week  Duration: 35-45 min  Intensity / THR: 20-30 beats above resting heart rate  RPE 11-14  Progression / Met level: 2.5-3.5  Resistive Training?: Yes    Current Exercise (mins/week): 110    Interventions  Home Exercise:  Mode: walk, stationary bike  Frequency: 3-4x/week  Duration: 10-15 min 1-2x/day    Education Material : Provide written material;Individual education and counseling;Offer educational classes    Education Completed  Exercise Education Completed: Cardiac Anatomy;Signs and Symptoms;Medication review;RPE;Emergency Plan;Home Exercise;Warm up/cool down;FITT Principles            Exercise Follow-up/Discharge  Follow up/Discharge: Pt states he is walking or riding his stationary bike for 10 min x 2  "most non-rehab days         NUTRITION  Nutrition Assessment: Reassessment    Nutrition Risk Factors:  Nutrition Risk Factors: Dyslipidemia;Overweight  Cholesterol: 157  LDL: 71  HDL: 55  Triglycerides: 154    Nutrition Plan  Interventions  Diet Consult: Declined  Other Nutrition Intervention: Therapist/Pt Discussion;Provide with Written Material  Initial Rate Your Plate Score: 43    Education Completed  Nutrition Education Completed: Low Saturated fat diet;Low sodium diet    Goals  Nutrition Goals (Next 30 days): Patient will follow a low saturated fat diet    Goals Met  Nutrition Goals Met: Patient follows a low sodium diet;Patient knows appropriate portion size    Height, Weight, and  BMI  Weight: 180 lb (81.6 kg)  Height: 5' 7\" (1.702 m)  BMI: 28.19    Nutrition Follow-up  Follow-up/Discharge: Pt states he does not pay too much attention to low fat/low chol diet.       Other Risk Factors  Other Risk Factor Assessment: Reassessment    HTN Risk Factor: Hypertension    Pre Exercise BP: 122/76  Post Exercise BP: 116/62    Hypertension Plan  Goals  HTN Goals: Patient demonstrates understanding of HTN, no goals identified for the next 30 days    Goals Met  HTN Goals Met: Follow low sodium diet;Take medication as prescribed;Exercises regularly    HTN Interventions  HTN Interventions: Therapist/patient discussion;Diet consult;Provide written material    HTN Education Completed  HTN Education Completed: Low sodium diet;Medication review;Risk factor overview    Tobacco Risk Factor: NA         PSYCHOSOCIAL  Psychosocial Assessment: Reassessment     Psychosocial Risk Factor: NA    Psychosocial Plan  Interventions  Interventions: Individual education and counseling    Education Completed  Education Completed: Relaxation/Coping Techniques    Goals  Goals (Next 30 days): Patient demonstrates understanding of stress, no goals identified for the next 30 days    Goals Met  Goals Met: Identified Support system;Practicing stress " management skills    Psychosocial Follow-up  Follow-up/Discharge: Pt denies stress         Patient involved in Goal setting?: Yes. Pt pleased with progress, states the exercise is more comfortable.  Limiting factors are his breathing due to diaphragm and shin pain with walking.    Signature: _____________________________________________________________    Date: __________________    Time: __________________

## 2021-06-20 NOTE — LETTER
Letter by Bayron Sy MD at      Author: Bayron Sy MD Service: -- Author Type: --    Filed:  Encounter Date: 8/10/2020 Status: (Other)                   Office Hours: Monday - Friday 8:00 - 4:30PM    Christian Edwards  [address] [facility] [address]           August 10, 2020      Dear Christian:    Our clinic records indicate we have attempted to contact you three (3) or more times to schedule a follow up appointment. Unfortunately, we have been unable to reach you. To prevent further delays in your care, please contact our office to schedule your appointment.         Sincerely,        [facility]

## 2021-06-20 NOTE — PROGRESS NOTES
Patient referred for bilateral lower extremity pain felt to be due to varicose veins. He reports pain from knees to ankle bilaterally.  He was recently admitted to the hospital with a STEMI, this was at the [facility].   On my evaluation he has palpable pulses and the varicose veins are quite small.  I do not believe his complaints are due to arterial or venous insufficiency.  I will advise his referring doc to seek alternate etiology.

## 2021-06-20 NOTE — LETTER
Letter by Bayron Sy MD at      Author: Bayron Sy MD Service: -- Author Type: --    Filed:  Encounter Date: 10/2/2020 Status: (Other)                   Office Hours: Monday - Friday 8:00 - 4:30PM    Christian Edwards  [address] [place] [address]           October 2, 2020      Dear Christian:    Our clinic records indicate we have attempted to contact you three (3) or more times to schedule a lab draw follow up appointment. Unfortunately, we have been unable to reach you. To prevent further delays in your care, please contact our office to schedule your appointment. [phone]         Sincerely,  Dr Sy      [facility]

## 2021-06-20 NOTE — LETTER
Letter by Antonio Cota CNP at      Author: Antonio Cota CNP Service: -- Author Type: --    Filed:  Encounter Date: 9/3/2020 Status: (Other)         [facility] FAMILY MEDICINE/OB  09/03/20    Patient: Christian Edwards  YOB: 1955  Medical Record Number: [id number]                                                                  Opioid / Opioid Plus Controlled Substance Agreement    I understand that my care provider has prescribed an opioid (narcotic) controlled substance to help manage my condition(s). I am taking this medicine to help me function or work. I know this is strong medicine, and that it can cause serious side effects. Opioid medicine can be sedating, addicting and may cause a dependency on the drug. They can affect my ability to drive or think, and cause depression. They need to be taken exactly as prescribed. Combining opioids with certain medicines or chemicals (such as cocaine, sedatives and tranquilizers, sleeping pills, meth) can be dangerous or even fatal. Also, if I stop opioids suddenly, I may have severe withdrawal symptoms. Last, I understand that opioids do not work for all types of pain nor for all patients. If not helpful, I may be asked to stop them.        The risks, benefits, and side effects of these medicine(s) were explained to me. I agree that:    1. I will take part in other treatments as advised by my care team. This may be psychiatry or counseling, physical therapy, behavioral therapy, group treatment or a referral to a pain clinic. I will reduce or stop my medicine when my care team tells me to do so.  2. I will take my medicines as prescribed. I will not change the dose or schedule unless my care team tells me to. There will be no refills if I run out early.  I may be contactedwithout warning and asked to complete a urine drug test or pill count at any time.   3. I will keep all my appointments, and understand this is part of the monitoring of  opioids. My care team may require an office visit for EVERY opioid/controlled substance refill. If I miss appointments or dont follow instructions, my care team may stop my medicine.  4. I will not ask other providers to prescribe controlled substances, and I will not accept controlled substances from other people. If I need another prescribed controlled substance for a new reason, I will tell my care team within 1 business day.  5. I will use one pharmacy to fill all of my controlled substance prescriptions, and it is up to me to make sure that I do not run out of my medicines on weekends or holidays. If my care team is willing to refill my opioid prescription without a visit, I must request refills only during office hours, refills may take up to 3 days to process, and it may take up to 5 to 7 days for my medicine to be mailed and ready at my pharmacy. Prescriptions will not be mailed anywhere except my pharmacy.        [id number]  Rev 12/18         Registration to scan to EHR                             Page 1 of 2               Controlled Substance Agreement Opioid        [facility] FAMILY MEDICINE/OB  09/03/20  Patient: Christian Edwards  YOB: 1955  Medical Record Number: [id number]                                                                  6. I am responsible for my prescriptions. If the medicine/prescription is lost or stolen, it will not be replaced. I also agree not to share controlled substance medicines with anyone.  7. I agree to not use ANY illegal or recreational drugs. This includes marijuana, cocaine, bath salts or other drugs. I agree not to use alcohol unless my care team says I may.          I agree to give urine samples whenever asked. If I dont give a urine sample, the care team may stop my medicine.    8. If I enroll in the [place] Medical Marijuana program, I will tell my care team. I will also sign an agreement to share my medical records with my care team.   9. I  will bring in my list of medicines (or my medicine bottles) each time I come to the clinic.   10. I will tell my care team right away if I become pregnant or have a new medical problem treated outside of my regular clinic.  11. I understand that this medicine can affect my thinking and judgment. It may be unsafe for me to drive, use machinery and do dangerous tasks. I will not do any of these things until I know how the medicine affects me. If my dose changes, I will wait to see how it affects me. I will contact my care team if I have concerns about medicine side effects.    I understand that if I do not follow any of the conditions above, my prescriptions or treatment may be stopped.      I agree that my provider, clinic care team, and pharmacy may work with any city, state or federal law enforcement agency that investigates the misuse, sale, or other diversion of my controlled medicine. I will allow my provider to discuss my care with or share a copy of this agreement with any other treating provider, pharmacy or emergency room where I receive care. I agree to give up (waive) any right of privacy or confidentiality with respect to these consents.     I have read this agreement and have asked questions about anything I did not understand.      ________________________________________________________________________  Patient signature - Date/Time -  Christian Edwards                                      ________________________________________________________________________  Witness signature                                                            ________________________________________________________________________  Provider signature - Antonio Cota, CNP      [id number]  Rev 12/18         Registration to scan to EHR                         Page 2 of 2                   Controlled Substance Agreement Opioid           Page 1 of 2  Opioid Pain Medicines (also known as Narcotics)  What You Need to Know    What  are opioids?   Opioids are pain medicines that must be prescribed by a doctor.  They are also known as narcotics.    Examples are:     morphine (MS Contin, Luciana)    oxycodone (Oxycontin)    oxycodone and acetaminophen (Percocet)    hydrocodone and acetaminophen (Vicodin, Norco)     fentanyl patch (Duragesic)     hydromorphone (Dilaudid)     methadone     What do opioids do well?   Opioids are best for short-term pain after a surgery or injury. They also work well for cancer pain. Unlike other pain medicines, they do not cause liver or kidney failure or ulcers. They may help some people with long-lasting (chronic) pain.     What do opioids NOT do well?   Opioids never get rid of pain entirely, and they do not work well for most patients with chronic pain. Opioids do not reduce swelling, one of the causes of pain. They also dont work well for nerve pain.                           For informational purposes only.  Not to replace the advice of your care provider.  Copyright [address] [facility]. All right reserved. [employer] [id number]-[employer] 02/18.      Page 2 of 2    Risks and side effects   Talk to your doctor before you start or decide to keep taking one of these medicines. Side effects include:    Lowering your breathing rate enough to cause death    Overdose, including death, especially if taking higher than prescribed doses    Long-term opioid use    Worse depression symptoms; less pleasure in things you usually enjoy    Feeling tired or sluggish    Slower thoughts or cloudy thinking    Being more sensitive to pain over time; pain is harder to control    Trouble sleeping or restless sleep    Changes in hormone levels (for example, less testosterone)    Changes in sex drive or ability to have sex    Constipation    Unsafe driving    Itching and sweating    Feeling dizzy    Nausea, vomiting and dry mouth    What else should I know about opioids?  When someone takes opioids for too long or too often, they become  dependent. This means that if you stop or reduce the medicine too quickly, you will have withdrawal symptoms.    Dependence is not the same as addiction. Addiction is when people keep using a substance that harms their body, their mind or their relations with others. If you have a history of drug or alcohol abuse, taking opioids can cause a relapse.    Over time, opioids dont work as well. Most people will need higher and higher doses. The higher the dose, the more serious the side effects. We dont know the long-term effects of opioids.      Prescribed opioids aren't the best way to manage chronic pain    Other ways to manage pain include:      Ibuprofen or acetaminophen.  You should always try this first.      Treat health problems that may be causing pain.      acupuncture or massage, deep breathing, meditation, visual imagery, aromatherapy.      Use heat or ice at the pain site      Physical therapy and exercise      Stop smoking      See a counselor or therapist                                                  People who have used opioids for a long time may have a lower quality of life, worse depression, higher levels of pain and more visits to doctors.    Never share your opioids with others. Be sure to store opioids in a secure place, locked if possible.Young children can easily swallow them and overdose.     You can overdose on opioids.  Signs of overdose include decrease or loss of consciousness, slowed breathing, trouble waking and blue lips.  If someone is worried about overdose, they should call 911.    If you are at risk for overdose, you may get naloxone (Narcan, a medicine that reverses the effects of opioids.  If you overdose, a friend or family member can give you Narcan while waiting for the ambulance.  They need to know the signs of overdose and how to give Narcan.    While you're taking opioids:    Don't use alcohol or street drugs. Taking them together can cause death.    Don't take any of these  medicines unless your doctor says its okay.  Taking these with opioids can cause death.    Benzodiazepines (such as lorazepam         or diazepam)    Muscle relaxers (such as cyclobenzaprine)    sleeping pills    other opioids    Safe disposal of opioids  Find your area drug take-back program, your pharmacy mail-back program, buy a special disposal bag (such as Deterra) from your pharmacy or flush them down the toilet.  Use the guidelines at:  www.fda.gov/drugs/resourcesforyou

## 2021-06-20 NOTE — LETTER
Letter by Antonio Cota CNP at      Author: Antonio Cota CNP Service: -- Author Type: --    Filed:  Encounter Date: 9/4/2020 Status: (Other)         Christian Mclean  [place] [address]             September 4, 2020         Dear Mr. Edwards,    Below are the results from your recent visit:    Resulted Orders   Comprehensive Metabolic Panel   Result Value Ref Range    Sodium 143 136 - 145 mmol/L    Potassium 3.9 3.5 - 5.0 mmol/L    Chloride 105 98 - 107 mmol/L    CO2 21 (L) 22 - 31 mmol/L    Anion Gap, Calculation 17 5 - 18 mmol/L    Glucose 96 70 - 125 mg/dL    BUN 10 8 - 22 mg/dL    Creatinine 0.83 0.70 - 1.30 mg/dL    GFR MDRD Af Amer >60 >60 mL/min/1.73m2    GFR MDRD Non Af Amer >60 >60 mL/min/1.73m2    Bilirubin, Total 0.6 0.0 - 1.0 mg/dL    Calcium 9.0 8.5 - 10.5 mg/dL    Protein, Total 6.7 6.0 - 8.0 g/dL    Albumin 3.9 3.5 - 5.0 g/dL    Alkaline Phosphatase 229 (H) 45 - 120 U/L    AST 86 (H) 0 - 40 U/L    ALT 64 (H) 0 - 45 U/L    Narrative    Fasting Glucose reference range is 70-99 mg/dL per  American Diabetes Association (ADA) guidelines.   HIV Antigen/Antibody Screening Cascade   Result Value Ref Range    HIV Antigen / Antibody Negative Negative    Narrative    Method is Abbott HIV Ag/Ab for the detection of HIV p24 antigen, HIV-1 antibodies and HIV-2 antibodies.   Hepatitis C Antibody (Anti-HCV)   Result Value Ref Range    Hepatitis C Ab Negative Negative   Lipid Cascade RANDOM   Result Value Ref Range    Cholesterol 168 <=199 mg/dL    Triglycerides 77 <=149 mg/dL    HDL Cholesterol 102 >=40 mg/dL    LDL Calculated 51 <=129 mg/dL    Patient Fasting > 8hrs? Yes        Cholesterol levels are okay.  Normal kidney function.  Liver enzymes have risen a little bit.  This is likely a result of your known fatty liver disease.  I would recommend no alcohol and losing some weight in an effort to treat this.  Avoid using acetaminophen/Tylenol.    Please call with questions or  contact us using MyChart.    Sincerely,         Antonio Cota, CNP

## 2021-06-20 NOTE — LETTER
Letter by Bayron Sy MD at      Author: Bayron Sy MD Service: -- Author Type: --    Filed:  Encounter Date: 9/15/2020 Status: (Other)                   Office Hours: Monday - Friday 8:00 - 4:30PM    Christian Edwards  [address] [place] [address]           September 15, 2020      Dear Christian:    Our clinic records indicate we have attempted to contact you three (3) or more times to schedule a LAB ONLY follow up appointment. Unfortunately, we have been unable to reach you. To prevent further delays in your care, please contact our office to schedule your appointment.  [phone]       Sincerely,    [facility]

## 2021-06-20 NOTE — PROGRESS NOTES
ITP ASSESSMENT   Assessment Day: 90 Day  Session Number: 25  Precautions: Paralyzed diaphragm, shin pain  Diagnosis: MI;Stent  Risk Stratification: High  Referring Provider: Jeremías Manriquez*   ITP: Dr. Gomez  EXERCISE  Exercise Assessment: Reassessment                          Exercise Plan  Goals Next 30 days  ADL'S: Pt is tolerating all activities at home without complications other than the stairs due to the paralyzed diagram.   Leisure: Tolerate longer duration on the bike up to 12-15 min at home by increasing endurance in cardiac rehab. Utilize TM in cardiac rehab.   Work: Disabled    Education Goals: All goals in this section met  Education Goals Met: Patient can state cardiac s/s and appropriate emergency response.;Has system for taking medication.;Medication review.                        Goals Met  60 day ADL'S goals met: Pt is independent in ADL's except the stairs due to the paralyzed diagragm. Pt did do the stairs in cardiac rehab.   60 day Leisure goals met: Pt is able to tolerate walking at home for 20-30 min at a slower pace and has noticed an increase in upper body strength by utilizing the arm erg in cardiac rehab.   60 day Work goals met: Disabled  60 Day Progression: Pt has reached a 2.4 MET level on the Nustep-bikes for 20-30 minutes and has reached a 4.3 MET on the stairs for 2.5 minutes. Pt is limited by SOB due to paralyzed diagraphm and shin and LBP. Pt is utilizing arm bike in cardiac rehab. Increasing duration as tolerated.     30 day ADL'S goals met: Tolerates all home activites except for stairs due to diaphram not functioning properly  30 day Leisure goals met: Tolerates biking/ walking >10 min. x 2, 3-4x/week  30 day Work goals met: Disabled  30 Day Progression: Shin pain and diaphram not functioning properly are limiting factors.    Initial ADL's goals met: Pt continues to have difficulty with stair climbing due to diaphragm not functioning properly.  Pt states he is  tolerating all other home activities  Initial Leisure goals met: Pt is tolerating walking 3-4x/week for 10 min x2  Intial Work goals met: Disabled  Initial Progression: Pt states shin pain is limiting his walking duration.    Exercise Prescription  Exercise Mode: Nustep;Bike;Arm Erg.;Stairs;Hallway Walking;Treadmill (Trying the TM again)  Frequency: 2x/week  Duration: 40-50 minutes  Intensity / THR: 20-30 beats above resting heart rate  RPE 11-14  Progression / Met level: 2.5-4.3(Stairs)-4.3+  Resistive Training?: No (Declines in cardiac rehab. )    Current Exercise (mins/week): 80    Interventions  Home Exercise:  Mode: Walking, Biking  (Pt is looking into joining a gym with a Nustep)  Frequency: 3-4x/week  Duration: 20-25 min with walking and 10 min 1-2x/day on the bike    Education Material : Educational videos;Provide written material;Individual education and counseling;Offer educational classes    Education Completed  Exercise Education Completed: Cardiac Anatomy;Signs and Symptoms;Medication review;RPE;Emergency Plan;Home Exercise;Warm up/cool down;FITT Principles;BP/HR Reponse to exercise;Stretching;Strength training;Benefits of Exercise;End point of exercise            Exercise Follow-up/Discharge  Follow up/Discharge: Pt is walking and biking at home but is limited with his paralyzed diaphragm and shin pain and LBP is limiting. Pt is focusing more on duration at home than intensity. Nustep in cardiac rehab is the best modality for pt so he is looking into finding that has one to exercise in. Pt is motivated with exercise. Working on increasing duration in cardiac rehab on modalities and trying the TM. Continue to increase duration on the stairs as tolerated but again limited by the diaphragm. NUTRITION  Nutrition Assessment: Reassessment    Nutrition Risk Factors:  Nutrition Risk Factors: Dyslipidemia;Overweight    Nutrition Plan  Interventions  Diet Consult: Declined  Other Nutrition Intervention:  "Therapist/Pt Discussion;Diet Class;Provide with Written Material  Initial Rate Your Plate Score: 43  Follow-Up Rate Your Plate Score: 44    Education Completed  Nutrition Education Completed: Low Saturated fat diet;Risk factor overview;Low sodium diet    Goals  Nutrition Goals (Next 30 days): Patient will follow a low saturated fat diet    Goals Met  Nutrition Goals Met: Patient follows a low sodium diet;Patient knows appropriate portion size;Rate Your Plate Survey Score Improved;Patient can identify their risk factors for CAD    Height, Weight, and  BMI  Weight: 180 lb (81.6 kg)  Height: 5' 7\" (1.702 m)  BMI: 28.19    Nutrition Follow-up  Follow-up/Discharge: Pt is not interested in meeting with the dietician at this time. Pt has a good understanding of a heart healthy diet. Pt does follow low sodium and only cooks fresh foods and voids processed foods. Pt is working on portion control. Pt stays hydrated with water. Pt eats frequent small during the day. Pt does the cooking at home. Pt does not drink caffeine.        Other Risk Factors  Other Risk Factor Assessment: Reassessment    HTN Risk Factor: Hypertension    Pre Exercise BP: 118/58  Post Exercise BP: 110/58    Hypertension Plan  Goals  HTN Goals: Patient demonstrates understanding of HTN, no goals identified for the next 30 days    Goals Met  HTN Goals Met: Follow low sodium diet;Take medication as prescribed;Exercises regularly    HTN Interventions  HTN Interventions: Therapist/patient discussion;Provide written material;Offer educational videos;Offer educational classes    HTN Education Completed  HTN Education Completed: Low sodium diet;Medication review;Risk factor overview    Tobacco Risk Factor: NA    Risk Factor Follow-up   Follow-up/Discharge: Continue to increase in duration.   PSYCHOSOCIAL  Psychosocial Assessment: Reassessment       Psychosocial Risk Factor: NA    Psychosocial Plan  Interventions  Interventions: Offer educational videos and " classes;Provide written material;Individual education and counseling    Education Completed  Education Completed: Relaxation/Coping Techniques;S/S of depression;Effects of stress on body    Goals  Goals (Next 30 days): Improvement in [employer] score    Goals Met  Goals Met: Identified Support system;Oriented to stress management classes;Identify stressors;Practicing stress management skills    Psychosocial Follow-up  Follow-up/Discharge: Pt denies stress and feels he has a good support system in his family. Pt enjoys coming to cardiac rehab. Pt enjoys spending time with kids and grandchildren.            Patient involved in Goal setting?: Yes   LTG is 5 METS

## 2021-06-20 NOTE — PROGRESS NOTES
ITP ASSESSMENT   Assessment Day: 60 Day  Session Number: 17  Precautions: paralyzed diapragm, shin pain  Diagnosis: MI;Stent  Risk Stratification: High  Referring Provider: Jeremías Manriquez*   ITP sent to Dr. Gomez  EXERCISE  Exercise Assessment: Reassessment     6 Minute Walk Test   Pre   Pre Exercise HR: 82                  Pre Exercise BP: 134/70    Peak  Peak HR: 105                 Peak BP: 164/86  Peak feet: 1075  Peak O2 SAT: 96  Peak RPE: 15  Peak MPH: 2.04    Symptoms:  Peak Symptoms: leg fatigue/leg pain, mild shortness of breath    5 mins. Post  5 Min Post HR: 80  5 Min Post BP: 118/66                         Exercise Plan  Goals Next 30 days  ADL'S: Independent with all home activities except stairs due to diaphram not functioning well.  Leisure: Tolerate walking 20-30 min, 3-4x/week, continue to increase upper body strength by using arm erg in cardiac rehab  Work: Disabled    Education Goals: All goals in this section met  Education Goals Met: Patient can state cardiac s/s and appropriate emergency response.;Has system for taking medication.;Medication review.                        Goals Met  30 day ADL'S goals met: Tolerates all home activites except for stairs due to diaphram not functioning properly  30 day Leisure goals met: Tolerates biking/ walking >10 min. x 2, 3-4x/week  30 day Work goals met: Disabled  30 Day Progression: Shin pain and diaphram not functioning properly are limiting factors.    Initial ADL's goals met: Pt continues to have difficulty with stair climbing due to diaphragm not functioning properly.  Pt states he is tolerating all other home activities  Initial Leisure goals met: Pt is tolerating walking 3-4x/week for 10 min x2  Intial Work goals met: Disabled  Initial Progression: Pt states shin pain is limiting his walking duration.    Exercise Prescription  Exercise Mode: Treadmill;Bike;Nustep;Arm Erg.  Frequency: 2x/week  Duration: 35-45 min.  Intensity / THR: 20-30  "beats above resting heart rate  RPE 11-14  Progression / Met level: 2.5-3.5  Resistive Training?: No (declines to do weights in cardiac rehab)    Current Exercise (mins/week): 110    Interventions  Home Exercise:  Mode: walking, bike  Frequency: 3-4x/week  Duration: 15-20 min. 1-2x/day    Education Material : Provide written material;Individual education and counseling;Offer educational classes    Education Completed  Exercise Education Completed: Cardiac Anatomy;Signs and Symptoms;Medication review;RPE;Emergency Plan;Home Exercise;Warm up/cool down;FITT Principles            Exercise Follow-up/Discharge  Follow up/Discharge: Patient is walking or biking for home exercise. NUTRITION  Nutrition Assessment: Reassessment    Nutrition Risk Factors:  Nutrition Risk Factors: Dyslipidemia;Overweight    Nutrition Plan  Interventions  Diet Consult: Declined  Other Nutrition Intervention: Therapist/Pt Discussion  Initial Rate Your Plate Score: 43  Follow-Up Rate Your Plate Score: 44    Education Completed  Nutrition Education Completed: Low Saturated fat diet;Low sodium diet    Goals  Nutrition Goals (Next 30 days): Patient will follow a low saturated fat diet    Goals Met  Nutrition Goals Met: Patient follows a low sodium diet;Patient knows appropriate portion size;Patient states following a low saturated fat diet;Rate Your Plate Survey Score Improved    Height, Weight, and  BMI  Weight: 180 lb (81.6 kg)  Height: 5' 7\" (1.702 m)  BMI: 28.19    Nutrition Follow-up  Follow-up/Discharge: Patient not interested in meeting with the dietician.  Patient states that he watches portion sizes.       Other Risk Factors  Other Risk Factor Assessment: Reassessment    HTN Risk Factor: Hypertension    Pre Exercise BP: 118/70  Post Exercise BP: 114/60    Hypertension Plan  Goals  HTN Goals: Patient demonstrates understanding of HTN, no goals identified for the next 30 days    Goals Met  HTN Goals Met: Follow low sodium diet;Take medication " as prescribed;Exercises regularly    HTN Interventions  HTN Interventions: Therapist/patient discussion;Diet consult;Provide written material    HTN Education Completed  HTN Education Completed: Low sodium diet;Medication review;Risk factor overview    Tobacco Risk Factor: NA   PSYCHOSOCIAL  Psychosocial Assessment: Reassessment     Psychosocial Risk Factor: NA    Psychosocial Plan  Interventions  Interventions: Individual education and counseling    Education Completed  Education Completed: Relaxation/Coping Techniques    Goals  Goals (Next 30 days): Patient demonstrates understanding of stress, no goals identified for the next 30 days    Goals Met  Goals Met: Identified Support system;Practicing stress management skills    Psychosocial Follow-up  Follow-up/Discharge: Patient denies stress           Patient involved in Goal setting?: Yes    Signature: _____________________________________________________________    Date: __________________    Time: __________________

## 2021-06-21 NOTE — LETTER
Letter by Antonio Cota CNP at      Author: Antonio Cota CNP Service: -- Author Type: --    Filed:  Encounter Date: 12/10/2020 Status: (Other)         Christian Mclean  [place] [address]             December 10, 2020         Dear Mr. Edwards,    Below are the results from your recent visit:    Resulted Orders   US Abdomen Limited    Narrative    EXAM: US ABDOMEN LIMITED  LOCATION: [facility]  DATE/TIME: 12/9/2020 12:59 PM    INDICATION: hx etoh abuse  COMPARISON: CT 03/20/2020  TECHNIQUE: Limited abdominal ultrasound.    FINDINGS:    GALLBLADDER: Normal. No gallstones, wall thickening, or pericholecystic fluid. Negative sonographic Sinclair's sign.    BILE DUCTS: No biliary dilatation. The common duct measures 5 mm.    LIVER: Increased echogenicity from diffuse fatty infiltration. No focal mass.    RIGHT KIDNEY: No hydronephrosis.    PANCREAS: The visualized portions are normal.    No ascites.      Impression    1.  Gallbladder and bile ducts are normal.    2.  Mild diffuse hepatic steatosis.       Ultrasound looks okay but there is some buildup of fat on your liver.  This is likely a result of the alcohol use.  Over time this can lead to scarring which increases your risk of liver cancer.  Definitely try to work on cutting out alcohol from your diet to help your liver heal.    Please call with questions or contact us using [employer].    Sincerely,         Antonio Cota CNP

## 2021-06-21 NOTE — PROGRESS NOTES
ITP ASSESSMENT   Assessment Day: 120 Day  Session Number: 32  Precautions: Paralyzed diaphragm, shin pain  Diagnosis: MI;Stent  Risk Stratification: High  Referring Provider: Jeremías Manriquez*   ITP:Dr. Gomez  EXERCISE  Exercise Assessment: Reassessment                         Exercise Plan  Goals Next 30 days  ADL'S: All ADL goals met  Leisure: Consistently tolerate 15-20 min walks at home  Work: Disabled    Education Goals: All goals in this section met  Education Goals Met: Patient can state cardiac s/s and appropriate emergency response.;Has system for taking medication.;Medication review.                        Goals Met  90 day ADL'S goals met: Independent with all home activities  90 day Leisure goals met: Pt tolerating a little longer walks, not progressing on bike  90 day Work goals met: Disabled  90 Day Progress: Pt is plateauing.  Plans to complete 36 sessions.    60 day ADL'S goals met: Pt is independent in ADL's except the stairs due to the paralyzed diagragm. Pt did do the stairs in cardiac rehab.   60 day Leisure goals met: Pt is able to tolerate walking at home for 20-30 min at a slower pace and has noticed an increase in upper body strength by utilizing the arm erg in cardiac rehab.   60 day Work goals met: Disabled  60 Day Progression: Pt has reached a 2.4 MET level on the Nustep-bikes for 20-30 minutes and has reached a 4.3 MET on the stairs for 2.5 minutes. Pt is limited by SOB due to paralyzed diagraphm and shin and LBP. Pt is utilizing arm bike in cardiac rehab. Increasing duration as tolerated.     30 day ADL'S goals met: Tolerates all home activites except for stairs due to diaphram not functioning properly  30 day Leisure goals met: Tolerates biking/ walking >10 min. x 2, 3-4x/week  30 day Work goals met: Disabled  30 Day Progression: Shin pain and diaphram not functioning properly are limiting factors.    Initial ADL's goals met: Pt continues to have difficulty with stair climbing  due to diaphragm not functioning properly.  Pt states he is tolerating all other home activities  Initial Leisure goals met: Pt is tolerating walking 3-4x/week for 10 min x2  Intial Work goals met: Disabled  Initial Progression: Pt states shin pain is limiting his walking duration.    Exercise Prescription  Exercise Mode: Nustep;Bike;Arm Erg.;Stairs;Hallway Walking;Treadmill  Frequency: 2x/week  Duration: 40-50 min  Intensity / THR: 20-30 beats above resting heart rate  RPE 11-14  Progression / Met level: 2.5-4.3  Resistive Training?: No    Current Exercise (mins/week): 175    Interventions  Home Exercise:  Mode: walk, stationary bike  Frequency: 3-4x/week  Duration: 30-40 min total time    Education Material : Educational videos;Provide written material;Individual education and counseling;Offer educational classes    Education Completed  Exercise Education Completed: Cardiac Anatomy;Signs and Symptoms;Medication review;RPE;Emergency Plan;Home Exercise;Warm up/cool down;FITT Principles;BP/HR Reponse to exercise;Stretching;Strength training;Benefits of Exercise;End point of exercise            Exercise Follow-up/Discharge  Follow up/Discharge: Pt is walking and biking at home but is limited with his paralyzed diaphragm and shin pain and LBP is limiting. Pt is focusing more on duration at home than intensity. Nustep in cardiac rehab is the best modality for pt so he is looking into finding that has one to exercise in. Pt is motivated with exercise. Working on increasing duration in cardiac rehab on modalities and trying the TM. Continue to increase duration on the stairs as tolerated but again limited by the diaphragm. NUTRITION  Nutrition Assessment: Reassessment    Nutrition Risk Factors:  Nutrition Risk Factors: Dyslipidemia;Overweight    Nutrition Plan  Interventions  Diet Consult: Declined  Other Nutrition Intervention: Diet Class;Provide with Written Material;Therapist/Pt Discussion  Follow-Up Rate Your Plate  "Score: 44    Education Completed  Nutrition Education Completed: Low Saturated fat diet;Risk factor overview;Low sodium diet    Goals  Nutrition Goals (Next 30 days): Patient demonstrated understanding of cardiac nutrition, no goals identified for the next 30 days    Goals Met  Nutrition Goals Met: Patient follows a low sodium diet;Patient knows appropriate portion size;Rate Your Plate Survey Score Improved;Patient can identify their risk factors for CAD;Patient states following a low saturated fat diet    Height, Weight, and  BMI  Weight: 181 lb (82.1 kg)  Height: 5' 7\" (1.702 m)  BMI: 28.34    Nutrition Follow-up  Follow-up/Discharge: Pt reports he is doing better following low fat/low chol diet       Other Risk Factors  Other Risk Factor Assessment: Reassessment    HTN Risk Factor: Hypertension    Pre Exercise BP: 94/50  Post Exercise BP: 108/50    Hypertension Plan  Goals  HTN Goals: Patient demonstrates understanding of HTN, no goals identified for the next 30 days    Goals Met  HTN Goals Met: Follow low sodium diet;Take medication as prescribed;Exercises regularly    HTN Interventions  HTN Interventions: Therapist/patient discussion;Provide written material;Offer educational videos;Offer educational classes    HTN Education Completed  HTN Education Completed: Low sodium diet;Medication review;Risk factor overview    Tobacco Risk Factor: NA       PSYCHOSOCIAL  Psychosocial Assessment: Reassessment     Psychosocial Risk Factor: NA    Psychosocial Plan  Interventions  Interventions: Offer educational videos and classes;Provide written material;Individual education and counseling    Education Completed  Education Completed: Relaxation/Coping Techniques;S/S of depression;Effects of stress on body    Goals  Goals (Next 30 days): [facility] score    Goals Met  Goals Met: Identified Support system;Oriented to stress management classes;Identify stressors;Practicing stress management " skills    Psychosocial Follow-up  Follow-up/Discharge: Pt denies stress and feels he has a good support system in his family. Pt enjoys coming to cardiac rehab. Pt enjoys spending time with kids and grandchildren.            Patient involved in Goal setting?: Yes    Signature: _____________________________________________________________    Date: __________________    Time: __________________

## 2021-06-21 NOTE — PROGRESS NOTES
ITP ASSESSMENT   Assessment Day: 150 Day Discharge  Session Number: 36  Precautions: paralyzed diaphragm, shin pain  Diagnosis: MI;Stent  Risk Stratification: High  Referring Provider: Jeremías Manriquez*  EXERCISE  Exercise Assessment: Reassessment     6 Minute Walk Test   Pre   Pre Exercise HR: 81                  Pre Exercise BP: 112/58    Peak  Peak HR: 103                 Peak BP: 128/56  Peak feet: 800  Peak O2 SAT: 99  Peak RPE: 14  Peak MPH: 1.52    Symptoms:  Peak Symptoms: 5/10 bilateral shin pain    5 mins. Post  5 Min Post HR: 85  5 Min Post BP: 108/54                         Exercise Plan  Goals Next 30 days                        Goals Met  120 day ADL'S goals met: Goal met. All ADL goals met.  120 day Leisure goals met: Goal met. Pt is consistently tolerating 15-20 minute walks at home at a slow pace.  120 day Work goals met: Disabled  120 Day Progress: Pt has reached a MET level of 2.2 on the Nustep and 2.0 on the treadmill for a total time of  45 minutes. Pt is limited by shin and LBP.    90 day ADL'S goals met: Independent with all home activities  90 day Leisure goals met: Pt tolerating a little longer walks, not progressing on bike  90 day Work goals met: Disabled  90 Day Progress: Pt is plateauing.  Plans to complete 36 sessions.    60 day ADL'S goals met: Pt is independent in ADL's except the stairs due to the paralyzed diagragm. Pt did do the stairs in cardiac rehab.   60 day Leisure goals met: Pt is able to tolerate walking at home for 20-30 min at a slower pace and has noticed an increase in upper body strength by utilizing the arm erg in cardiac rehab.   60 day Work goals met: Disabled  60 Day Progression: Pt has reached a 2.4 MET level on the Nustep-bikes for 20-30 minutes and has reached a 4.3 MET on the stairs for 2.5 minutes. Pt is limited by SOB due to paralyzed diagraphm and shin and LBP. Pt is utilizing arm bike in cardiac rehab. Increasing duration as tolerated.     30 day  ADL'S goals met: Tolerates all home activites except for stairs due to diaphram not functioning properly  30 day Leisure goals met: Tolerates biking/ walking >10 min. x 2, 3-4x/week  30 day Work goals met: Disabled  30 Day Progression: Shin pain and diaphram not functioning properly are limiting factors.    Initial ADL's goals met: Pt continues to have difficulty with stair climbing due to diaphragm not functioning properly.  Pt states he is tolerating all other home activities  Initial Leisure goals met: Pt is tolerating walking 3-4x/week for 10 min x2  Intial Work goals met: Disabled  Initial Progression: Pt states shin pain is limiting his walking duration.    Exercise Prescription  Exercise Mode: Nustep;Bike;Arm Erg.;Stairs;Hallway Walking;Treadmill  Frequency: 2x/week  Duration: 40-50  Intensity / THR: 20-30 beats above resting heart rate  RPE 11-14  Progression / Met level: 2.5-4.3  Resistive Training?: No    Current Exercise (mins/week): 200    Interventions  Home Exercise:  Mode: walk, stationary bike  Frequency: 3-4x/week  Duration: 30-40 minutes total    Education Material : Educational videos;Provide written material;Individual education and counseling;Offer educational classes    Education Completed  Exercise Education Completed: Cardiac Anatomy;Signs and Symptoms;Medication review;RPE;Emergency Plan;Home Exercise;Warm up/cool down;FITT Principles;BP/HR Reponse to exercise;Stretching;Strength training;Benefits of Exercise;End point of exercise            Exercise Follow-up/Discharge  Follow up/Discharge: Pt is walking 15-20 minutes at home on a regular basis. Patient walks at a slow pace due to shin pain.  Pt also uses the stationary bike for 20-30 minutes. Pt is exercising 3-4 times per week. NUTRITION  Nutrition Assessment: Reassessment    Nutrition Risk Factors:  Nutrition Risk Factors: Dyslipidemia;Overweight  Cholesterol: 157  LDL: 71  HDL: 55  Triglycerides: 154    Nutrition  "Plan  Interventions  Diet Consult: Declined  Other Nutrition Intervention: Diet Class;Therapist/Pt Discussion;Provide with Written Material  Pre     Follow-Up Rate Your Plate Score: 44    Education Completed  Nutrition Education Completed: Low Saturated fat diet;Risk factor overview;Low sodium diet    Goals  Nutrition Goals (Next 30 days): Patient demonstrated understanding of cardiac nutrition, no goals identified for the next 30 days    Goals Met  Nutrition Goals Met: Patient follows a low sodium diet;Patient knows appropriate portion size;Rate Your Plate Survey Score Improved;Patient can identify their risk factors for CAD;Patient states following a low saturated fat diet    Height, Weight, and  BMI  Weight: 181 lb (82.1 kg)  Height: 5' 7\" (1.702 m)  BMI: 28.34  Pre BMI: 28.34     Nutrition Follow-up  Follow-up/Discharge: Pt is following a heart healthy diet.       Other Risk Factors  Other Risk Factor Assessment: Reassessment    HTN Risk Factor: Hypertension    Pre Exercise BP: 112/58  Post Exercise BP: 112/62    Hypertension Plan  Goals  HTN Goals: Patient demonstrates understanding of HTN, no goals identified for the next 30 days    Goals Met  HTN Goals Met: Follow low sodium diet;Take medication as prescribed;Exercises regularly    HTN Interventions  HTN Interventions: Therapist/patient discussion;Provide written material;Offer educational videos;Offer educational classes    HTN Education Completed  HTN Education Completed: Low sodium diet;Medication review;Risk factor overview    Tobacco Risk Factor: NA        Risk Factor Follow-up   Follow-up/Discharge: Continue to increase in duration.   PSYCHOSOCIAL  Psychosocial Assessment: Reassessment     [facility] COOP Q of L Summary Score: 28  Pre [facility] COOP Q of L Summary Score: 28     JAIMEE-D Score: 9  Pre JAIMEE-D Score: 9     Psychosocial Risk Factor: NA    Psychosocial Plan  Interventions  If JAIMEE-D > 15 send letter to MD  Interventions: Offer educational videos " and classes;Provide written material;Individual education and counseling    Education Completed  Education Completed: Relaxation/Coping Techniques;S/S of depression;Effects of stress on body    Goals  Goals (Next 30 days): Improvement in [facility] COOP score    Goals Met  Goals Met: Identified Support system;Oriented to stress management classes;Identify stressors;Practicing stress management skills    Psychosocial Follow-up  Follow-up/Discharge: Pt denies stress.           Patient involved in Goal setting?: Yes    Signature: _____________________________________________________________    Date: __________________    Time: __________________

## 2021-06-21 NOTE — PROGRESS NOTES
[facility] Heart Care Home Exercise Program/Discharge Summary  You have reached a 2.2 MET level and have completed 36 sessions of Cardiac Rehab.   Exercise Goals:   4-6x/week for aerobic exercise 30-60 minutes and 2-3x/week for strength training.   Modality Duration Intensity/  Rate of Perceived Exertion  (RPE: 11-14)   Warm-up 5 minutes 8-10   Walk 30 minutes 11-14   Bike 30 minutes 11-14   Cool Down 5 minutes 8-10   Stretching 5 minutes 8-10   Heart Rate Guidelines:   20-30bpm> RHR (Resting Heart Rate)   Special Recommendations:    Continue to follow low fat, low salt, heart healthy diet.    Continue to follow up with your doctors/providers as recommended (e.g cholesterol).    A well rounded exercise program will included aerobic/cardiovascular exercise (e.g like walking, biking, or swimming ), strength training (e.g. free weights, exercise bands, or weight machines) and stretching program.   Stop Exercise!!! If any of the following occur:    Angina/chest pain    Dizziness    Excessive perspiration/cold sweats    Abnormal shortness of breath    Changes in heart rate (slow, fast, irregular)    Sudden fatigue or numbness    Nausea  Also...    Avoid extreme temperatures - exercise indoors if necessary:   Temp+ Humidity >160, Temp-Wind Chill <20    Wait at least 1 hour after a meal before strenuous activity    Do not exercise if you have a fever or are ill    Wear comfortable, supportive athletic clothing and shoes.  You are now on your way to a heart healthy lifestyle on your own. You can do it!

## 2021-06-22 NOTE — TELEPHONE ENCOUNTER
Controlled Substance Refill Request  Medication Name:   Requested Prescriptions     Pending Prescriptions Disp Refills     codeine 30 MG tablet 60 tablet 0     Sig: Take 1 tablet (30 mg total) by mouth every 12 (twelve) hours as needed for pain.     Date Last Fill: 12/3/18  Pharmacy: [facility]      Submit electronically to pharmacy  Controlled Substance Agreement Date Scanned:   Encounter-Level CSA Scan Date:    There are no encounter-level csa scan date.       Last office visit with prescriber/PCP: 6/19/2018 Jeremías Manriquez MD OR same dept: 6/19/2018 Jeremías Manriquez MD OR same specialty: 6/19/2018 Jeremías Manriquez MD  Last physical: 6/2/2015 Last MTM visit: Visit date not found

## 2021-06-22 NOTE — TELEPHONE ENCOUNTER
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     - concurrent use of other controlled substances:  No, on 9MME per month   - I reviewed the most recent clinic note regarding this medication, dated: 6/19/18 but codeine not specifically addressed   - I reviewed the [place] Prescribers database regarding dispense pattern: no concerns- has been getting filled monthly since 2016   - I reviewed controlled substance agreement: N/A    Given the above, it appears that this refill is consistent with my partners thought process and prescribing pattern.    Refill sent to pharmacy and recommend PCP visit to create CSA.       Ekaterina Benjamin MD

## 2021-06-23 NOTE — TELEPHONE ENCOUNTER
RN cannot approve Refill Request    RN can NOT refill this medication med is not covered by policy/route to provider. Last office visit: Visit date not found Last Physical: Visit date not found Last MTM visit: Visit date not found Last visit same specialty: Visit date not found.  Next visit within 3 mo: Visit date not found  Next physical within 3 mo: Visit date not found      Karl Gómez, [facility] Triage/Med Refill 1/31/2019    Requested Prescriptions   Pending Prescriptions Disp Refills     codeine 30 MG tablet 60 tablet 0     Sig: Take 1 tablet (30 mg total) by mouth every 12 (twelve) hours as needed for pain.    There is no refill protocol information for this order

## 2021-06-23 NOTE — TELEPHONE ENCOUNTER
Refill Approved    Rx renewed per Medication Renewal Policy. Medication was last renewed on 3/26/2018.  Last OV 6/19/2018.    Debi Smith, [facility] Triage/Med Refill 2/4/2019     Requested Prescriptions   Pending Prescriptions Disp Refills     hydroCHLOROthiazide (HYDRODIURIL) 25 MG tablet [Pharmacy Med Name: HYDROCHLOROTHIAZIDE  25MG  TAB] 90 tablet 3     Sig: TAKE 1 TABLET BY MOUTH  DAILY    Diuretics/Combination Diuretics Refill Protocol  Passed - 2/1/2019  4:30 AM       Passed - Visit with PCP or prescribing provider visit in past 12 months    Last office visit with prescriber/PCP: 6/19/2018 Jeremías Manriquez MD OR same dept: 6/19/2018 Jeremías Manriquez MD OR same specialty: 6/19/2018 Jeremías Manriquez MD  Last physical: 6/2/2015 Last MTM visit: Visit date not found   Next visit within 3 mo: Visit date not found  Next physical within 3 mo: Visit date not found  Prescriber OR PCP: Jeremías Manriquez MD  Last diagnosis associated with med order: 1. Essential hypertension  - hydroCHLOROthiazide (HYDRODIURIL) 25 MG tablet [Pharmacy Med Name: HYDROCHLOROTHIAZIDE  25MG  TAB]; TAKE 1 TABLET BY MOUTH  DAILY  Dispense: 90 tablet; Refill: 3  - metoprolol succinate (TOPROL-XL) 100 MG 24 hr tablet [Pharmacy Med Name: METOPROLOL SUCC ER 100MG TABLET]; TAKE 1 TABLET BY MOUTH  DAILY  Dispense: 90 tablet; Refill: 3    If protocol passes may refill for 12 months if within 3 months of last provider visit (or a total of 15 months).            Passed - Serum Potassium in past 12 months     Lab Results   Component Value Date    Potassium 3.8 03/26/2018            Passed - Serum Sodium in past 12 months     Lab Results   Component Value Date    Sodium 140 03/26/2018            Passed - Blood pressure on file in past 12 months    BP Readings from Last 1 Encounters:   06/27/18 122/78            Passed - Serum Creatinine in past 12 months     Creatinine   Date Value Ref Range  Status   03/26/2018 0.68 (L) 0.70 - 1.30 mg/dL Final             metoprolol succinate (TOPROL-XL) 100 MG 24 hr tablet [Pharmacy Med Name: METOPROLOL SUCC ER 100MG TABLET] 90 tablet 3     Sig: TAKE 1 TABLET BY MOUTH  DAILY    Beta-Blockers Refill Protocol Passed - 2/1/2019  4:30 AM       Passed - PCP or prescribing provider visit in past 12 months or next 3 months    Last office visit with prescriber/PCP: 6/19/2018 Jeremías Manriquez MD OR same dept: 6/19/2018 Jeremías Manriquez MD OR same specialty: 6/19/2018 Jeremías Manriquez MD  Last physical: 6/2/2015 Last MTM visit: Visit date not found   Next visit within 3 mo: Visit date not found  Next physical within 3 mo: Visit date not found  Prescriber OR PCP: Jeremías Manriquez MD  Last diagnosis associated with med order: 1. Essential hypertension  - hydroCHLOROthiazide (HYDRODIURIL) 25 MG tablet [Pharmacy Med Name: HYDROCHLOROTHIAZIDE  25MG  TAB]; TAKE 1 TABLET BY MOUTH  DAILY  Dispense: 90 tablet; Refill: 3  - metoprolol succinate (TOPROL-XL) 100 MG 24 hr tablet [Pharmacy Med Name: METOPROLOL SUCC ER 100MG TABLET]; TAKE 1 TABLET BY MOUTH  DAILY  Dispense: 90 tablet; Refill: 3    If protocol passes may refill for 12 months if within 3 months of last provider visit (or a total of 15 months).            Passed - Blood pressure filed in past 12 months    BP Readings from Last 1 Encounters:   06/27/18 122/78

## 2021-06-24 NOTE — TELEPHONE ENCOUNTER
Controlled Substance Refill Request  Medication Name:   Requested Prescriptions     Pending Prescriptions Disp Refills     codeine 30 MG tablet 60 tablet 0     Sig: Take 1 tablet (30 mg total) by mouth every 12 (twelve) hours as needed for pain.     Date Last Fill: 2/1/19  [facility]      Submit electronically to pharmacy  Controlled Substance Agreement Date Scanned:   Encounter-Level CSA Scan Date:    There are no encounter-level csa scan date.       Last office visit with prescriber/PCP: 6/19/2018 Jeremías Manriquez MD OR same dept: 6/19/2018 Jeremías Manriquez MD OR same specialty: 6/19/2018 Jeremías Manriquez MD  Last physical: 6/2/2015 Last MTM visit: Visit date not found

## 2021-06-25 NOTE — TELEPHONE ENCOUNTER
Refill sent. Please call the patient. Due for follow-up. It also looks like his blood pressure was extremely high with oncology so we should address this.    Antonio Cota, CNP

## 2021-06-25 NOTE — PROGRESS NOTES
Progress Notes by Gilmer Louis MD at 5/25/2017  1:30 PM     Author: Gilmer Louis MD Service: -- Author Type: Physician    Filed: 5/25/2017  2:22 PM Encounter Date: 5/25/2017 Status: Signed    : Gilmer Louis MD (Physician)       THORACIC SURGERY OFFICE NEW PATIENT VISIT      Dear Dr. Manriquez,    I saw Mr. Edwards at Dr. Arreola request in consultation for the evaluation and treatment of RIGHT hemidiaphragm paralysis.     HPI  Mr. Christian Edwards is a 61 y.o. year-old male with progressive dyspnea on exertion, orthopnea, and dyspnea when bending over. He has gained some weight over the years, but has been stable for the past 2-3 years. No clear triggering factors for diaphragm paralysis    Tests   CT chest 2004: RIGHT hemidiaphragm elevation      CXR 2011: RIGHT hemidiaphragm elevation, most noticeable on lateral view    Sniff test (4/24/2017): RIGHT hemidiaphragm is elevated, both diaphragms move with sniff, but decreased motion of right side      Chest CT (5/29/2017): mild parenchymal changes suggestive of interstitial lung disease, questionnable RIGHT hemidiaphragm elevation        PFT (5/17/2017): FVC 70% and FEV1  75% (same as 2015), FIFmax 26% (lower than 2015), DLCO 74% (stable)    PMH    Past Medical History:   Diagnosis Date   ? Acute Myocardial Infarction     Created by Conversion    ? Alcoholic Hepatitis     Created by Conversion    ? Angioedema     Created by Conversion    ? Benign Tubulovillous Adenoma Of The Large Intestine     Created by Conversion    ? Coronary Artery Disease     Created by Conversion [facility] Annotation: Jun 9 2008  3:15PM - Tammy Flowers: [place] May 2002    ? Coronary Artery Disease     Created by Conversion [facility] Annotation: Jun 9 2008  3:15PM - Tammy Flowers: [place] May 2002    ? Cough     Created by Conversion    ? Diaphragmatic Paralysis     Created by Conversion [facility] Annotation: Dec 10 2009 11:10AM - Tammy Flowers: [email or web address]. 2004.  No clear  etiology    ? Diaphragmatic Paralysis     Created by Conversion [facility] Annotation: Dec 10 2009 11:10AM - Tammy Flowers: [email or web address]. 2004.  No clear etiology    ? Feeling Full Before Finishing Meals (Early Satiety)     Created by Conversion    ? Hyperlipidemia     Created by Conversion    ? Lactase Deficiency Syndrome     Created by Conversion    ? Lower Back Pain     Created by Conversion    ? Lower Back Pain     Created by Conversion    ? 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      IMPRESSION   1. Diaphragm paralysis        61 y.o. year-old male with RIGHT hemidiaphragm paralysis or eventration    PLAN  I spent a total of 30 minutes with Mr. Edwards, more than 50% of which were spent in counseling, coordination of care, and face-to-face time. I reviewed the plan as follows:    1) Procedure planned: we talked about laparoscopic/VATS plication. He needs some time to think about it.    2) Alcohol cessation 3 weeks prior to surgery  3) Return to clinic with new PA/lateral CXR to revisit surgery if he is interested    They had all their questions answered and were in agreement with the plan.  I appreciate the opportunity to participate in the care of your patient and will keep you updated.    Sincerely,

## 2021-06-25 NOTE — TELEPHONE ENCOUNTER
Pt calling to request a refill of his potassium chloride 20 mg, to be sent to [facility].    Pt states that he is not taking 1 tablet daily, but instead he is taking 1 tablet every other day. 1 Tab every other day.    Refill request routed to the provider's team for review, based on the pt not taking the medication as directed.  Unsure if a lab test would be required.  Pratima Grigsby RN 06/01/21 2:00 PM  [facility] Nurse Advisor

## 2021-06-25 NOTE — TELEPHONE ENCOUNTER
Called pt and notified of message below. Pt agrees and appointment scheduled for tomorrow with pcp.    Meliza Vaughn, CMA

## 2021-06-26 NOTE — PATIENT INSTRUCTIONS - HE
Blood tests rechecking potassium and also assessing for that blood clot.  If the D-dimer test comes back positive, we'll need to get that CT of the chest to rule out a blood clot.  If negative, then this is likely that pleural rub we talked about.    Blood pressure today is slowly getting better but still too high.  I sent a new medicine called amlodipine which she can start taking daily.  Take a half tab daily with your other medicines for 2-4 weeks and then come back for nurse only blood pressure check.  If still elevated, then increase to the full tablet.    If the breathing issues get worse, we need to reconnect you with the pulmonologist.    You are due for follow-up with your cardiologist.

## 2021-06-28 NOTE — PROGRESS NOTES
Progress Notes by Claudette Joyec MD at 11/18/2019  4:10 PM     Author: Claudette Joyce MD Service: -- Author Type: Physician    Filed: 11/18/2019  5:15 PM Encounter Date: 11/18/2019 Status: Signed    : Claudette Joyce MD (Physician)           Click to link to [facility]     [facility] HEART CARE NOTE       Assessment/Plan:   1.  3V Coronary artery disease s/p 5 AMARJIT to LAD/LCX/RCA: No chest pain.  His shortness of breath is a stable, related to hemidiaphragmatic paralyzes. Small branch OM3  and 90% calcified PLV are treated medically. Continue current medication aspirin, metoprolol XL, Lipitor, isosorbide mononitrate.    2.  Mild ischemic cardiomyopathy, LVEF of 47%: The patient has no signs of congestive heart failure.  No fluid retention.  The patient is not on diuretics.  Continue metoprolol  mg daily, add lisinopril 10 mg daily for better blood pressure control and also ischemic cardiomyopathy.    3.  Essential hypertension: His blood pressure is high.  Continue metoprolol  mg daily, added lisinopril 10 mg daily for better blood pressure control.    4.  Dyslipidemia: The patient was on Lipitor 80 mg at bedtime, but only has 20 mg of tablets.  Represcribed his Lipitor 40 mg at bedtime.  His LDL is controlled, 56.    5.  DVT and pulmonary embolism: On anticoagulation Xarelto 20 mg daily.    6.  Colon cancer: Follow-up with Dr. Sy.    7.  Obstructive sleep apnea, hemidiaphragmatic paralyzes: Please see Dr. Browne's note for details.    Thank you for the opportunity to be involved in the care of Christian Edwards. If you have any questions, please feel free to contact me.  I will see the patient again in 6 months and as needed.    Much or all of the text in this note was generated through the use of Dragon Dictate voice-to-text software. Errors in spelling or words which seem out of context are unintentional.   Sound alike errors, in particular, may have escaped editing.       History of  Present Illness:   It is my pleasure to see Christian Edwards at the [facility] clinic for evaluation of Follow-up.  Christian Edwards is a 64 y.o. male with a medical history of coronary artery disease status post MI and AMARJIT to LAD, LCX and RCA, essential hypertension, dyslipidemia, MURALI, hemidiaphragmatic paralyzes, alcoholic hepatitis, colon cancer status post surgical treatment, no chemotherapy, DVT and pulmonary embolism on anticoagulation.    The patient states that he has been doing quite well from a cardiology standpoint.  He had no chest pain.  His shortness of breath has been stable.  No worsening shortness of breath over last 2 to 3 months.  He had no palpitations, dizziness, orthopnea, PND or leg edema.  He has been taking his medication regularly.  He has no side effects from his current medications.  His blood pressure is mildly high.  His heart rate are in normal range.    Past Medical History:     Patient Active Problem List   Diagnosis   ? Diaphragmatic Paralysis   ? Alcoholic Hepatitis   ? Alcohol abuse   ? NSTEMI (non-ST elevated myocardial infarction) (H)   ? Insomnia   ? Hyperlipidemia   ? Coronary artery disease due to calcified coronary lesion   ? Lumbar Disc Degeneration   ? Lower Back Pain   ? Abnormal Glucose   ? Cough   ? Benign Tubulovillous Adenoma Of The Large Intestine   ? Lactase Deficiency Syndrome   ? Obstructive sleep apnea   ? Hypoventilation   ? H/O non-ST elevation myocardial infarction (NSTEMI)   ? Hx of heart artery stent   ? Anemia   ? Coronary artery disease involving native coronary artery of native heart without angina pectoris   ? Dyspnea on exertion   ? Essential hypertension   ? Dyslipidemia   ? Pulmonary fibrosis (H)   ? Other acute pulmonary embolism without acute cor pulmonale (H)   ? Deep vein thrombosis (DVT) of popliteal vein of left lower extremity, unspecified chronicity (H)   ? Hypomagnesemia   ? Unstable angina (H)   ? Primary adenocarcinoma of  ascending colon (H)       Past Surgical History:     Past Surgical History:   Procedure Laterality Date   ? CARDIAC SURGERY      five stents   ? COLECTOMY N/A 8/19/2019    Procedure: RIGHT COLECTOMY, OPEN AND UMBILICAL HERNIA REPAIR;  Surgeon: Epifanio Black MD;  Location: [facility];  Service: General   ? COLONOSCOPY N/A 8/13/2019    Procedure: COLONOSCOPY WITH BIOPSY;  Surgeon: Antonio Rajput MD;  Location: [facility];  Service: Gastroenterology   ? CV CORONARY ANGIOGRAM N/A 8/12/2019    Procedure: Coronary Angiogram;  Surgeon: Eddie Rocha MD;  Location: [facility];  Service: Cardiology   ? CV LEFT HEART CATHETERIZATION WO LEFT VETRICULOGRAM Left 8/12/2019    Procedure: Left Heart Catheterization Without Left Ventriculogram;  Surgeon: Eddie Rocha MD;  Location: [facility];  Service: Cardiology   ? IR IVC FILTER PLACEMENT  8/9/2019   ? IR REMOVAL IVC FILTER  10/4/2019   ? PICC AND MIDLINE TEAM LINE INSERTION  8/12/2019        ? stemi         Family History:     Family History   Problem Relation Age of Onset   ? Sleep apnea Brother    ? COPD Mother    ? Heart disease Father    ? No Medical Problems Sister         Social History:    reports that he quit smoking about 17 years ago. He smoked 0.00 packs per day. He has never used smokeless tobacco. He reports current alcohol use of about 14.0 standard drinks of alcohol per week. He reports current drug use. Drug: Marijuana.    Review of Systems:   General: WNL  Eyes: WNL  Ears/Nose/Throat: WNL  Lungs: WNL  Heart: Shortness of Breath with activity  Stomach: Diarrhea  Bladder: WNL  Muscle/Joints: WNL  Skin: WNL  Nervous System: WNL  Mental Health: WNL     Blood: Easy Bruising    Meds:     Current Outpatient Medications:   ?  acetaminophen (TYLENOL) 500 MG tablet, Take 1-2 tablets (500-1,000 mg total) by mouth every 4 (four) hours as needed., Disp: , Rfl: 0  ?  aspirin 81 MG EC tablet, Take 1 tablet (81 mg  "total) by mouth daily., Disp: , Rfl: 0  ?  atorvastatin (LIPITOR) 20 MG tablet, TAKE 1 TABLET BY MOUTH AT  BEDTIME, Disp: 90 tablet, Rfl: 3  ?  codeine 30 MG tablet, Take 1 tablet (30 mg total) by mouth every 8 (eight) hours as needed for pain., Disp: 90 tablet, Rfl: 0  ?  ferrous sulfate 325 (65 FE) MG tablet, Take 1 tablet (325 mg total) by mouth daily with breakfast. Take with orange juice., Disp: 90 tablet, Rfl: 1  ?  metoprolol succinate (TOPROL-XL) 100 MG 24 hr tablet, TAKE 1 TABLET BY MOUTH  DAILY, Disp: 90 tablet, Rfl: 3  ?  multivitamin (MULTIVITAMIN) per tablet, Take 1 tablet by mouth daily., Disp: , Rfl:   ?  nitroglycerin (NITROSTAT) 0.4 MG SL tablet, Place 0.4 mg under the tongue every 5 (five) minutes as needed.    , Disp: , Rfl:   ?  pantoprazole (PROTONIX) 40 MG tablet, Take 1 tablet by mouth daily., Disp: , Rfl:   ?  rivaroxaban (XARELTO) 15 mg (42)- 20 mg (9) dosepak, Take 1 tablet (15mg) po bid for 21 days then take 1 tablet (20mg) daily, Disp: 51 tablet, Rfl: 0  ?  rivaroxaban (XARELTO) 20 mg tablet, Take 1 tablet (20 mg total) by mouth daily., Disp: 90 tablet, Rfl: 0  ?  atorvastatin (LIPITOR) 80 MG tablet, Take 1 tablet (80 mg total) by mouth at bedtime., Disp: 30 tablet, Rfl: 0  ?  isosorbide dinitrate (ISORDIL) 10 MG tablet, Take 1 tablet (10 mg total) by mouth 3 (three) times a day at 8:00 am, 12:00 noon and 6:00 pm., Disp: 270 tablet, Rfl: 1  ?  lisinopril (PRINIVIL,ZESTRIL) 10 MG tablet, Take 1 tablet (10 mg total) by mouth daily., Disp: 90 tablet, Rfl: 3  ?  omeprazole (PRILOSEC) 40 MG capsule, TAKE 1 CAPSULE BY MOUTH  DAILY, Disp: 90 capsule, Rfl: 0    Allergies:   Patient has no known allergies.      Objective:      Physical Exam  181 lb (82.1 kg)  5' 5.75\" (1.67 m)  Body mass index is 29.44 kg/m .  /74 (Patient Site: Left Arm, Patient Position: Sitting, Cuff Size: Adult Regular)   Pulse 68   Resp 16   Ht 5' 5.75\" (1.67 m)   Wt 181 lb (82.1 kg)   BMI 29.44 kg/m      General " Appearance:   Awake, Alert, No acute distress.   HEENT:  Pupil equal and reactive to light. No scleral icterus; the mucous membranes were moist.   Neck: No cervical bruits. No JVD. No thyromegaly.     Chest: The spine was straight. The chest was symmetric.   Lungs:   Respirations unlabored; Lungs are clear to auscultation. No crackles. No wheezing.   Cardiovascular:   Regular rhythm and rate, normal first and second heart sounds with no murmurs. No rubs or gallops.    Abdomen:  Soft. No tenderness. Non-distended. Bowels sounds are present   Extremities: Equal tibial pulses. No leg edema.   Skin: No rashes or ulcers. Warm, Dry.   Musculoskeletal: No tenderness. No deformity.   Neurologic: Mood and affect are appropriate. No focal deficits.         EKG: Personally reviewed  Sinus tachycardia  Nonspecific ST abnormality  Abnormal ECG    Cardiac Imaging Studies  ECHO on 8-8-2019:    Normal left ventricular size and systolic function.    Left ventricle ejection fraction is normal. The calculated left ventricular ejection fraction is 55%.    Normal right ventricular size and systolic function. TAPSE is normal, which is consistent with normal right ventricular systolic function.    No hemodynamically significant valvular heart abnormalities.    Pulmonary artery pressure could not be obtained.    No previous study for comparison.    ECHO on 8-:    When compared to the previous study dated 8/8/2019, anterior and apical hypokinesis is now seen    Left ventricle ejection fraction is mildly decreased. The calculated left ventricular ejection fraction is 47%.    Mild to distal anterior, anteroseptal and apical hypokinesis    Normal right ventricular size and systolic function.    No hemodynamically significant valvular heart abnormalities.    Coronary angiogram on 8-:  LM:no obstruction   LAD:Ca2+, patent proximal and distal stents, mid-vessel at take off of a D2 has Ca2+ ~60% narrowing but AVELINO 3 flow  Lcx:patent  proximal and OM3 stents, but a small inferior subdivision of an OM3 appears occluded consistent w/  w/ faint L-L collaterals  RCA:patent proximal and mid-vessel stents, diffuse Ca2+, Ca2+ 90% PLV narrowing but AVELINO 3 flow     LVEDP:24    Lab Review   Lab Results   Component Value Date     08/30/2019    K 4.6 08/30/2019     08/30/2019    CO2 25 08/30/2019    BUN 7 (L) 08/30/2019    CREATININE 0.74 10/04/2019    CALCIUM 9.5 08/30/2019     Lab Results   Component Value Date    WBC 6.8 08/30/2019    HGB 10.2 (L) 10/14/2019    HCT 25.4 (L) 08/30/2019    MCV 69 (L) 08/30/2019     (H) 08/30/2019     Lab Results   Component Value Date    CHOL 104 08/07/2019    CHOL 176 03/26/2018    CHOL 219 (H) 12/20/2016     Lab Results   Component Value Date    HDL 55 08/07/2019    HDL 67 03/26/2018    HDL 71 12/20/2016     Lab Results   Component Value Date    LDLCALC 36 08/07/2019    LDLCALC 95 03/26/2018    LDLCALC 130 (H) 12/20/2016     Lab Results   Component Value Date    TRIG 65 08/07/2019    TRIG 70 03/26/2018    TRIG 91 12/20/2016     No components found for: CHOLHDL  Lab Results   Component Value Date    TROPONINI 2.85 (HH) 08/12/2019     Lab Results   Component Value Date     (H) 08/07/2019     Lab Results   Component Value Date    TSH 0.43 08/06/2010

## 2021-07-02 NOTE — H&P
H&P by Shelia Michelle NP at 10/4/2019  9:45 AM     Author: Shelia Michelle NP Service: Interventional Radiology Author Type: Nurse Practitioner    Filed: 10/4/2019  9:28 AM Date of Service: 10/4/2019  9:45 AM Status: Signed    : Shelia Michelle NP (Nurse Practitioner)         Interventional Radiology - History and Physical  10/4/2019    Procedure Requested: IVC filter removal   Requesting Provider: Dr. Sy     HPI: Christian Edwards is a 64 y.o. old male with a history of alcoholic hepatitis, polysubstance abuse, CAD, MURALI, recent DVT/PE in the setting of a new diagnosis of colon cancer and is s/p retrievable IVC filter placement 8/9/19 prior to right hemicolectomy 8/19/19 as anticoagulation was contraindicated.     Patient presents back for IVC filter removal today as anticoagulation is no longer contraindicated.  He has been maintained on Xarelto.      Patient reports recovering well from surgery.  Denies any other recent illnesses or fevers.      Imaging:   IR Retrievable IVC Filter Placement 8/9/19:    IMPRESSION:   Successful placement of a retrievable inferior vena cava filter, as discussed above.  PLAN:  A retrievable inferior vena cava filter was placed. Once the filter is no longer medically necessary please contact the department of interventional radiology ([phone]) to arrange for removal.    NPO Status: Midnight   Anticoagulation/Antiplatelets/Bleeding tendencies: Aspirin, Plavix, Xarelto   Antibiotics: None as it pertains to this procedure    Review of Systems: A comprehensive 10-point review of systems was performed. All systems were reviewed and negative with exception to those reported in the HPI.    PMH:  Past Medical History:   Diagnosis Date   ? Abnormal CT of the abdomen 8/12/2019    Added automatically from request for surgery [id number]   ? Acute chest pain 8/12/2019   ? Acute Myocardial Infarction     Created by Conversion    ? Alcoholic Hepatitis     Created by Conversion    ?  Angioedema     Created by Conversion    ? Benign Tubulovillous Adenoma Of The Large Intestine     Created by Conversion    ? Coronary Artery Disease     Created by Conversion [facility] Annotation: Jun 9 2008  3:15PM - Tammy Flowers: [place] May 2002    ? Cough     Created by Conversion    ? Diaphragmatic Paralysis     Created by Conversion [facility] Annotation: Dec 10 2009 11:10AM - Tammy Flowers: [email or web address]. 2004.  No clear etiology    ? DVT (deep venous thrombosis) (H)    ? Feeling Full Before Finishing Meals (Early Satiety)     Created by Conversion    ? Hyperlipidemia     Created by Conversion    ? Lactase Deficiency Syndrome     Created by Conversion    ? Lower Back Pain     Created by Conversion    ? PE (pulmonary thromboembolism) (H)    ? Pre-operative cardiovascular examination 6/3/2015   ? Rectal bleeding 8/8/2019   ? Sleep apnea     CPAP       PSH:  Past Surgical History:   Procedure Laterality Date   ? CARDIAC SURGERY      five stents   ? COLECTOMY N/A 8/19/2019    Procedure: RIGHT COLECTOMY, OPEN AND UMBILICAL HERNIA REPAIR;  Surgeon: Epifanio Black MD;  Location: [facility];  Service: General   ? COLONOSCOPY N/A 8/13/2019    Procedure: COLONOSCOPY WITH BIOPSY;  Surgeon: Antonio Rajput MD;  Location: [facility] OR;  Service: Gastroenterology   ? CV CORONARY ANGIOGRAM N/A 8/12/2019    Procedure: Coronary Angiogram;  Surgeon: Eddie Rocha MD;  Location: [facility] Cath Lab;  Service: Cardiology   ? CV LEFT HEART CATHETERIZATION WO LEFT VETRICULOGRAM Left 8/12/2019    Procedure: Left Heart Catheterization Without Left Ventriculogram;  Surgeon: Eddie Rocha MD;  Location: [facility] Cath Lab;  Service: Cardiology   ? IR IVC FILTER PLACEMENT  8/9/2019   ? PICC AND MIDLINE TEAM LINE INSERTION  8/12/2019        ? stemi         ALLERGIES  Patient has no known allergies.    MEDICATIONS:  Current Outpatient Medications on File Prior to Encounter   Medication Sig Dispense  "Refill   ? acetaminophen (TYLENOL) 500 MG tablet Take 1-2 tablets (500-1,000 mg total) by mouth every 4 (four) hours as needed.  0   ? aspirin 81 MG EC tablet Take 1 tablet (81 mg total) by mouth daily.  0   ? atorvastatin (LIPITOR) 20 MG tablet TAKE 1 TABLET BY MOUTH AT  BEDTIME 90 tablet 3   ? atorvastatin (LIPITOR) 80 MG tablet Take 1 tablet (80 mg total) by mouth at bedtime. 30 tablet 0   ? clopidogrel (PLAVIX) 75 mg tablet TAKE 1 TABLET BY MOUTH  DAILY. NO MORE REFILLS TILL SEEN. 90 tablet 0   ? codeine 30 MG tablet Take 1 tablet (30 mg total) by mouth every 8 (eight) hours as needed for pain. 90 tablet 0   ? ferrous sulfate 325 (65 FE) MG tablet Take 1 tablet (325 mg total) by mouth daily with breakfast. Take with orange juice. 90 tablet 1   ? isosorbide dinitrate (ISORDIL) 10 MG tablet Take 1 tablet (10 mg total) by mouth 3 (three) times a day at 8:00 am, 12:00 noon and 6:00 pm. 270 tablet 1   ? metoprolol succinate (TOPROL-XL) 100 MG 24 hr tablet TAKE 1 TABLET BY MOUTH  DAILY 90 tablet 3   ? multivitamin (MULTIVITAMIN) per tablet Take 1 tablet by mouth daily.     ? nitroglycerin (NITROSTAT) 0.4 MG SL tablet Place 0.4 mg under the tongue every 5 (five) minutes as needed.            ? omeprazole (PRILOSEC) 40 MG capsule TAKE 1 CAPSULE BY MOUTH  DAILY 90 capsule 0   ? rivaroxaban (XARELTO) 15 mg (42)- 20 mg (9) dosepak Take 1 tablet (15mg) po bid for 21 days then take 1 tablet (20mg) daily 51 tablet 0     No current facility-administered medications on file prior to encounter.        LABS:  INR (no units)   Date Value   08/25/2019 1.26 (H)     Hemoglobin (g/dL)   Date Value   08/30/2019 8.0 (L)     Platelets (thou/uL)   Date Value   08/30/2019 477 (H)     Creatinine (mg/dL)   Date Value   08/30/2019 0.74     Potassium (mmol/L)   Date Value   08/30/2019 4.6          EXAM:  /69   Pulse 68   Temp 98.3  F (36.8  C) (Oral)   Resp 16   Ht 5' 5.75\" (1.67 m)   Wt 171 lb (77.6 kg)   SpO2 97%   BMI 27.81 " kg/m    General: Stable. In no acute distress  Neuro: A&O x3.  Moves all extremities equally.  Resp: Lungs CTA bilaterally.  Cardio: S1S2 and reg, without murmur, clicks or rubs.  MSK:  No gross motor weakness.  Sensation intact.        Pre-Sedation Assessment:  Mallampati Airway Classification: Class 2: upper half of tonsil fossa visible  Previous reaction to anesthesia/sedation: no  Sedation plan based on assessment: Moderate  Sleep Apnea: yes  Dentures: no  COPD: no  ASA Classification: ASA 3 - Patient with moderate systemic disease with functional limitations  Comments: no hx of asthma       ASSESSMENT:  64 y.o. old male with recent DVT/PE in the setting of a new diagnosis of colon cancer and is s/p retrievable IVC filter placement 8/9/19 prior to right hemicolectomy 8/19/19 as anticoagulation was contraindicated.     Patient presents back for IVC filter removal today as anticoagulation is no longer contraindicated.  He has been maintained on Xarelto.        PLAN:    Retrievable IVC filter removal with sedation.      The procedure, risks and moderate sedation were discussed with patient, all questions answered and patient agrees to proceed with the procedure.  Written consent obtained.      Shelia AUGUSTIN, CNP  Interventional Radiology  [phone]

## 2021-07-03 NOTE — ADDENDUM NOTE
Addendum Note by Maribell Cota CNP at 11/18/2020  4:07 PM     Author: Maribell Cota CNP Service: -- Author Type: Nurse Practitioner    Filed: 11/18/2020  4:07 PM Encounter Date: 11/18/2020 Status: Signed    : Maribell Cota CNP (Nurse Practitioner)    Addended by: MARIBELL COTA on: 11/18/2020 04:07 PM        Modules accepted: Orders

## 2021-07-03 NOTE — ADDENDUM NOTE
Addendum Note by Maribell Cota CNP at 12/4/2020 11:00 AM     Author: Maribell Cota CNP Service: -- Author Type: Nurse Practitioner    Filed: 12/8/2020  8:10 AM Encounter Date: 12/4/2020 Status: Signed    : Maribell Cota CNP (Nurse Practitioner)    Addended by: MARIBELL COTA on: 12/8/2020 08:10 AM        Modules accepted: Orders

## 2021-07-04 NOTE — ADDENDUM NOTE
Addendum Note by Maribell Cota CNP at 6/3/2021 12:40 PM     Author: Maribell Cota CNP Service: -- Author Type: Nurse Practitioner    Filed: 6/4/2021  7:37 PM Encounter Date: 6/3/2021 Status: Signed    : Maribell Cota CNP (Nurse Practitioner)    Addended by: MARIBELL COTA on: 6/4/2021 07:37 PM        Modules accepted: Orders

## 2021-07-06 VITALS
DIASTOLIC BLOOD PRESSURE: 76 MMHG | HEART RATE: 94 BPM | BODY MASS INDEX: 31.16 KG/M2 | HEIGHT: 65 IN | TEMPERATURE: 98.6 F | OXYGEN SATURATION: 96 % | SYSTOLIC BLOOD PRESSURE: 178 MMHG | WEIGHT: 187 LBS

## 2021-07-14 PROBLEM — I20.0 UNSTABLE ANGINA (H): Status: RESOLVED | Noted: 2019-08-12 | Resolved: 2021-06-03

## 2021-07-14 PROBLEM — I25.2 H/O NON-ST ELEVATION MYOCARDIAL INFARCTION (NSTEMI): Status: RESOLVED | Noted: 2018-06-19 | Resolved: 2021-06-03

## 2021-07-14 PROBLEM — K62.5 RECTAL BLEEDING: Status: RESOLVED | Noted: 2019-08-08 | Resolved: 2019-08-30

## 2021-08-03 DIAGNOSIS — E87.6 HYPOKALEMIA: ICD-10-CM

## 2021-08-04 DIAGNOSIS — I10 BENIGN ESSENTIAL HYPERTENSION: ICD-10-CM

## 2021-08-05 DIAGNOSIS — I10 ESSENTIAL HYPERTENSION: ICD-10-CM

## 2021-08-05 NOTE — TELEPHONE ENCOUNTER
Fax request from [facility]. There are two other refill request for this pt as well.    Refill request for medication: metoprolol succinate (TOPROL-XL) 100 MG 24 hr tablet  Last refill on 11/20/2020, quantity #90, 2 refills    Appointment: No appointment made.   Appointment recommended at least every 3 months for opioid prescriptions. Appointment recommended at least every 6 months for ADHD medications.    Roula BAUTISTA CMA ([facility])

## 2021-08-06 RX ORDER — METOPROLOL SUCCINATE 100 MG/1
TABLET, EXTENDED RELEASE ORAL
Qty: 90 TABLET | Refills: 0 | Status: SHIPPED | OUTPATIENT
Start: 2021-08-06 | End: 2021-01-01

## 2021-08-06 RX ORDER — POTASSIUM CHLORIDE 1500 MG/1
TABLET, EXTENDED RELEASE ORAL
Qty: 45 TABLET | Refills: 2 | Status: SHIPPED | OUTPATIENT
Start: 2021-08-06 | End: 2022-01-01

## 2021-08-06 NOTE — TELEPHONE ENCOUNTER
"Last Written Prescription Date:  6/2/21  Last Fill Quantity: 45,  # refills: 0   Last office visit provider:  6/3/21     Requested Prescriptions   Pending Prescriptions Disp Refills     potassium chloride ER (KLOR-CON M) 20 MEQ CR tablet [Pharmacy Med Name: POTASSIUM  20MEQ CONTROLLED RELEASE TABLET  SR CHLORIDE MC TB] 45 tablet 3     Sig: TAKE 1 TABLET BY MOUTH  EVERY OTHER DAY       Potassium Supplements Protocol Passed - 8/3/2021  4:02 AM        Passed - Recent (12 mo) or future (30 days) visit within the authorizing provider's department     Patient has had an office visit with the authorizing provider or a provider within the authorizing providers department within the previous 12 mos or has a future within next 30 days. See \"Patient Info\" tab in inbasket, or \"Choose Columns\" in Meds & Orders section of the refill encounter.              Passed - Medication is active on med list        Passed - Patient is age 18 or older        Passed - Normal serum potassium in past 12 months     Recent Labs   Lab Test 06/03/21  1310   POTASSIUM 3.5                         Kiran Olson RN 08/06/21 8:49 AM  "

## 2021-08-08 NOTE — TELEPHONE ENCOUNTER
" Disp Refills Start End ALEXANDER   amLODIPine (NORVASC) 10 MG tablet 90 tablet 0 6/3/2021  No   Sig - Route: Take 1 tablet (10 mg total) by mouth daily. - Oral   Sent to pharmacy as: amLODIPine 10 mg tablet (NORVASC)   E-Prescribing Status: Receipt confirmed by pharmacy (6/3/2021 12:59 PM CDT)     Routing refill request to provider for review/approval because:  Last 3 Blood Pressure's have been high. Unable to refill without provider approval.    Last Written Prescription Date:  06/03/2021  Last Fill Quantity: 90,  # refills: 0   Last office visit provider:  06/03/2021 with Antonio Cota CNP.    Requested Prescriptions   Pending Prescriptions Disp Refills     amLODIPine (NORVASC) 10 MG tablet [Pharmacy Med Name: amLODIPine Besylate 10 MG Oral Tablet] 90 tablet 3     Sig: TAKE 1 TABLET BY MOUTH  DAILY       Calcium Channel Blockers Protocol  Failed - 8/4/2021  5:05 AM        Failed - Blood pressure under 140/90 in past 12 months     BP Readings from Last 3 Encounters:   06/03/21 (!) 178/76   03/08/21 (!) 198/80   12/04/20 (!) 180/88                 Passed - Recent (12 mo) or future (30 days) visit within the authorizing provider's specialty     Patient has had an office visit with the authorizing provider or a provider within the authorizing providers department within the previous 12 mos or has a future within next 30 days. See \"Patient Info\" tab in inbasket, or \"Choose Columns\" in Meds & Orders section of the refill encounter.              Passed - Medication is active on med list        Passed - Patient is age 18 or older        Passed - Normal serum creatinine on file in past 12 months     Recent Labs   Lab Test 03/08/21  1328   CR 0.75       Ok to refill medication if creatinine is low               Inocencia Katz 08/07/21 11:51 PM  "

## 2021-08-10 ENCOUNTER — ALLIED HEALTH/NURSE VISIT (OUTPATIENT)
Dept: FAMILY MEDICINE | Facility: CLINIC | Age: 66
End: 2021-08-10
Payer: MEDICARE

## 2021-08-10 VITALS
DIASTOLIC BLOOD PRESSURE: 72 MMHG | SYSTOLIC BLOOD PRESSURE: 130 MMHG | WEIGHT: 184 LBS | OXYGEN SATURATION: 97 % | HEART RATE: 80 BPM | BODY MASS INDEX: 30.62 KG/M2

## 2021-08-10 DIAGNOSIS — Z01.30 BP CHECK: Primary | ICD-10-CM

## 2021-08-10 PROCEDURE — 99207 PR NO CHARGE NURSE ONLY: CPT

## 2021-08-10 RX ORDER — AMLODIPINE BESYLATE 10 MG/1
TABLET ORAL
Qty: 90 TABLET | Refills: 0 | Status: SHIPPED | OUTPATIENT
Start: 2021-08-10 | End: 2021-11-01

## 2021-08-10 NOTE — PROGRESS NOTES
Chief Complaint   Patient presents with     Blood Pressure Check       Christian Edwards is a 65 year old patient who comes in today for a Blood Pressure check.  Initial BP:  /72   Pulse 80   Wt 83.5 kg (184 lb)   SpO2 97%   BMI 30.62 kg/m       80  Disposition: results routed to provider    Meliza Vaughn CMA

## 2021-08-10 NOTE — TELEPHONE ENCOUNTER
Please call the patient.  We need an updated nurse only blood pressure check since starting amlodipine.    Antonio Cota NP

## 2021-09-08 DIAGNOSIS — K21.9 GERD (GASTROESOPHAGEAL REFLUX DISEASE): ICD-10-CM

## 2021-09-09 RX ORDER — OMEPRAZOLE 40 MG/1
CAPSULE, DELAYED RELEASE ORAL
Qty: 90 CAPSULE | Refills: 2 | Status: SHIPPED | OUTPATIENT
Start: 2021-09-09 | End: 2022-01-01

## 2021-09-09 NOTE — TELEPHONE ENCOUNTER
"Last Written Prescription Date:  2/25/21  Last Fill Quantity: 90,  # refills: 2   Last office visit provider:  6/3/21     Requested Prescriptions   Pending Prescriptions Disp Refills     omeprazole (PRILOSEC) 40 MG DR capsule [Pharmacy Med Name: Omeprazole 40 MG Oral Capsule Delayed Release] 90 capsule 3     Sig: TAKE 1 CAPSULE BY MOUTH  DAILY       PPI Protocol Passed - 9/8/2021  9:21 PM        Passed - Not on Clopidogrel (unless Pantoprazole ordered)        Passed - No diagnosis of osteoporosis on record        Passed - Recent (12 mo) or future (30 days) visit within the authorizing provider's specialty     Patient has had an office visit with the authorizing provider or a provider within the authorizing providers department within the previous 12 mos or has a future within next 30 days. See \"Patient Info\" tab in inbasket, or \"Choose Columns\" in Meds & Orders section of the refill encounter.              Passed - Medication is active on med list        Passed - Patient is age 18 or older             janna maldonado RN 09/09/21 3:57 PM  "

## 2021-10-31 DIAGNOSIS — I10 BENIGN ESSENTIAL HYPERTENSION: ICD-10-CM

## 2021-11-01 RX ORDER — AMLODIPINE BESYLATE 10 MG/1
10 TABLET ORAL DAILY
Qty: 90 TABLET | Refills: 1 | Status: SHIPPED | OUTPATIENT
Start: 2021-11-01 | End: 2022-01-01

## 2021-11-01 NOTE — TELEPHONE ENCOUNTER
"Last Written Prescription Date:  8/10/21  Last Fill Quantity: 90,  # refills: 0   Last office visit provider:  6/3/21     Requested Prescriptions   Pending Prescriptions Disp Refills     amLODIPine (NORVASC) 10 MG tablet [Pharmacy Med Name: amLODIPine Besylate 10 MG Oral Tablet] 90 tablet 3     Sig: TAKE 1 TABLET BY MOUTH  DAILY       Calcium Channel Blockers Protocol  Passed - 10/31/2021  4:02 AM        Passed - Blood pressure under 140/90 in past 12 months     BP Readings from Last 3 Encounters:   08/10/21 130/72   06/03/21 (!) 178/76   03/08/21 (!) 198/80                 Passed - Recent (12 mo) or future (30 days) visit within the authorizing provider's specialty     Patient has had an office visit with the authorizing provider or a provider within the authorizing providers department within the previous 12 mos or has a future within next 30 days. See \"Patient Info\" tab in inbasket, or \"Choose Columns\" in Meds & Orders section of the refill encounter.              Passed - Medication is active on med list        Passed - Patient is age 18 or older        Passed - Normal serum creatinine on file in past 12 months     Recent Labs   Lab Test 03/08/21  1328   CR 0.75       Ok to refill medication if creatinine is low               Kiran Olson RN 11/01/21 8:35 AM  "

## 2021-11-26 NOTE — TELEPHONE ENCOUNTER
"Last Written Prescription Date:  11/12/20  Last Fill Quantity: 270,  # refills: 3   Last office visit provider:  6/3/21    Requested Prescriptions   Pending Prescriptions Disp Refills     isosorbide dinitrate (ISORDIL) 10 MG tablet [Pharmacy Med Name: ISOSORBIDE DINITRATE  10MG  TAB] 270 tablet 3     Sig: TAKE 1 TABLET BY MOUTH 3  TIMES DAILY AT 8:00 AM,  12:00 NOON, AND 6:00 PM       Nitrates Passed - 11/25/2021  3:44 AM        Passed - Blood pressure under 140/90 in past 12 months     BP Readings from Last 3 Encounters:   08/10/21 130/72   06/03/21 (!) 178/76   03/08/21 (!) 198/80                 Passed - Pt is not on erectile dysfunction medications        Passed - Recent (12 mo) or future (30 days) visit within the authorizing provider's specialty     Patient has had an office visit with the authorizing provider or a provider within the authorizing providers department within the previous 12 mos or has a future within next 30 days. See \"Patient Info\" tab in inbasket, or \"Choose Columns\" in Meds & Orders section of the refill encounter.              Passed - Medication is active on med list        Passed - Patient is age 18 or older             Davina Bernardo RN 11/26/21 8:22 AM  "

## 2021-12-01 NOTE — PROGRESS NOTES
Chief Complaint   Patient presents with     Medication Management     Blood Pressure Check     Back Pain     Low right side pain that started 4-5 weeks and not getting better. Now to the point when he takes a deep breath it hurts.      Diarrhea     Going on for month. No blood.        HPI: Patient presents today for medication check.  At his last visit blood pressure was significantly elevated so we increased his lisinopril to 40 mg.  He was supposed to follow-up with his cardiologist which he was overdue for but that never happened.  He was also supposed to follow-up with me shortly after but that also did not happen.  Since that time he has had a follow-up with his oncologist and blood pressure was significantly elevated then.  No lower extremity swelling.  He has over the past month started to develop a pain with deep inspiration along his right lateral chest wall that will wrap around towards the front of the chest.  Nonexertional.  He does have known pulmonary fibrosis and feels like it its taking more effort to get up a flight of stairs.  No diaphoresis.  No nausea.  Pain does not radiate into the shoulders/arms or jaw.  No lightheadedness or wooziness.  No left-sided chest pain.  Known history of DVT.  Patient denies hemoptysis.  No recent immobilization.  Unfortunately still drinking alcohol.  Has not followed up with pulmonology as previously recommended.    When I last saw the patient, he had mildly low potassium levels.  At his follow-up with hematology/oncology they were critically low.  Patient was started on potassium supplementation daily.  Over the past 3 months he has only been taking it every other day.  No muscle cramps.    ROS:Review of Systems - negative except for what's listed in the HPI    SH: The Patient's  reports that he quit smoking about 19 years ago. He smoked 0.00 packs per day. He has never used smokeless tobacco. He reports current alcohol use of about 14.0 standard drinks of alcohol  "per week. He reports current drug use. Drug: Marijuana.      FH: The Patient's family history includes COPD in his mother; Heart disease in his father; No Medical Problems in his sister; Sleep apnea in his brother.     Meds:    Current Outpatient Medications on File Prior to Visit   Medication Sig Dispense Refill     aspirin 81 MG EC tablet Take 1 tablet (81 mg total) by mouth daily.  0     atorvastatin (LIPITOR) 40 MG tablet Take 1 tablet (40 mg total) by mouth at bedtime. 90 tablet 3     isosorbide dinitrate (ISORDIL) 10 MG tablet TAKE 1 TABLET BY MOUTH 3  TIMES DAILY AT 8:00 AM,  12:00 NOON, AND 6:00 PM. 270 tablet 3     lisinopriL (PRINIVIL,ZESTRIL) 40 MG tablet TAKE 1 TABLET BY MOUTH  DAILY 90 tablet 3     metoprolol succinate (TOPROL-XL) 100 MG 24 hr tablet TAKE 1 TABLET BY MOUTH  DAILY 90 tablet 2     multivitamin (MULTIVITAMIN) per tablet Take 1 tablet by mouth daily.       omeprazole (PRILOSEC) 40 MG capsule Take 1 capsule (40 mg total) by mouth daily. 90 capsule 2     potassium chloride (K-DUR,KLOR-CON) 20 MEQ tablet Take 1 tablet (20 mEq total) by mouth every other day. 45 tablet 0     nitroglycerin (NITROSTAT) 0.4 MG SL tablet Place 0.4 mg under the tongue every 5 (five) minutes as needed.              [DISCONTINUED] codeine 30 MG tablet Take 1 tablet (30 mg total) by mouth 2 (two) times a day as needed for pain. 70 tablet 0     No current facility-administered medications on file prior to visit.        O:  /76   Pulse 94   Temp 98.6  F (37  C) (Oral)   Ht 5' 5\" (1.651 m)   Wt 187 lb (84.8 kg)   SpO2 96%   BMI 31.12 kg/m      Physical Examination:   General appearance - alert, well appearing, and in no distress  Mental status - alert, oriented to person, place, and time  Eyes -PERRLA  Ears - bilateral TM's and external ear canals normal  Neck - no significant adenopathy, no carotid bruit  Lymphatics - no palpable lymphadenopathy  Chest - clear to auscultation, no wheezes, rales or rhonchi, " symmetric air entry  Heart - normal rate and regular rhythm, S1 and S2 normal, no murmurs noted  Abdomen - soft, nontender, nondistended, no masses or organomegaly  Neurological - alert, oriented, normal speech, no focal findings or movement disorder noted, neck supple without rigidity, cranial nerves II through XII intact, motor and sensory grossly normal bilaterally, normal muscle tone, no tremors, strength 5/5  Extremities - peripheral pulses normal, no peripheral edema  Skin - normal coloration and turgor.      A/P:     Problem List Items Addressed This Visit        Edg Concept Cardiac Problems    Pulmonary fibrosis (H) - Primary    Cardiomyopathy, unspecified type (H)    Relevant Medications    amLODIPine (NORVASC) 10 MG tablet       Other    History of non-ST elevation myocardial infarction (NSTEMI)    Personal history of DVT (deep vein thrombosis)    Alcohol dependence, uncomplicated (H)      Other Visit Diagnoses     Chest pain on breathing        Relevant Orders    D-dimer, Quantitative (Completed)    XR Chest 2 Views (Completed)    Hypokalemia        Relevant Orders    Potassium (Completed)    Benign essential hypertension        Relevant Medications    amLODIPine (NORVASC) 10 MG tablet    Elevated d-dimer        Relevant Orders    CTA Chest PE Run    Acute chest pain        Relevant Orders    CTA Chest PE Run    Shortness of breath        Relevant Orders    CTA Chest PE Run        Recommended patient follow-up with his cardiologist.  Encouraged no alcohol use.  Given history of DVT and cancer, D-dimer to assess for pulmonary emboli.  Recheck potassium levels since taking potassium every other day instead of daily.  Add on amlodipine.  I encouraged the patient to start with a half a tablet daily for 3 weeks and then come back for nurse only blood pressure check.  If still elevated, take a full tablet.  If pain in the chest becomes persistent/constant or evolves with other associated symptoms (nausea,  fatigue, diaphoresis, etc.) get checked out in the emergency department.  Shortness of breath is chronic and stable albeit may be a tiny bit worse per the patient's description.  Likely slow progression of pulmonary fibrosis.  Patient is not interested in meeting with pulmonary medicine again right now.    1. Pulmonary fibrosis (H)    2. Cardiomyopathy, unspecified type (H)    3. Alcohol dependence, uncomplicated (H)    4. History of non-ST elevation myocardial infarction (NSTEMI)    5. Chest pain on breathing  - D-dimer, Quantitative  - XR Chest 2 Views    6. Hypokalemia  - Potassium    7. Benign essential hypertension  - amLODIPine (NORVASC) 10 MG tablet; Take 1 tablet (10 mg total) by mouth daily.  Dispense: 90 tablet; Refill: 0    8. Elevated d-dimer  - CTA Chest PE Run; Future    9. Acute chest pain  - CTA Chest PE Run; Future    10. Shortness of breath  - CTA Chest PE Run; Future    11. Personal history of DVT (deep vein thrombosis)    Total time spent was at least 40 minutes including reviewing records prior to arrival, consultation, placing orders, education, and reviewing the plan of care on the date of service.      Antonio Cota, CNP      This note has been dictated using voice recognition software. Any grammatical or context distortions are unintentional and inherent to the software.     PAST SURGICAL HISTORY:  No significant past surgical history

## 2022-01-01 ENCOUNTER — PATIENT OUTREACH (OUTPATIENT)
Dept: FAMILY MEDICINE | Facility: CLINIC | Age: 67
End: 2022-01-01

## 2022-01-01 ENCOUNTER — TELEPHONE (OUTPATIENT)
Dept: ONCOLOGY | Facility: HOSPITAL | Age: 67
End: 2022-01-01
Payer: MEDICARE

## 2022-01-01 ENCOUNTER — OFFICE VISIT (OUTPATIENT)
Dept: FAMILY MEDICINE | Facility: CLINIC | Age: 67
End: 2022-01-01
Payer: MEDICARE

## 2022-01-01 ENCOUNTER — HOSPITAL ENCOUNTER (OUTPATIENT)
Dept: CT IMAGING | Facility: CLINIC | Age: 67
Discharge: HOME | End: 2022-03-28
Attending: NURSE PRACTITIONER | Admitting: NURSE PRACTITIONER
Payer: MEDICARE

## 2022-01-01 ENCOUNTER — APPOINTMENT (OUTPATIENT)
Dept: RADIOLOGY | Facility: CLINIC | Age: 67
DRG: 312 | End: 2022-01-01
Attending: FAMILY MEDICINE
Payer: MEDICARE

## 2022-01-01 ENCOUNTER — APPOINTMENT (OUTPATIENT)
Dept: CT IMAGING | Facility: CLINIC | Age: 67
DRG: 312 | End: 2022-01-01
Attending: EMERGENCY MEDICINE
Payer: MEDICARE

## 2022-01-01 ENCOUNTER — APPOINTMENT (OUTPATIENT)
Dept: CT IMAGING | Facility: CLINIC | Age: 67
DRG: 312 | End: 2022-01-01
Attending: FAMILY MEDICINE
Payer: MEDICARE

## 2022-01-01 ENCOUNTER — HOSPITAL ENCOUNTER (INPATIENT)
Facility: CLINIC | Age: 67
LOS: 1 days | Discharge: HOME | DRG: 312 | End: 2022-08-06
Attending: FAMILY MEDICINE | Admitting: HOSPITALIST
Payer: MEDICARE

## 2022-01-01 ENCOUNTER — APPOINTMENT (OUTPATIENT)
Dept: CARDIOLOGY | Facility: CLINIC | Age: 67
DRG: 312 | End: 2022-01-01
Attending: HOSPITALIST
Payer: MEDICARE

## 2022-01-01 ENCOUNTER — TELEPHONE (OUTPATIENT)
Dept: FAMILY MEDICINE | Facility: CLINIC | Age: 67
End: 2022-01-01

## 2022-01-01 VITALS
BODY MASS INDEX: 27.52 KG/M2 | TEMPERATURE: 98 F | HEART RATE: 68 BPM | DIASTOLIC BLOOD PRESSURE: 60 MMHG | WEIGHT: 170.5 LBS | OXYGEN SATURATION: 99 % | SYSTOLIC BLOOD PRESSURE: 116 MMHG

## 2022-01-01 VITALS
DIASTOLIC BLOOD PRESSURE: 70 MMHG | BODY MASS INDEX: 29.95 KG/M2 | SYSTOLIC BLOOD PRESSURE: 100 MMHG | OXYGEN SATURATION: 95 % | WEIGHT: 180 LBS | HEART RATE: 83 BPM

## 2022-01-01 VITALS
OXYGEN SATURATION: 97 % | HEART RATE: 77 BPM | SYSTOLIC BLOOD PRESSURE: 103 MMHG | BODY MASS INDEX: 26.66 KG/M2 | WEIGHT: 165.9 LBS | DIASTOLIC BLOOD PRESSURE: 64 MMHG | RESPIRATION RATE: 16 BRPM | HEIGHT: 66 IN | TEMPERATURE: 98.1 F

## 2022-01-01 DIAGNOSIS — E87.6 HYPOKALEMIA: ICD-10-CM

## 2022-01-01 DIAGNOSIS — F10.20 ALCOHOL DEPENDENCE, UNCOMPLICATED (H): ICD-10-CM

## 2022-01-01 DIAGNOSIS — E86.1 HYPOTENSION DUE TO HYPOVOLEMIA: ICD-10-CM

## 2022-01-01 DIAGNOSIS — K70.10 ACUTE ALCOHOLIC HEPATITIS (H): ICD-10-CM

## 2022-01-01 DIAGNOSIS — K21.9 GASTROESOPHAGEAL REFLUX DISEASE WITHOUT ESOPHAGITIS: ICD-10-CM

## 2022-01-01 DIAGNOSIS — I25.84 CORONARY ARTERY DISEASE DUE TO CALCIFIED CORONARY LESION: ICD-10-CM

## 2022-01-01 DIAGNOSIS — I10 ESSENTIAL HYPERTENSION: ICD-10-CM

## 2022-01-01 DIAGNOSIS — E87.5 HYPERKALEMIA: ICD-10-CM

## 2022-01-01 DIAGNOSIS — I25.2 HISTORY OF NON-ST ELEVATION MYOCARDIAL INFARCTION (NSTEMI): ICD-10-CM

## 2022-01-01 DIAGNOSIS — R79.89 ELEVATED LFTS: ICD-10-CM

## 2022-01-01 DIAGNOSIS — I10 BENIGN ESSENTIAL HYPERTENSION: ICD-10-CM

## 2022-01-01 DIAGNOSIS — E78.5 HYPERLIPIDEMIA, UNSPECIFIED HYPERLIPIDEMIA TYPE: ICD-10-CM

## 2022-01-01 DIAGNOSIS — N17.9 ACUTE KIDNEY INJURY (H): ICD-10-CM

## 2022-01-01 DIAGNOSIS — I10 BENIGN ESSENTIAL HYPERTENSION: Primary | ICD-10-CM

## 2022-01-01 DIAGNOSIS — Z86.73 HISTORY OF CVA (CEREBROVASCULAR ACCIDENT): Primary | ICD-10-CM

## 2022-01-01 DIAGNOSIS — N17.9 ACUTE KIDNEY INJURY (H): Primary | ICD-10-CM

## 2022-01-01 DIAGNOSIS — Z86.711 HISTORY OF PULMONARY EMBOLISM: ICD-10-CM

## 2022-01-01 DIAGNOSIS — R93.5 ABNORMAL CT OF THE ABDOMEN: ICD-10-CM

## 2022-01-01 DIAGNOSIS — Z12.5 SCREENING FOR PROSTATE CANCER: ICD-10-CM

## 2022-01-01 DIAGNOSIS — E83.42 HYPOMAGNESEMIA: ICD-10-CM

## 2022-01-01 DIAGNOSIS — J84.10 PULMONARY FIBROSIS (H): ICD-10-CM

## 2022-01-01 DIAGNOSIS — I42.9 CARDIOMYOPATHY, UNSPECIFIED TYPE (H): ICD-10-CM

## 2022-01-01 DIAGNOSIS — R93.5 ABNORMAL CT OF THE ABDOMEN: Primary | ICD-10-CM

## 2022-01-01 DIAGNOSIS — R55 SYNCOPE, UNSPECIFIED SYNCOPE TYPE: ICD-10-CM

## 2022-01-01 DIAGNOSIS — H61.22 IMPACTED CERUMEN OF LEFT EAR: ICD-10-CM

## 2022-01-01 DIAGNOSIS — I25.10 CORONARY ARTERY DISEASE DUE TO CALCIFIED CORONARY LESION: ICD-10-CM

## 2022-01-01 DIAGNOSIS — G70.9 NEUROMUSCULAR WEAKNESS (H): ICD-10-CM

## 2022-01-01 DIAGNOSIS — C18.2 PRIMARY ADENOCARCINOMA OF ASCENDING COLON (H): ICD-10-CM

## 2022-01-01 LAB
ALBUMIN SERPL BCG-MCNC: 3.7 G/DL (ref 3.5–5.2)
ALBUMIN SERPL-MCNC: 3.2 G/DL (ref 3.5–5)
ALBUMIN UR-MCNC: NEGATIVE MG/DL
ALP SERPL-CCNC: 393 U/L (ref 40–129)
ALP SERPL-CCNC: 475 U/L (ref 45–120)
ALT SERPL W P-5'-P-CCNC: 50 U/L (ref 10–50)
ALT SERPL W P-5'-P-CCNC: 78 U/L (ref 0–45)
ANION GAP SERPL CALCULATED.3IONS-SCNC: 14 MMOL/L (ref 5–18)
ANION GAP SERPL CALCULATED.3IONS-SCNC: 14 MMOL/L (ref 7–15)
ANION GAP SERPL CALCULATED.3IONS-SCNC: 17 MMOL/L (ref 5–18)
ANION GAP SERPL CALCULATED.3IONS-SCNC: 8 MMOL/L (ref 5–18)
APPEARANCE UR: CLEAR
AST SERPL W P-5'-P-CCNC: 187 U/L (ref 0–40)
AST SERPL W P-5'-P-CCNC: 64 U/L (ref 10–50)
ATRIAL RATE - MUSE: 57 BPM
BASOPHILS # BLD AUTO: 0.1 10E3/UL (ref 0–0.2)
BASOPHILS NFR BLD AUTO: 1 %
BILIRUB SERPL-MCNC: 0.6 MG/DL
BILIRUB SERPL-MCNC: 1.7 MG/DL (ref 0–1)
BILIRUB UR QL STRIP: NEGATIVE
BNP SERPL-MCNC: 121 PG/ML (ref 0–59)
BUN SERPL-MCNC: 12 MG/DL (ref 8–22)
BUN SERPL-MCNC: 14.5 MG/DL (ref 8–23)
BUN SERPL-MCNC: 23 MG/DL (ref 8–22)
BUN SERPL-MCNC: 8 MG/DL (ref 8–22)
CALCIUM SERPL-MCNC: 8.8 MG/DL (ref 8.5–10.5)
CALCIUM SERPL-MCNC: 9.4 MG/DL (ref 8.8–10.2)
CALCIUM SERPL-MCNC: 9.6 MG/DL (ref 8.5–10.5)
CALCIUM SERPL-MCNC: 9.8 MG/DL (ref 8.5–10.5)
CHLORIDE BLD-SCNC: 100 MMOL/L (ref 98–107)
CHLORIDE BLD-SCNC: 101 MMOL/L (ref 98–107)
CHLORIDE BLD-SCNC: 110 MMOL/L (ref 98–107)
CHLORIDE SERPL-SCNC: 103 MMOL/L (ref 98–107)
CHOLEST SERPL-MCNC: 160 MG/DL
CO2 SERPL-SCNC: 15 MMOL/L (ref 22–31)
CO2 SERPL-SCNC: 19 MMOL/L (ref 22–31)
CO2 SERPL-SCNC: 27 MMOL/L (ref 22–31)
COLOR UR AUTO: ABNORMAL
CREAT SERPL-MCNC: 0.82 MG/DL (ref 0.7–1.3)
CREAT SERPL-MCNC: 0.83 MG/DL (ref 0.67–1.17)
CREAT SERPL-MCNC: 0.92 MG/DL (ref 0.7–1.3)
CREAT SERPL-MCNC: 1.91 MG/DL (ref 0.7–1.3)
DEPRECATED HCO3 PLAS-SCNC: 23 MMOL/L (ref 22–29)
DIASTOLIC BLOOD PRESSURE - MUSE: NORMAL MMHG
EOSINOPHIL # BLD AUTO: 0.3 10E3/UL (ref 0–0.7)
EOSINOPHIL NFR BLD AUTO: 3 %
ERYTHROCYTE [DISTWIDTH] IN BLOOD BY AUTOMATED COUNT: 12.3 % (ref 10–15)
ERYTHROCYTE [DISTWIDTH] IN BLOOD BY AUTOMATED COUNT: 12.6 % (ref 10–15)
FASTING STATUS PATIENT QL REPORTED: NORMAL
FOLATE SERPL-MCNC: 24.8 NG/ML (ref 4.6–34.8)
GFR SERPL CREATININE-BSD FRML MDRD: 38 ML/MIN/1.73M2
GFR SERPL CREATININE-BSD FRML MDRD: >90 ML/MIN/1.73M2
GLUCOSE BLD-MCNC: 104 MG/DL (ref 70–125)
GLUCOSE BLD-MCNC: 136 MG/DL (ref 70–125)
GLUCOSE BLD-MCNC: 98 MG/DL (ref 70–125)
GLUCOSE SERPL-MCNC: 105 MG/DL (ref 70–99)
GLUCOSE UR STRIP-MCNC: NEGATIVE MG/DL
HCT VFR BLD AUTO: 30.6 % (ref 40–53)
HCT VFR BLD AUTO: 33.6 % (ref 40–53)
HDLC SERPL-MCNC: 59 MG/DL
HGB BLD-MCNC: 10.2 G/DL (ref 13.3–17.7)
HGB BLD-MCNC: 11.3 G/DL (ref 13.3–17.7)
HGB UR QL STRIP: NEGATIVE
HOLD SPECIMEN: NORMAL
HYALINE CASTS: 15 /LPF
IMM GRANULOCYTES # BLD: 0.1 10E3/UL
IMM GRANULOCYTES NFR BLD: 1 %
INTERPRETATION ECG - MUSE: NORMAL
KETONES UR STRIP-MCNC: NEGATIVE MG/DL
LACTATE SERPL-SCNC: 2.3 MMOL/L (ref 0.7–2)
LACTATE SERPL-SCNC: 2.7 MMOL/L (ref 0.7–2)
LDLC SERPL CALC-MCNC: 77 MG/DL
LEUKOCYTE ESTERASE UR QL STRIP: NEGATIVE
LYMPHOCYTES # BLD AUTO: 2.1 10E3/UL (ref 0.8–5.3)
LYMPHOCYTES NFR BLD AUTO: 19 %
MAGNESIUM SERPL-MCNC: 1.4 MG/DL (ref 1.8–2.6)
MAGNESIUM SERPL-MCNC: 1.5 MG/DL (ref 1.7–2.3)
MAGNESIUM SERPL-MCNC: 2.1 MG/DL (ref 1.8–2.6)
MAGNESIUM SERPL-MCNC: 2.8 MG/DL (ref 1.8–2.6)
MCH RBC QN AUTO: 34.1 PG (ref 26.5–33)
MCH RBC QN AUTO: 35.1 PG (ref 26.5–33)
MCHC RBC AUTO-ENTMCNC: 33.3 G/DL (ref 31.5–36.5)
MCHC RBC AUTO-ENTMCNC: 33.6 G/DL (ref 31.5–36.5)
MCV RBC AUTO: 102 FL (ref 78–100)
MCV RBC AUTO: 105 FL (ref 78–100)
MONOCYTES # BLD AUTO: 0.9 10E3/UL (ref 0–1.3)
MONOCYTES NFR BLD AUTO: 9 %
MUCOUS THREADS #/AREA URNS LPF: PRESENT /LPF
NEUTROPHILS # BLD AUTO: 7.2 10E3/UL (ref 1.6–8.3)
NEUTROPHILS NFR BLD AUTO: 67 %
NITRATE UR QL: NEGATIVE
NRBC # BLD AUTO: 0 10E3/UL
NRBC BLD AUTO-RTO: 0 /100
P AXIS - MUSE: 44 DEGREES
PH UR STRIP: 5 [PH] (ref 5–7)
PHOSPHATE SERPL-MCNC: 4.3 MG/DL (ref 2.5–4.5)
PLATELET # BLD AUTO: 167 10E3/UL (ref 150–450)
PLATELET # BLD AUTO: 231 10E3/UL (ref 150–450)
POTASSIUM BLD-SCNC: 4.6 MMOL/L (ref 3.5–5)
POTASSIUM BLD-SCNC: 4.7 MMOL/L (ref 3.5–5)
POTASSIUM BLD-SCNC: 5.6 MMOL/L (ref 3.5–5)
POTASSIUM SERPL-SCNC: 4.6 MMOL/L (ref 3.4–5.3)
PR INTERVAL - MUSE: 186 MS
PROCALCITONIN SERPL-MCNC: 0.15 NG/ML (ref 0–0.49)
PROT SERPL-MCNC: 6.8 G/DL (ref 6.4–8.3)
PROT SERPL-MCNC: 7.2 G/DL (ref 6–8)
PSA SERPL-MCNC: 1.62 UG/L (ref 0–4.5)
QRS DURATION - MUSE: 92 MS
QT - MUSE: 428 MS
QTC - MUSE: 416 MS
R AXIS - MUSE: 76 DEGREES
RBC # BLD AUTO: 2.91 10E6/UL (ref 4.4–5.9)
RBC # BLD AUTO: 3.31 10E6/UL (ref 4.4–5.9)
RBC URINE: <1 /HPF
SARS-COV-2 RNA RESP QL NAA+PROBE: NEGATIVE
SODIUM SERPL-SCNC: 133 MMOL/L (ref 136–145)
SODIUM SERPL-SCNC: 137 MMOL/L (ref 136–145)
SODIUM SERPL-SCNC: 140 MMOL/L (ref 136–145)
SODIUM SERPL-SCNC: 141 MMOL/L (ref 136–145)
SP GR UR STRIP: 1.01 (ref 1–1.03)
SQUAMOUS EPITHELIAL: <1 /HPF
SYSTOLIC BLOOD PRESSURE - MUSE: NORMAL MMHG
T AXIS - MUSE: 87 DEGREES
TRIGL SERPL-MCNC: 120 MG/DL
TROPONIN I SERPL-MCNC: <0.01 NG/ML (ref 0–0.29)
UROBILINOGEN UR STRIP-MCNC: <2 MG/DL
VENTRICULAR RATE- MUSE: 57 BPM
VIT B12 SERPL-MCNC: 558 PG/ML (ref 232–1245)
WBC # BLD AUTO: 10.7 10E3/UL (ref 4–11)
WBC # BLD AUTO: 5.6 10E3/UL (ref 4–11)
WBC URINE: 2 /HPF

## 2022-01-01 PROCEDURE — 36415 COLL VENOUS BLD VENIPUNCTURE: CPT | Performed by: EMERGENCY MEDICINE

## 2022-01-01 PROCEDURE — 250N000011 HC RX IP 250 OP 636: Performed by: EMERGENCY MEDICINE

## 2022-01-01 PROCEDURE — 83605 ASSAY OF LACTIC ACID: CPT | Performed by: FAMILY MEDICINE

## 2022-01-01 PROCEDURE — 83735 ASSAY OF MAGNESIUM: CPT | Performed by: EMERGENCY MEDICINE

## 2022-01-01 PROCEDURE — 99238 HOSP IP/OBS DSCHRG MGMT 30/<: CPT | Performed by: EMERGENCY MEDICINE

## 2022-01-01 PROCEDURE — 85025 COMPLETE CBC W/AUTO DIFF WBC: CPT | Performed by: FAMILY MEDICINE

## 2022-01-01 PROCEDURE — 93005 ELECTROCARDIOGRAM TRACING: CPT | Performed by: EMERGENCY MEDICINE

## 2022-01-01 PROCEDURE — 999N000127 HC STATISTIC PERIPHERAL IV START W US GUIDANCE

## 2022-01-01 PROCEDURE — 250N000011 HC RX IP 250 OP 636: Performed by: NURSE PRACTITIONER

## 2022-01-01 PROCEDURE — G1010 CDSM STANSON: HCPCS

## 2022-01-01 PROCEDURE — 81001 URINALYSIS AUTO W/SCOPE: CPT | Performed by: FAMILY MEDICINE

## 2022-01-01 PROCEDURE — 250N000011 HC RX IP 250 OP 636: Performed by: FAMILY MEDICINE

## 2022-01-01 PROCEDURE — 258N000003 HC RX IP 258 OP 636: Performed by: HOSPITALIST

## 2022-01-01 PROCEDURE — 84484 ASSAY OF TROPONIN QUANT: CPT | Performed by: FAMILY MEDICINE

## 2022-01-01 PROCEDURE — 82746 ASSAY OF FOLIC ACID SERUM: CPT | Performed by: EMERGENCY MEDICINE

## 2022-01-01 PROCEDURE — 96372 THER/PROPH/DIAG INJ SC/IM: CPT | Performed by: EMERGENCY MEDICINE

## 2022-01-01 PROCEDURE — 250N000013 HC RX MED GY IP 250 OP 250 PS 637: Performed by: HOSPITALIST

## 2022-01-01 PROCEDURE — 82607 VITAMIN B-12: CPT | Performed by: EMERGENCY MEDICINE

## 2022-01-01 PROCEDURE — 80053 COMPREHEN METABOLIC PANEL: CPT | Performed by: NURSE PRACTITIONER

## 2022-01-01 PROCEDURE — U0003 INFECTIOUS AGENT DETECTION BY NUCLEIC ACID (DNA OR RNA); SEVERE ACUTE RESPIRATORY SYNDROME CORONAVIRUS 2 (SARS-COV-2) (CORONAVIRUS DISEASE [COVID-19]), AMPLIFIED PROBE TECHNIQUE, MAKING USE OF HIGH THROUGHPUT TECHNOLOGIES AS DESCRIBED BY CMS-2020-01-R: HCPCS | Performed by: FAMILY MEDICINE

## 2022-01-01 PROCEDURE — 258N000003 HC RX IP 258 OP 636: Performed by: FAMILY MEDICINE

## 2022-01-01 PROCEDURE — 83735 ASSAY OF MAGNESIUM: CPT | Performed by: NURSE PRACTITIONER

## 2022-01-01 PROCEDURE — 36415 COLL VENOUS BLD VENIPUNCTURE: CPT | Performed by: NURSE PRACTITIONER

## 2022-01-01 PROCEDURE — 99214 OFFICE O/P EST MOD 30 MIN: CPT | Mod: 25 | Performed by: NURSE PRACTITIONER

## 2022-01-01 PROCEDURE — 74177 CT ABD & PELVIS W/CONTRAST: CPT | Mod: MG

## 2022-01-01 PROCEDURE — 83605 ASSAY OF LACTIC ACID: CPT | Performed by: HOSPITALIST

## 2022-01-01 PROCEDURE — 69210 REMOVE IMPACTED EAR WAX UNI: CPT | Mod: LT | Performed by: NURSE PRACTITIONER

## 2022-01-01 PROCEDURE — 85027 COMPLETE CBC AUTOMATED: CPT | Performed by: HOSPITALIST

## 2022-01-01 PROCEDURE — 99223 1ST HOSP IP/OBS HIGH 75: CPT | Mod: AI | Performed by: HOSPITALIST

## 2022-01-01 PROCEDURE — 71275 CT ANGIOGRAPHY CHEST: CPT | Mod: MA

## 2022-01-01 PROCEDURE — 99207 PR NO BILLABLE SERVICE THIS VISIT: CPT | Performed by: EMERGENCY MEDICINE

## 2022-01-01 PROCEDURE — G0103 PSA SCREENING: HCPCS | Performed by: NURSE PRACTITIONER

## 2022-01-01 PROCEDURE — 250N000013 HC RX MED GY IP 250 OP 250 PS 637: Performed by: EMERGENCY MEDICINE

## 2022-01-01 PROCEDURE — 84484 ASSAY OF TROPONIN QUANT: CPT | Performed by: HOSPITALIST

## 2022-01-01 PROCEDURE — 84145 PROCALCITONIN (PCT): CPT | Performed by: FAMILY MEDICINE

## 2022-01-01 PROCEDURE — 999N000208 ECHOCARDIOGRAM COMPLETE

## 2022-01-01 PROCEDURE — 93306 TTE W/DOPPLER COMPLETE: CPT | Mod: 26 | Performed by: INTERNAL MEDICINE

## 2022-01-01 PROCEDURE — 71046 X-RAY EXAM CHEST 2 VIEWS: CPT

## 2022-01-01 PROCEDURE — 96365 THER/PROPH/DIAG IV INF INIT: CPT

## 2022-01-01 PROCEDURE — G0378 HOSPITAL OBSERVATION PER HR: HCPCS

## 2022-01-01 PROCEDURE — 93005 ELECTROCARDIOGRAM TRACING: CPT | Performed by: FAMILY MEDICINE

## 2022-01-01 PROCEDURE — 84100 ASSAY OF PHOSPHORUS: CPT | Performed by: HOSPITALIST

## 2022-01-01 PROCEDURE — 83735 ASSAY OF MAGNESIUM: CPT | Performed by: FAMILY MEDICINE

## 2022-01-01 PROCEDURE — 36415 COLL VENOUS BLD VENIPUNCTURE: CPT | Performed by: FAMILY MEDICINE

## 2022-01-01 PROCEDURE — C9803 HOPD COVID-19 SPEC COLLECT: HCPCS

## 2022-01-01 PROCEDURE — 83880 ASSAY OF NATRIURETIC PEPTIDE: CPT | Performed by: FAMILY MEDICINE

## 2022-01-01 PROCEDURE — 36415 COLL VENOUS BLD VENIPUNCTURE: CPT | Performed by: HOSPITALIST

## 2022-01-01 PROCEDURE — 83735 ASSAY OF MAGNESIUM: CPT | Performed by: HOSPITALIST

## 2022-01-01 PROCEDURE — 80048 BASIC METABOLIC PNL TOTAL CA: CPT | Performed by: HOSPITALIST

## 2022-01-01 PROCEDURE — 255N000002 HC RX 255 OP 636: Performed by: EMERGENCY MEDICINE

## 2022-01-01 PROCEDURE — 99285 EMERGENCY DEPT VISIT HI MDM: CPT | Mod: 25

## 2022-01-01 PROCEDURE — 80048 BASIC METABOLIC PNL TOTAL CA: CPT | Performed by: FAMILY MEDICINE

## 2022-01-01 PROCEDURE — 120N000001 HC R&B MED SURG/OB

## 2022-01-01 PROCEDURE — 96361 HYDRATE IV INFUSION ADD-ON: CPT

## 2022-01-01 PROCEDURE — 80061 LIPID PANEL: CPT | Performed by: NURSE PRACTITIONER

## 2022-01-01 PROCEDURE — 99214 OFFICE O/P EST MOD 30 MIN: CPT | Performed by: NURSE PRACTITIONER

## 2022-01-01 RX ORDER — HALOPERIDOL 5 MG/ML
2.5-5 INJECTION INTRAMUSCULAR EVERY 6 HOURS PRN
Status: DISCONTINUED | OUTPATIENT
Start: 2022-01-01 | End: 2022-01-01 | Stop reason: HOSPADM

## 2022-01-01 RX ORDER — ATORVASTATIN CALCIUM 40 MG/1
TABLET, FILM COATED ORAL
Qty: 90 TABLET | Refills: 3 | OUTPATIENT
Start: 2022-01-01

## 2022-01-01 RX ORDER — PANTOPRAZOLE SODIUM 20 MG/1
40 TABLET, DELAYED RELEASE ORAL
Status: DISCONTINUED | OUTPATIENT
Start: 2022-01-01 | End: 2022-01-01 | Stop reason: HOSPADM

## 2022-01-01 RX ORDER — LISINOPRIL 20 MG/1
TABLET ORAL
Qty: 30 TABLET | Refills: 0 | Status: SHIPPED | OUTPATIENT
Start: 2022-01-01 | End: 2022-01-01

## 2022-01-01 RX ORDER — FOLIC ACID 1 MG/1
1 TABLET ORAL DAILY
Qty: 90 TABLET | Refills: 3 | Status: SHIPPED | OUTPATIENT
Start: 2022-01-01

## 2022-01-01 RX ORDER — METOPROLOL SUCCINATE 100 MG/1
TABLET, EXTENDED RELEASE ORAL
Qty: 90 TABLET | Refills: 1 | Status: SHIPPED | OUTPATIENT
Start: 2022-01-01 | End: 2022-01-01

## 2022-01-01 RX ORDER — LISINOPRIL 40 MG/1
TABLET ORAL
Qty: 90 TABLET | Refills: 3 | OUTPATIENT
Start: 2022-01-01

## 2022-01-01 RX ORDER — ACETAMINOPHEN 650 MG/1
650 SUPPOSITORY RECTAL EVERY 6 HOURS PRN
Status: DISCONTINUED | OUTPATIENT
Start: 2022-01-01 | End: 2022-01-01 | Stop reason: HOSPADM

## 2022-01-01 RX ORDER — FOLIC ACID 1 MG/1
1 TABLET ORAL DAILY
Qty: 30 TABLET | Refills: 0 | Status: SHIPPED | OUTPATIENT
Start: 2022-01-01 | End: 2022-01-01

## 2022-01-01 RX ORDER — ONDANSETRON 4 MG/1
4 TABLET, ORALLY DISINTEGRATING ORAL EVERY 6 HOURS PRN
Status: DISCONTINUED | OUTPATIENT
Start: 2022-01-01 | End: 2022-01-01 | Stop reason: HOSPADM

## 2022-01-01 RX ORDER — MAGNESIUM SULFATE 4 G/50ML
4 INJECTION INTRAVENOUS ONCE
Status: COMPLETED | OUTPATIENT
Start: 2022-01-01 | End: 2022-01-01

## 2022-01-01 RX ORDER — ASPIRIN 81 MG/1
81 TABLET ORAL DAILY
Status: DISCONTINUED | OUTPATIENT
Start: 2022-01-01 | End: 2022-01-01 | Stop reason: HOSPADM

## 2022-01-01 RX ORDER — METOPROLOL SUCCINATE 50 MG/1
TABLET, EXTENDED RELEASE ORAL
Qty: 90 TABLET | Refills: 0 | Status: SHIPPED | OUTPATIENT
Start: 2022-01-01 | End: 2022-01-01

## 2022-01-01 RX ORDER — ACETAMINOPHEN 325 MG/1
650 TABLET ORAL EVERY 6 HOURS PRN
Status: DISCONTINUED | OUTPATIENT
Start: 2022-01-01 | End: 2022-01-01 | Stop reason: HOSPADM

## 2022-01-01 RX ORDER — LISINOPRIL 40 MG/1
TABLET ORAL
Qty: 90 TABLET | Refills: 1 | Status: ON HOLD | OUTPATIENT
Start: 2022-01-01 | End: 2022-01-01

## 2022-01-01 RX ORDER — AMLODIPINE BESYLATE 10 MG/1
TABLET ORAL
Qty: 90 TABLET | Refills: 3 | OUTPATIENT
Start: 2022-01-01

## 2022-01-01 RX ORDER — FLUMAZENIL 0.1 MG/ML
0.2 INJECTION, SOLUTION INTRAVENOUS
Status: DISCONTINUED | OUTPATIENT
Start: 2022-01-01 | End: 2022-01-01 | Stop reason: HOSPADM

## 2022-01-01 RX ORDER — POTASSIUM CHLORIDE 1500 MG/1
TABLET, EXTENDED RELEASE ORAL
Qty: 45 TABLET | Refills: 1 | Status: SHIPPED | OUTPATIENT
Start: 2022-01-01

## 2022-01-01 RX ORDER — ATORVASTATIN CALCIUM 40 MG/1
40 TABLET, FILM COATED ORAL AT BEDTIME
Status: DISCONTINUED | OUTPATIENT
Start: 2022-01-01 | End: 2022-01-01 | Stop reason: HOSPADM

## 2022-01-01 RX ORDER — MULTIVITAMIN,THERAPEUTIC
1 TABLET ORAL DAILY
Status: DISCONTINUED | OUTPATIENT
Start: 2022-01-01 | End: 2022-01-01 | Stop reason: HOSPADM

## 2022-01-01 RX ORDER — ENOXAPARIN SODIUM 100 MG/ML
40 INJECTION SUBCUTANEOUS EVERY 24 HOURS
Status: DISCONTINUED | OUTPATIENT
Start: 2022-01-01 | End: 2022-01-01 | Stop reason: HOSPADM

## 2022-01-01 RX ORDER — ATORVASTATIN CALCIUM 40 MG/1
40 TABLET, FILM COATED ORAL AT BEDTIME
Qty: 90 TABLET | Refills: 0 | Status: SHIPPED | OUTPATIENT
Start: 2022-01-01 | End: 2022-01-01

## 2022-01-01 RX ORDER — LANOLIN ALCOHOL/MO/W.PET/CERES
100 CREAM (GRAM) TOPICAL DAILY
Qty: 30 TABLET | Refills: 0 | Status: SHIPPED | OUTPATIENT
Start: 2022-01-01

## 2022-01-01 RX ORDER — LORAZEPAM 1 MG/1
1-2 TABLET ORAL EVERY 30 MIN PRN
Status: DISCONTINUED | OUTPATIENT
Start: 2022-01-01 | End: 2022-01-01 | Stop reason: HOSPADM

## 2022-01-01 RX ORDER — IOPAMIDOL 755 MG/ML
75 INJECTION, SOLUTION INTRAVASCULAR ONCE
Status: COMPLETED | OUTPATIENT
Start: 2022-01-01 | End: 2022-01-01

## 2022-01-01 RX ORDER — MAGNESIUM SULFATE HEPTAHYDRATE 40 MG/ML
2 INJECTION, SOLUTION INTRAVENOUS ONCE
Status: COMPLETED | OUTPATIENT
Start: 2022-01-01 | End: 2022-01-01

## 2022-01-01 RX ORDER — ATORVASTATIN CALCIUM 40 MG/1
40 TABLET, FILM COATED ORAL AT BEDTIME
Qty: 90 TABLET | Refills: 2 | Status: SHIPPED | OUTPATIENT
Start: 2022-01-01

## 2022-01-01 RX ORDER — ISOSORBIDE DINITRATE 10 MG/1
TABLET ORAL
Qty: 270 TABLET | Refills: 0 | Status: SHIPPED | OUTPATIENT
Start: 2022-01-01

## 2022-01-01 RX ORDER — OMEPRAZOLE 40 MG/1
40 CAPSULE, DELAYED RELEASE ORAL DAILY
Qty: 90 CAPSULE | Refills: 1 | Status: SHIPPED | OUTPATIENT
Start: 2022-01-01 | End: 2022-01-01

## 2022-01-01 RX ORDER — SODIUM CHLORIDE 9 MG/ML
INJECTION, SOLUTION INTRAVENOUS CONTINUOUS
Status: DISCONTINUED | OUTPATIENT
Start: 2022-01-01 | End: 2022-01-01

## 2022-01-01 RX ORDER — MULTIPLE VITAMINS W/ MINERALS TAB 9MG-400MCG
1 TAB ORAL DAILY
Status: DISCONTINUED | OUTPATIENT
Start: 2022-01-01 | End: 2022-01-01 | Stop reason: HOSPADM

## 2022-01-01 RX ORDER — ISOSORBIDE DINITRATE 10 MG/1
10 TABLET ORAL
Status: DISCONTINUED | OUTPATIENT
Start: 2022-01-01 | End: 2022-01-01 | Stop reason: HOSPADM

## 2022-01-01 RX ORDER — OLANZAPINE 5 MG/1
5-10 TABLET, ORALLY DISINTEGRATING ORAL EVERY 6 HOURS PRN
Status: DISCONTINUED | OUTPATIENT
Start: 2022-01-01 | End: 2022-01-01 | Stop reason: HOSPADM

## 2022-01-01 RX ORDER — AMLODIPINE BESYLATE 10 MG/1
10 TABLET ORAL DAILY
Qty: 90 TABLET | Refills: 1 | Status: ON HOLD | OUTPATIENT
Start: 2022-01-01 | End: 2022-01-01

## 2022-01-01 RX ORDER — FOLIC ACID 1 MG/1
1 TABLET ORAL DAILY
Status: DISCONTINUED | OUTPATIENT
Start: 2022-01-01 | End: 2022-01-01 | Stop reason: HOSPADM

## 2022-01-01 RX ORDER — LORAZEPAM 2 MG/ML
1-2 INJECTION INTRAMUSCULAR EVERY 30 MIN PRN
Status: DISCONTINUED | OUTPATIENT
Start: 2022-01-01 | End: 2022-01-01 | Stop reason: HOSPADM

## 2022-01-01 RX ORDER — OMEPRAZOLE 40 MG/1
CAPSULE, DELAYED RELEASE ORAL
Qty: 90 CAPSULE | Refills: 2 | Status: SHIPPED | OUTPATIENT
Start: 2022-01-01

## 2022-01-01 RX ORDER — METOPROLOL SUCCINATE 50 MG/1
TABLET, EXTENDED RELEASE ORAL
Qty: 90 TABLET | Refills: 3 | Status: SHIPPED | OUTPATIENT
Start: 2022-01-01

## 2022-01-01 RX ORDER — IOPAMIDOL 755 MG/ML
100 INJECTION, SOLUTION INTRAVASCULAR ONCE
Status: COMPLETED | OUTPATIENT
Start: 2022-01-01 | End: 2022-01-01

## 2022-01-01 RX ORDER — METOPROLOL SUCCINATE 25 MG/1
50 TABLET, EXTENDED RELEASE ORAL DAILY
Status: DISCONTINUED | OUTPATIENT
Start: 2022-01-01 | End: 2022-01-01

## 2022-01-01 RX ORDER — ISOSORBIDE DINITRATE 10 MG/1
TABLET ORAL
Qty: 270 TABLET | Refills: 1 | Status: SHIPPED | OUTPATIENT
Start: 2022-01-01 | End: 2022-01-01

## 2022-01-01 RX ORDER — ONDANSETRON 2 MG/ML
4 INJECTION INTRAMUSCULAR; INTRAVENOUS EVERY 6 HOURS PRN
Status: DISCONTINUED | OUTPATIENT
Start: 2022-01-01 | End: 2022-01-01 | Stop reason: HOSPADM

## 2022-01-01 RX ADMIN — ISOSORBIDE DINITRATE 10 MG: 10 TABLET ORAL at 13:07

## 2022-01-01 RX ADMIN — SODIUM CHLORIDE 500 ML: 9 INJECTION, SOLUTION INTRAVENOUS at 03:12

## 2022-01-01 RX ADMIN — IOPAMIDOL 100 ML: 755 INJECTION, SOLUTION INTRAVENOUS at 17:51

## 2022-01-01 RX ADMIN — MULTIPLE VITAMINS W/ MINERALS TAB 1 TABLET: TAB at 08:03

## 2022-01-01 RX ADMIN — FOLIC ACID 1 MG: 1 TABLET ORAL at 08:03

## 2022-01-01 RX ADMIN — ASPIRIN 81 MG: 81 TABLET, COATED ORAL at 13:05

## 2022-01-01 RX ADMIN — ENOXAPARIN SODIUM 40 MG: 100 INJECTION SUBCUTANEOUS at 22:34

## 2022-01-01 RX ADMIN — SODIUM CHLORIDE: 9 INJECTION, SOLUTION INTRAVENOUS at 02:59

## 2022-01-01 RX ADMIN — Medication 100 MG: at 08:54

## 2022-01-01 RX ADMIN — PANTOPRAZOLE SODIUM 40 MG: 20 TABLET, DELAYED RELEASE ORAL at 06:51

## 2022-01-01 RX ADMIN — SODIUM CHLORIDE 100 ML: 9 INJECTION, SOLUTION INTRAVENOUS at 10:38

## 2022-01-01 RX ADMIN — IOPAMIDOL 75 ML: 755 INJECTION, SOLUTION INTRAVENOUS at 00:20

## 2022-01-01 RX ADMIN — ATORVASTATIN CALCIUM 40 MG: 40 TABLET, FILM COATED ORAL at 22:35

## 2022-01-01 RX ADMIN — SODIUM CHLORIDE: 9 INJECTION, SOLUTION INTRAVENOUS at 22:35

## 2022-01-01 RX ADMIN — MAGNESIUM SULFATE HEPTAHYDRATE 2 G: 40 INJECTION, SOLUTION INTRAVENOUS at 22:32

## 2022-01-01 RX ADMIN — SODIUM CHLORIDE 1000 ML: 9 INJECTION, SOLUTION INTRAVENOUS at 23:10

## 2022-01-01 RX ADMIN — SODIUM CHLORIDE 1000 ML: 9 INJECTION, SOLUTION INTRAVENOUS at 21:55

## 2022-01-01 RX ADMIN — PERFLUTREN 2 ML: 6.52 INJECTION, SUSPENSION INTRAVENOUS at 10:50

## 2022-01-01 RX ADMIN — FOLIC ACID 1 MG: 1 TABLET ORAL at 08:53

## 2022-01-01 RX ADMIN — LORAZEPAM 1 MG: 1 TABLET ORAL at 08:04

## 2022-01-01 RX ADMIN — Medication 100 MG: at 08:04

## 2022-01-01 RX ADMIN — MAGNESIUM SULFATE HEPTAHYDRATE 4 G: 4 INJECTION, SOLUTION INTRAVENOUS at 15:33

## 2022-01-01 RX ADMIN — PANTOPRAZOLE SODIUM 40 MG: 20 TABLET, DELAYED RELEASE ORAL at 22:34

## 2022-01-01 RX ADMIN — THERA TABS 1 TABLET: TAB at 08:53

## 2022-01-01 RX ADMIN — ASPIRIN 81 MG: 81 TABLET, COATED ORAL at 08:54

## 2022-01-01 RX ADMIN — THERA TABS 1 TABLET: TAB at 13:05

## 2022-01-01 ASSESSMENT — ACTIVITIES OF DAILY LIVING (ADL)
ADLS_ACUITY_SCORE: 39
ADLS_ACUITY_SCORE: 35
ADLS_ACUITY_SCORE: 37
ADLS_ACUITY_SCORE: 35
ADLS_ACUITY_SCORE: 39
DEPENDENT_IADLS:: INDEPENDENT
ADLS_ACUITY_SCORE: 35
ADLS_ACUITY_SCORE: 37
ADLS_ACUITY_SCORE: 35

## 2022-01-01 ASSESSMENT — PAIN SCALES - GENERAL: PAINLEVEL: NO PAIN (0)

## 2022-01-01 ASSESSMENT — ENCOUNTER SYMPTOMS
LIGHT-HEADEDNESS: 1
COUGH: 0
SHORTNESS OF BREATH: 0

## 2022-03-08 NOTE — TELEPHONE ENCOUNTER
"Routing refill request to provider for review/approval because:  A break in medication    Last Written Prescription Date:  2/5/2021  Last Fill Quantity: 90,  # refills: 3   Last office visit provider:  6/3/2021 Antonio Cota NP       lisinopriL (PRINIVIL,ZESTRIL) 40 MG tablet 90 tablet 3 2/5/2021  No   Sig: TAKE 1 TABLET BY MOUTH  DAILY   Sent to pharmacy as: lisinopriL 40 mg tablet (PRINIVIL,ZESTRIL)   Notes to Pharmacy: Requesting 1 year supply   E-Prescribing Status: Receipt confirmed by pharmacy (2/5/2021  1:09 PM CST       Requested Prescriptions   Pending Prescriptions Disp Refills     lisinopril (ZESTRIL) 40 MG tablet [Pharmacy Med Name: Lisinopril 40 MG Oral Tablet] 90 tablet 3     Sig: TAKE 1 TABLET BY MOUTH  DAILY       ACE Inhibitors (Including Combos) Protocol Passed - 3/7/2022  4:22 AM        Passed - Blood pressure under 140/90 in past 12 months     BP Readings from Last 3 Encounters:   08/10/21 130/72   06/03/21 (!) 178/76   03/08/21 (!) 198/80                 Passed - Recent (12 mo) or future (30 days) visit within the authorizing provider's specialty     Patient has had an office visit with the authorizing provider or a provider within the authorizing providers department within the previous 12 mos or has a future within next 30 days. See \"Patient Info\" tab in inbasket, or \"Choose Columns\" in Meds & Orders section of the refill encounter.              Passed - Medication is active on med list        Passed - Patient is age 18 or older        Passed - Normal serum creatinine on file in past 12 months     Recent Labs   Lab Test 03/08/21  1328   CR 0.75       Ok to refill medication if creatinine is low          Passed - Normal serum potassium on file in past 12 months     Recent Labs   Lab Test 06/03/21  1310   POTASSIUM 3.5                  Tammy Calderon RN 03/08/22 12:26 PM  "

## 2022-03-09 NOTE — TELEPHONE ENCOUNTER
Pt notified. Appt scheduled. Pt confirmed he will not run out of any medication prior to this date.

## 2022-03-09 NOTE — TELEPHONE ENCOUNTER
Please try patient again.  Due for med check with labs.  Previous call went unanswered.    Antonio Cota NP

## 2022-03-16 PROBLEM — G70.9 NEUROMUSCULAR WEAKNESS (H): Status: ACTIVE | Noted: 2022-01-01

## 2022-03-16 PROBLEM — Z86.73 HISTORY OF CVA (CEREBROVASCULAR ACCIDENT): Status: ACTIVE | Noted: 2018-09-17

## 2022-03-16 PROBLEM — Z95.5 STENTED CORONARY ARTERY: Status: ACTIVE | Noted: 2018-06-08

## 2022-03-16 PROBLEM — C18.2 PRIMARY ADENOCARCINOMA OF ASCENDING COLON (H): Status: RESOLVED | Noted: 2019-09-16 | Resolved: 2022-01-01

## 2022-03-16 NOTE — PROGRESS NOTES
Assessment & Plan     ICD-10-CM    1. Benign essential hypertension  I10 amLODIPine (NORVASC) 10 MG tablet     lisinopril (ZESTRIL) 40 MG tablet     metoprolol succinate ER (TOPROL-XL) 100 MG 24 hr tablet     Comprehensive metabolic panel (BMP + Alb, Alk Phos, ALT, AST, Total. Bili, TP)     Comprehensive metabolic panel (BMP + Alb, Alk Phos, ALT, AST, Total. Bili, TP)   2. Hyperlipidemia, unspecified hyperlipidemia type  E78.5 atorvastatin (LIPITOR) 40 MG tablet     Comprehensive metabolic panel (BMP + Alb, Alk Phos, ALT, AST, Total. Bili, TP)     Lipid panel     Comprehensive metabolic panel (BMP + Alb, Alk Phos, ALT, AST, Total. Bili, TP)     Lipid panel   3. History of non-ST elevation myocardial infarction (NSTEMI)  I25.2 isosorbide dinitrate (ISORDIL) 10 MG tablet   4. Gastroesophageal reflux disease without esophagitis  K21.9 omeprazole (PRILOSEC) 40 MG DR capsule   5. Hypokalemia  E87.6 potassium chloride ER (KLOR-CON M) 20 MEQ CR tablet     Comprehensive metabolic panel (BMP + Alb, Alk Phos, ALT, AST, Total. Bili, TP)     Comprehensive metabolic panel (BMP + Alb, Alk Phos, ALT, AST, Total. Bili, TP)   6. Cardiomyopathy, unspecified type (H)  I42.9 Comprehensive metabolic panel (BMP + Alb, Alk Phos, ALT, AST, Total. Bili, TP)     Comprehensive metabolic panel (BMP + Alb, Alk Phos, ALT, AST, Total. Bili, TP)   7. Coronary artery disease due to calcified coronary lesion  I25.10 Comprehensive metabolic panel (BMP + Alb, Alk Phos, ALT, AST, Total. Bili, TP)    I25.84 Comprehensive metabolic panel (BMP + Alb, Alk Phos, ALT, AST, Total. Bili, TP)   8. Screening for prostate cancer  Z12.5 PSA, screen     PSA, screen   9. Neuromuscular weakness (H)  G70.9    10. History of pulmonary embolism  Z86.711    11. Alcohol dependence, uncomplicated (H)  F10.20    12. Pulmonary fibrosis (H)  J84.10      Patient declines intervention for his pulmonary fibrosis.  Encouraged abstaining from alcohol.  Refill sent to the pharmacy.  " No chest pain, fluid retention, extremity swelling.  Update screening labs.  Reconnect with oncology.  Looks like colonoscopy was recommended but not performed.  Encouraged patient to get this done.  Continue same antihypertensives.    Reviewed oncology note x2, cardiology note x1, CEA x1, metabolic panel x1    Subjective     HPI     GERD  Well controlled with current omeprazole    HTN  Well controlled. HX NSTEMI. Asymptomatic today.  No recent cardiology follow up. Hx of CAD with 3V Coronary artery disease s/p 5 AMARJIT to LAD/LCX/RCA: No chest pain. No fluid retention. Doesn't add salt to food.      History of colon cancer  Last saw Dr. Sy about a year ago and reports being told, \"everything was good\".  Hasn't done his follow up colonoscopy yet.  When asked why not he admits, \" I don't have a good reason not to\".      Pulmonary fibrosis  Persistent. Worsening. SOB with walking 10 feet. Declined in the past meeting with pulmonary medicine and declines again today.      Review of Systems - negative except for what's listed in the HPI      Objective    /70 (BP Location: Left arm, Patient Position: Sitting, Cuff Size: Adult Regular)   Pulse 83   Wt 81.6 kg (180 lb)   SpO2 95%   BMI 29.95 kg/m    Physical Exam   General appearance - alert, well appearing, and in no distress  Mental status - alert, oriented to person, place, and time  Neck - no significant adenopathy  Lymphatics - no palpable lymphadenopathy  Chest -clear without wheezes or crackles.  Diminished breath sounds  Heart - normal rate and regular rhythm, S1 and S2 normal, no murmurs noted  Abdomen - soft, nontender, nondistended, no masses or organomegaly  Neurological - alert, oriented, normal speech, no focal findings or movement disorder noted, neck supple without rigidity, cranial nerves II through XII intact, motor and sensory grossly normal bilaterally, normal muscle tone, no tremors, strength 5/5  Musculoskeletal -slow sit to stand.  Gait " steady.  Extremities - peripheral pulses normal, no peripheral edema  Skin - normal coloration and turgor.    Antonio Cota, CNP    This note has been dictated using voice recognition software. Any grammatical or context distortions are unintentional and inherent to the software.

## 2022-03-16 NOTE — PATIENT INSTRUCTIONS
You're due for a follow up with Dr. Sy for cancer monitoring.    [phone] if his appointment contact number.    Refills sent to [facility] for when needed.    If you ever change your mind and you want to address the pulmonary fibrosis, let me know.

## 2022-06-20 NOTE — TELEPHONE ENCOUNTER
"Last Written Prescription Date:  3/16/22  Last Fill Quantity: 90,  # refills: 0   Last office visit provider:  3/16/22     Requested Prescriptions   Pending Prescriptions Disp Refills     atorvastatin (LIPITOR) 40 MG tablet [Pharmacy Med Name: Atorvastatin Calcium 40 MG Oral Tablet] 90 tablet 3     Sig: TAKE 1 TABLET BY MOUTH AT  BEDTIME       Statins Protocol Passed - 6/20/2022 11:34 AM        Passed - LDL on file in past 12 months     Recent Labs   Lab Test 03/16/22  1131   LDL 77             Passed - No abnormal creatine kinase in past 12 months     No lab results found.             Passed - Recent (12 mo) or future (30 days) visit within the authorizing provider's specialty     Patient has had an office visit with the authorizing provider or a provider within the authorizing providers department within the previous 12 mos or has a future within next 30 days. See \"Patient Info\" tab in inbasket, or \"Choose Columns\" in Meds & Orders section of the refill encounter.              Passed - Medication is active on med list        Passed - Patient is age 18 or older             Kiran Olson RN 06/20/22 11:34 AM  "

## 2022-08-05 PROBLEM — R55 SYNCOPE, UNSPECIFIED SYNCOPE TYPE: Status: ACTIVE | Noted: 2022-01-01

## 2022-08-05 PROBLEM — E87.5 HYPERKALEMIA: Status: ACTIVE | Noted: 2022-01-01

## 2022-08-05 PROBLEM — E86.1 HYPOTENSION DUE TO HYPOVOLEMIA: Status: ACTIVE | Noted: 2022-01-01

## 2022-08-05 PROBLEM — N17.9 ACUTE KIDNEY INJURY (H): Status: ACTIVE | Noted: 2022-01-01

## 2022-08-05 NOTE — H&P
[facility] MEDICINE ADMISSION HISTORY AND PHYSICAL     Assessment & Plan       Christian Edwards is a 66 year old male who presented with complaints of syncope versus presyncope.    Medical history is notable for essential hypertension, coronary disease with history of MI, DVT/PE, sleep apnea, alcohol abuse, history of colon cancer with history of right hemicolectomy.  Preliminary medication list includes amlodipine, aspirin, Lipitor, isosorbide, lisinopril, metoprolol, omeprazole and potassium supplement.    Initial evaluation revealed hypotension with systolic blood pressure in the 60s, acutely elevated creatinine, mild hyperkalemia hypomagnesemia.  CT chest with moderate peripheral fibrosis, no pulmonary embolism, mildly enlarged mediastinal and right hilar lymph nodes.  EKG with sinus rhythm and anterior T wave inversion.    Initial treatment included 2 L of normal saline, 2 g of magnesium IV.      Assessment and plan:  Syncope  Hypotension  Unclear etiology, does not appear septic  Avoid antihypertensive medications  Continue IV fluids  Monitor on telemetry  Trend troponins    Alcohol abuse  Alcohol dependence  Alcohol withdrawal  Likely playing a part in his presentation  Seems to be in mild alcohol withdrawal in the emergency department  Treat with Ativan as needed, vitamin supplements    Pulmonary fibrosis  Mildly enlarged mediastinal and right hilar lymph nodes on CT  Also describes some shortness of breath  Follow-up with pulmonary as an outpatient    Acute kidney injury  Hyperkalemia  Avoid nephrotoxic medications  Hold lisinopril and potassium supplement  Trend basic metabolic panel as he is treated with IV fluids    Hypomagnesemia  Replace magnesium per protocol  Monitor on telemetry    Diarrhea  Reports 10 watery stools 2 days ago  Will check C. difficile, stool culture    Urinary retention  Not high-volume retention currently  Monitor clinically  Straight cath for postvoid  residual greater than 400    History of coronary disease   history of colon cancer, treated with hemicolectomy, no recurrence by last visit  Essential hypertension  History of DVT/PE, not chronically anticoagulated  Obstructive sleep apnea    Clinically Significant Risk Factors Present on Admission        # Hyperkalemia: K = 5.6 mmol/L (Ref range: 3.5 - 5.0 mmol/L) on admission, will monitor as appropriate    # Hypomagnesemia: Mg = 1.4 mg/dL (Ref range: 1.8 - 2.6 mg/dL) on admission, will replace as needed       # Hypertension: home medication list includes antihypertensive(s)            DVTP: Mechanical Prophylaxis/ Sequential Compression Devices  Code Status: Full Code  Disposition: Inpatient   Expected LOS: 2 days   Goals for the hospitalization: Improvement in acute kidney injury, diarrhea  Diet: Regular  Fluids: Normal saline at 100/h    Disposition Plan      Expected Discharge Date: 08/06/2022               Chief Complaint  dizziness, possible loss of consciousness     HISTORY   Christian Edwards is a 66 year old male who presented with complaints of lightheadedness with possible syncope.  Denies any chest pain or palpitations.  Denies hitting his head.  Denies any significant chest pain but he is short of breath chronically.  No nausea or vomiting but he has had diarrhea lately.  Denies melena or hematochezia.  No dysuria but he is finding it difficult to urinate in the emergency department.  No significant lower extremity edema.  Says that he has not really had much food to eat in the last several days.  He does drink alcohol heavily on daily basis, drinks about 10 ounces of whiskey per day.  Denies any history of alcohol withdrawal seizure or other severe alcohol withdrawal that he is aware of..      Past Medical History     Past Medical History:  8/12/2019: Abnormal CT of the abdomen      Comment:  Added automatically from request for surgery [id number]  No date: Acute alcoholic hepatitis      Comment:   Created by Conversion   8/12/2019: Acute chest pain  No date: Acute myocardial infarction, unspecified site, episode of   care unspecified      Comment:  Created by Conversion   No date: Angioneurotic edema not elsewhere classified      Comment:  Created by Conversion   No date: Benign neoplasm of colon      Comment:  Created by Conversion   No date: Coronary atherosclerosis of unspecified type of vessel,   native or graft      Comment:  Created by Conversion [facility] Annotation: Jun 9 2008  3:15PM - Tammy Flowers: [place] May 2002   No date: Cough      Comment:  Created by Conversion   No date: Disorders of diaphragm      Comment:  Created by Conversion [facility] Annotation: Dec 10                2009 11:10AM - Tammy Flowers: [email or web address].  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      Comment:  Created by Conversion   No date: Lumbago      Comment:  Created by Conversion   No date: Other and unspecified hyperlipidemia      Comment:  Created by Conversion   No date: PE (pulmonary thromboembolism) (H)  6/3/2015: Pre-operative cardiovascular examination  8/8/2019: Rectal bleeding  No date: Sleep apnea      Comment:  CPAP     Surgical History     Past Surgical History:   Procedure Laterality Date     CARDIAC SURGERY      five stents     COLECTOMY N/A 8/19/2019    Procedure: RIGHT COLECTOMY, OPEN AND UMBILICAL HERNIA REPAIR;  Surgeon: Epifanio Black MD;  Location: [facility];  Service: General     COLONOSCOPY N/A 8/13/2019    Procedure: COLONOSCOPY WITH BIOPSY;  Surgeon: Antonio Rajput MD;  Location: [facility] OR;  Service: Gastroenterology     CV CORONARY ANGIOGRAM N/A 8/12/2019    Procedure: Coronary Angiogram;  Surgeon: Eddie Rocha MD;  Location: [facility] Lab;  Service: Cardiology  "    CV LEFT HEART CATHETERIZATION WITHOUT LEFT VENTRICULOGRAM Left 2019    Procedure: Left Heart Catheterization Without Left Ventriculogram;  Surgeon: Eddie Rocha MD;  Location: [facility] Lab;  Service: Cardiology     IR IVC FILTER PLACEMENT  2019     IR IVC FILTER PLACEMENT  2019     IR IVC FILTER REMOVAL  10/4/2019     IR REMOVAL IVC FILTER  10/4/2019     OTHER SURGICAL HISTORY      stemi     PICC AND MIDLINE TEAM LINE INSERTION  2019          Family History      Family History   Problem Relation Age of Onset     Sleep Apnea Brother      Chronic Obstructive Pulmonary Disease Mother      Heart Disease Father      No Known Problems Sister       Social History      Social History     Tobacco Use     Smoking status: Former Smoker     Packs/day: 0.00     Quit date: 2002     Years since quittin.6     Smokeless tobacco: Never Used   Substance Use Topics     Alcohol use: Yes     Alcohol/week: 14.0 standard drinks     Comment: Alcoholic Drinks/day: drinks daily. 6-8 drinks per day/quit 19     Drug use: Yes     Types: Marijuana     Comment: Drug use: \"once in a while\"      Allergies   No Known Allergies  Prior to Admission Medications      Prior to Admission Medications   Prescriptions Last Dose Informant Patient Reported? Taking?   amLODIPine (NORVASC) 10 MG tablet   No No   Sig: Take 1 tablet (10 mg) by mouth daily   aspirin 81 MG EC tablet   No No   Sig: [ASPIRIN 81 MG EC TABLET] Take 1 tablet (81 mg total) by mouth daily.   atorvastatin (LIPITOR) 40 MG tablet   No No   Sig: Take 1 tablet (40 mg) by mouth At Bedtime   isosorbide dinitrate (ISORDIL) 10 MG tablet   No No   Sig: TAKE 1 TABLET BY MOUTH 3  TIMES DAILY AT 8:00 AM,  12:00 NOON, AND 6:00 PM   lisinopril (ZESTRIL) 40 MG tablet   No No   Sig: [LISINOPRIL (PRINIVIL,ZESTRIL) 40 MG TABLET] TAKE 1 TABLET BY MOUTH  DAILY   metoprolol succinate ER (TOPROL-XL) 100 MG 24 hr tablet   No No   Sig: TAKE 1 TABLET BY MOUTH  DAILY "   multivitamin (MULTIVITAMIN) per tablet   Yes No   Sig: [MULTIVITAMIN (MULTIVITAMIN) PER TABLET] Take 1 tablet by mouth daily.   nitroglycerin (NITROSTAT) 0.4 MG SL tablet   Yes No   Sig: Place 0.4 mg under the tongue every 5 minutes as needed    omeprazole (PRILOSEC) 40 MG DR capsule   No No   Sig: Take 1 capsule (40 mg) by mouth daily   potassium chloride ER (KLOR-CON M) 20 MEQ CR tablet   No No   Sig: TAKE 1 TABLET BY MOUTH  EVERY OTHER DAY      Facility-Administered Medications: None      Review of Systems     A 12 point comprehensive review of systems was negative except as noted above in HPI.    PHYSICAL EXAMINATION     Vitals      Temp:  [97.6  F (36.4  C)] 97.6  F (36.4  C)  Pulse:  [55-69] 69  Resp:  [20-32] 21  BP: ()/(40-56) 100/56  SpO2:  [98 %-100 %] 98 %    Examination   Physical Exam:    Gen: no acute distress, does seem anxious  ENT: no scleral icterus  Pulm: Breathing comfortably in room air at rest, imaging with peripheral pulmonary fibrosis  CV: regular rate and rhythm, no significant lower extremity pitting edema  GI: abdomen is soft, non-tender, non-distended with active bowel sounds.  MSK: no obvious deformities of the extremities  Derm: Not pale, no jaundice  Psych: Anxious, slightly tremulous      Pertinent Radiology     Radiology Results:   Recent Results (from the past 24 hour(s))   Chest XR,  PA & LAT    Narrative    EXAM: XR CHEST 2 VIEWS  LOCATION: [facility]  DATE/TIME: 8/4/2022 11:24 PM    INDICATION: hypotension, near syncope  COMPARISON: 06/03/2021      Impression    IMPRESSION: The heart is normal in size. Coarse interstitial opacities are identified bilaterally, stable. No focal infiltrate, pleural effusion, or pneumothorax. The right hemidiaphragm is elevated, stable.   CT Chest Pulmonary Embolism w Contrast    Narrative    EXAM: CT CHEST PULMONARY EMBOLISM W CONTRAST  LOCATION: [facility]  DATE/TIME: 8/5/2022 12:17  AM    INDICATION: syncope, hypotension  COMPARISON: None.  TECHNIQUE: CT chest pulmonary angiogram during arterial phase injection of IV contrast. Multiplanar reformats and MIP reconstructions were performed. Dose reduction techniques were used.   CONTRAST: [id number] 75ml    FINDINGS:  ANGIOGRAM CHEST: Pulmonary arteries are normal caliber and negative for pulmonary emboli. Thoracic aorta is negative for dissection. No CT evidence of right heart strain.    LUNGS AND PLEURA: Moderate peripheral fibrosis bilaterally. Focal infiltrate, pleural effusion, or pneumothorax    MEDIASTINUM/AXILLAE: 14 mm right peritracheal lymph node. 12 mm right hilar lymph node. Heart is normal in size. No pericardial effusion is identified.    CORONARY ARTERY CALCIFICATION: Severe.    UPPER ABDOMEN: Diffuse fatty infiltration of the liver. Manner the visualized portions of the abdomen are grossly normal.    MUSCULOSKELETAL: Normal.      Impression    IMPRESSION:  1.  Moderate peripheral fibrosis.  2.  No pulmonary embolism or aortic dissection.  3.  Mildly enlarged mediastinal and right hilar lymph nodes, which may be reactive.     EKG Results: personally reviewed.     Advance Care Planning        Bayron Cuellar DO  [facility] [place]   Phone: #[phone]

## 2022-08-05 NOTE — ED TRIAGE NOTES
Pt reports he was feeling dizzy today, had a near syncopal episode while in the grocery store and fell. Reports he still feels dizzy.      Triage Assessment     Row Name 08/04/22 2107       Triage Assessment (Adult)    Airway WDL WDL       Respiratory WDL    Respiratory WDL WDL       Skin Circulation/Temperature WDL    Skin Circulation/Temperature WDL WDL       Cardiac WDL    Cardiac WDL X;rhythm    Cardiac Rhythm bradycardic       Peripheral/Neurovascular WDL    Peripheral Neurovascular WDL WDL       Cognitive/Neuro/Behavioral WDL    Cognitive/Neuro/Behavioral WDL WDL

## 2022-08-05 NOTE — PHARMACY-ADMISSION MEDICATION HISTORY
Pharmacy Note - Admission Medication History    Pertinent Provider Information: Patient was able to provide last known times of administration and usually takes all of his medications at noon with the exception of Isosorbide (6am, 12pm, 6pm)     ______________________________________________________________________    Prior To Admission (PTA) med list completed and updated in EMR.       PTA Med List   Medication Sig Last Dose     amLODIPine (NORVASC) 10 MG tablet Take 1 tablet (10 mg) by mouth daily 8/4/2022 at 1200     aspirin 81 MG EC tablet [ASPIRIN 81 MG EC TABLET] Take 1 tablet (81 mg total) by mouth daily. 8/4/2022 at 1200     atorvastatin (LIPITOR) 40 MG tablet Take 1 tablet (40 mg) by mouth At Bedtime 8/4/2022 at 1200     isosorbide dinitrate (ISORDIL) 10 MG tablet TAKE 1 TABLET BY MOUTH 3  TIMES DAILY AT 8:00 AM,  12:00 NOON, AND 6:00 PM 8/4/2022 at 1200     lisinopril (ZESTRIL) 40 MG tablet [LISINOPRIL (PRINIVIL,ZESTRIL) 40 MG TABLET] TAKE 1 TABLET BY MOUTH  DAILY 8/4/2022 at 1200     metoprolol succinate ER (TOPROL-XL) 100 MG 24 hr tablet TAKE 1 TABLET BY MOUTH  DAILY 8/4/2022 at 1200     multivitamin (MULTIVITAMIN) per tablet [MULTIVITAMIN (MULTIVITAMIN) PER TABLET] Take 1 tablet by mouth daily. 8/4/2022 at 1200     omeprazole (PRILOSEC) 40 MG DR capsule Take 1 capsule (40 mg) by mouth daily 8/4/2022 at 1200     potassium chloride ER (KLOR-CON M) 20 MEQ CR tablet TAKE 1 TABLET BY MOUTH  EVERY OTHER DAY 8/3/2022 at 1200       Information source(s): Patient, Clinic records and [facility]  Method of interview communication: in-person    Summary of Changes to PTA Med List  New: None  Discontinued: None  Changed: None    Patient was asked about OTC/herbal products specifically.  PTA med list reflects this.    In the past week, patient estimated taking medication this percent of the time:  greater than 90%.    Allergies were reviewed, assessed, and updated with the patient.      Patient does  not use any multi-dose medications prior to admission.    The information provided in this note is only as accurate as the sources available at the time of the update(s).    Thank you for the opportunity to participate in the care of this patient.    Delio Hinds  8/5/2022 8:33 AM

## 2022-08-05 NOTE — PROGRESS NOTES
[facility] MEDICINE PROGRESS NOTE      Identification/Summary: 66 year old male with a history of CAD, alcohol dependence, colol cancer, dvt/PE into the ER with significant diarrhea and syncope.    Problem list:    1.  Syncope: most likely volume down; he is not septic appearing with poor perfusion or a nidus   Of infection;   2.  Diarrhea:chronic since partial colectomy; test for c diff though low liklihood  3.  Alcohol dependence: pt is a daily drinker, not interested in rehab; CIWA for now   Will revisit tomorrow  4.  MARGOT: volume replacement; recheck labs  5.  History of MURALI; no use of   6.  History of Cardiomyopathy: echo done here on admission with EF of 55-60%;    Continue current therapy  7.  Hyperkalemia  8.  Low magnesium : add iv replacment  9.  CAD:  Continue long acting nitrates, history of 5 AMARJIT  10.  Essential HTN: hold antihypertensives due to pts pressures  11.  Metabolic acidosis: (17) likely due to decreased oral intake with both food and   Appropriate fluids;   12.  Dyslipidemia: continue statins  13.  History of dvt: historically treated with xarelto; lovenox prophylaxis       Diet: Regular Diet Adult  DVT Prophylaxis:  Low Risk/Ambulatory with no VTE prophylaxis indicated  Code Status: Full Code      Bharti Ovalle MD  [facility] Medicine  [facility]  Phone: #[phone]    Interval History/Subjective: feeling better since getting ivf      Physical Exam/Objective:  Temp:  [97.6  F (36.4  C)] 97.6  F (36.4  C)  Pulse:  [] 68  Resp:  [12-55] 21  BP: ()/(40-66) 103/54  SpO2:  [85 %-100 %] 97 %  Body mass index is 29.05 kg/m .    GENERAL:  Alert, appears comfortable, in no acute distress, appears stated age   HEAD:  Normocephalic, without obvious abnormality, atraumatic   EYES:  PERRL, conjunctiva/corneas clear, no scleral icterus, EOM's intact   NOSE: Nares normal, septum midline, mucosa normal, no drainage   THROAT:  Lips, mucosa, and tongue normal; teeth and gums normal, mouth moist   NECK: Supple, symmetrical, trachea midline   BACK:   Symmetric, no curvature, ROM normal   LUNGS:   Clear to auscultation bilaterally, no rales, rhonchi, or wheezing, symmetric chest rise on inhalation, respirations unlabored   CHEST WALL:  No tenderness or deformity   HEART:  Regular rate and rhythm, S1 and S2 normal, no murmur, rub, or gallop    ABDOMEN:   Soft, non-tender, bowel sounds active all four quadrants, no masses, no organomegaly, no rebound or guarding   EXTREMITIES: Extremities normal, atraumatic, no cyanosis or edema    SKIN: Dry to touch, no exanthems in the visualized areas   NEURO: Alert, oriented x3, moves all four extremities freely   PSYCH: Cooperative, behavior is appropriate      Data reviewed today: I personally reviewed all new medications, labs, imaging/diagnostics reports over the past 24 hours. Pertinent findings include:    Imaging:   Recent Results (from the past 24 hour(s))   Chest XR,  PA & LAT    Narrative    EXAM: XR CHEST 2 VIEWS  LOCATION: [facility]  DATE/TIME: 8/4/2022 11:24 PM    INDICATION: hypotension, near syncope  COMPARISON: 06/03/2021      Impression    IMPRESSION: The heart is normal in size. Coarse interstitial opacities are identified bilaterally, stable. No focal infiltrate, pleural effusion, or pneumothorax. The right hemidiaphragm is elevated, stable.   CT Chest Pulmonary Embolism w Contrast    Narrative    EXAM: CT CHEST PULMONARY EMBOLISM W CONTRAST  LOCATION: [facility]  DATE/TIME: 8/5/2022 12:17 AM    INDICATION: syncope, hypotension  COMPARISON: None.  TECHNIQUE: CT chest pulmonary angiogram during arterial phase injection of IV contrast. Multiplanar reformats and MIP reconstructions were performed. Dose reduction techniques were used.   CONTRAST: [id number] 75ml    FINDINGS:  ANGIOGRAM CHEST: Pulmonary arteries are normal caliber and  negative for pulmonary emboli. Thoracic aorta is negative for dissection. No CT evidence of right heart strain.    LUNGS AND PLEURA: Moderate peripheral fibrosis bilaterally. Focal infiltrate, pleural effusion, or pneumothorax    MEDIASTINUM/AXILLAE: 14 mm right peritracheal lymph node. 12 mm right hilar lymph node. Heart is normal in size. No pericardial effusion is identified.    CORONARY ARTERY CALCIFICATION: Severe.    UPPER ABDOMEN: Diffuse fatty infiltration of the liver. Manner the visualized portions of the abdomen are grossly normal.    MUSCULOSKELETAL: Normal.      Impression    IMPRESSION:  1.  Moderate peripheral fibrosis.  2.  No pulmonary embolism or aortic dissection.  3.  Mildly enlarged mediastinal and right hilar lymph nodes, which may be reactive.       Labs:  Most Recent 3 BMP's:Recent Labs   Lab Test 08/04/22  2120 03/16/22  1131 06/03/21  1310 03/08/21  1328   * 141  --  142   POTASSIUM 5.6* 4.6 3.5 3.6   CHLORIDE 101 100  --  103   CO2 15* 27  --  23   BUN 23* 8  --  8   CR 1.91* 0.92  --  0.75   ANIONGAP 17 14  --  16   MERLYN 9.6 9.8  --  9.1    136*  --  110       Medications:   Personally Reviewed.  Medications     sodium chloride 100 mL/hr at 08/05/22 0259       folic acid  1 mg Oral Daily     multivitamin w/minerals  1 tablet Oral Daily     thiamine  100 mg Oral Daily

## 2022-08-05 NOTE — ED PROVIDER NOTES
EMERGENCY DEPARTMENT ENCOUNTER      NAME: Christian Edwards  AGE: 66 year old male  YOB: 1955  MRN: [id number]  EVALUATION DATE & TIME: 8/4/2022  9:22 PM    PCP: Antonio Cota    ED PROVIDER: Rico Mendoza M.D.    Chief Complaint   Patient presents with     Dizziness     Fall       FINAL IMPRESSION:  1. Hypotension due to hypovolemia    2. Acute kidney injury (H)    3. Hyperkalemia    4. Hypomagnesemia    5. Syncope, unspecified syncope type        ED COURSE & MEDICAL DECISION MAKING:    Pertinent Labs & Imaging studies personally reviewed and interpreted by me. (See chart for details)  9:46 PM Patient seen and examined, prior records reviewed. PPE worn including surgical mask, surgical gloves.  Differential diagnosis includes but not limited to vasovagal syncope, dehydration, electrolyte disturbance, dysrhythmia, myocardial infarction, pulmonary embolism, urinary tract infection, pneumonia, intracranial hemorrhage.  Patient presents with an episode of lightheadedness.  Quite hypotensive on initial exam.  No fever, cough, abdominal pain, nausea, vomiting, or diarrhea to suggest infectious etiology.  Denies chest pain or shortness of breath, no lower extremity swelling.  Labs and IV fluids are initiated, patient is not tachycardic although he is on metoprolol which might blunt his response.  Denies new medications or changes in medications.  Denies fall or head injury.  EKG is reassuring.  11:16 PM labs demonstrate hypomagnesemia which is replaced intravenously.  Also noted to have mild hyperkalemia with no EKG changes.  Creatinine significantly elevated from baseline and CO2 is slightly low consistent with dehydration.  Lactate slightly elevated but white blood cell count is normal and no other signs of infection.  Procalcitonin is in normal range, sepsis is unlikely.  Troponin is negative.  Additional fluids are ordered, chest x-ray is ordered.  Plan for admission for further evaluation and  treatment.  No pressors indicated at this time.  11:52 PM chest x-ray personally reviewed and interpreted by me appears to demonstrate a left lower lobe infiltrate, no cardiomegaly or cardiac abnormalities suggest pericardial effusion.  12:48 AM chest CT is negative for any acute findings.  Patient once again transiently responded to IV fluids and blood pressures have again lowered although MAP is still over 60.  We will plan for observation overnight, further evaluation and treatment by the inpatient team.  No evidence for pericardial effusion, pulmonary embolism, sepsis, acute coronary syndrome.  1:03 AM I spoke with the hospitalist Dr. Cuellar who accepts the patient.    At the conclusion of the encounter I discussed the results of all of the tests and the disposition. The questions were answered. The patient or family acknowledged understanding and was agreeable with the care plan.     PROCEDURES:   Procedures    MEDICATIONS GIVEN IN THE EMERGENCY:  Medications   0.9% sodium chloride BOLUS (0 mLs Intravenous Stopped 8/4/22 2307)   magnesium sulfate 2 g in water intermittent infusion (0 g Intravenous Stopped 8/4/22 2334)   0.9% sodium chloride BOLUS (0 mLs Intravenous Stopped 8/4/22 2357)   iopamidol (ISOVUE-370) solution 75 mL (75 mLs Intravenous Given 8/5/22 0020)       NEW PRESCRIPTIONS STARTED AT TODAY'S ER VISIT  New Prescriptions    No medications on file       =================================================================    HPI    Patient information was obtained from: Patient      Christian Edwards is a 66 year old male with a pertinent history of NSTEMI (x2), s/p heart stent placement, diaphragmatic paralysis, hyperlipidemia, CAD, hypertension, CVA, colon cancer s/p surgical removal who presents to this ED by walk in with his daughter for evaluation of lightheadedness, fall. Patient reports he was at a grocery store earlier today when he started to feel lightheaded and fell to the floor. Denies chest  pain or any other symptoms prior to his fall. Patient denies syncope. He endorses a history of lightheadedness in the past, though not this severe. Patient reports his fall was mild and did not sustain any major injuries. No other reported complaints at this time.    Denies a cough, rhinorrhea, or recent cold. No leg swelling. History of paralyzed diaphragm, but no new shortness of breath. Patient endorses a history of 2 heart attacks with his last one being 10 years ago. Patient reports he is on a variety of medications without any recent changes to them.      REVIEW OF SYSTEMS   Review of Systems   Respiratory: Negative for cough and shortness of breath (new).    Cardiovascular: Negative for chest pain and leg swelling.   Neurological: Positive for light-headedness. Negative for syncope.      All other systems reviewed and negative    PAST MEDICAL HISTORY:  Past Medical History:   Diagnosis Date     Abnormal CT of the abdomen 8/12/2019    Added automatically from request for surgery [id number]     Acute alcoholic hepatitis     Created by Conversion      Acute chest pain 8/12/2019     Acute myocardial infarction, unspecified site, episode of care unspecified     Created by Conversion      Angioneurotic edema not elsewhere classified     Created by Conversion      Benign neoplasm of colon     Created by Conversion      Coronary atherosclerosis of unspecified type of vessel, native or graft     Created by Conversion [facility] Annotation: Jun 9 2008  3:15PM - Tammy Flowers: [place] May 2002      Cough     Created by Conversion      Disorders of diaphragm     Created by Conversion [facility] Annotation: Dec 10 2009 11:10AM - Tammy Flowers: [email or web address].  No clear etiology      DVT (deep venous thrombosis) (H)      Early satiety     Created by Conversion      Intestinal disaccharidase deficiencies and disaccharide malabsorption     Created by Conversion      Lumbago     Created by Conversion      Other and unspecified  hyperlipidemia     Created by Conversion      PE (pulmonary thromboembolism) (H)      Pre-operative cardiovascular examination 6/3/2015     Rectal bleeding 8/8/2019     Sleep apnea     CPAP       PAST SURGICAL HISTORY:  Past Surgical History:   Procedure Laterality Date     CARDIAC SURGERY      five stents     COLECTOMY N/A 8/19/2019    Procedure: RIGHT COLECTOMY, OPEN AND UMBILICAL HERNIA REPAIR;  Surgeon: Epifanio Black MD;  Location: [facility];  Service: General     COLONOSCOPY N/A 8/13/2019    Procedure: COLONOSCOPY WITH BIOPSY;  Surgeon: Antonio Rajput MD;  Location: [facility] OR;  Service: Gastroenterology     CV CORONARY ANGIOGRAM N/A 8/12/2019    Procedure: Coronary Angiogram;  Surgeon: Eddie Rocha MD;  Location: [facility] Cath Lab;  Service: Cardiology     CV LEFT HEART CATHETERIZATION WITHOUT LEFT VENTRICULOGRAM Left 8/12/2019    Procedure: Left Heart Catheterization Without Left Ventriculogram;  Surgeon: Eddie Rocha MD;  Location: [facility] Cath Lab;  Service: Cardiology     IR IVC FILTER PLACEMENT  8/9/2019     IR IVC FILTER PLACEMENT  8/9/2019     IR IVC FILTER REMOVAL  10/4/2019     IR REMOVAL IVC FILTER  10/4/2019     OTHER SURGICAL HISTORY      stemi     PICC AND MIDLINE TEAM LINE INSERTION  8/12/2019            CURRENT MEDICATIONS:    No current facility-administered medications for this encounter.     Current Outpatient Medications   Medication     amLODIPine (NORVASC) 10 MG tablet     aspirin 81 MG EC tablet     atorvastatin (LIPITOR) 40 MG tablet     isosorbide dinitrate (ISORDIL) 10 MG tablet     lisinopril (ZESTRIL) 40 MG tablet     metoprolol succinate ER (TOPROL-XL) 100 MG 24 hr tablet     multivitamin (MULTIVITAMIN) per tablet     nitroglycerin (NITROSTAT) 0.4 MG SL tablet     omeprazole (PRILOSEC) 40 MG DR capsule     potassium chloride ER (KLOR-CON M) 20 MEQ CR tablet       ALLERGIES:  No Known Allergies    FAMILY HISTORY:  Family History  "  Problem Relation Age of Onset     Sleep Apnea Brother      Chronic Obstructive Pulmonary Disease Mother      Heart Disease Father      No Known Problems Sister        SOCIAL HISTORY:   Social History     Socioeconomic History     Marital status:    Tobacco Use     Smoking status: Former Smoker     Packs/day: 0.00     Quit date: 2002     Years since quittin.6     Smokeless tobacco: Never Used   Substance and Sexual Activity     Alcohol use: Yes     Alcohol/week: 14.0 standard drinks     Comment: Alcoholic Drinks/day: drinks daily. 6-8 drinks per day/quit 19     Drug use: Yes     Types: Marijuana     Comment: Drug use: \"once in a while\"     Sexual activity: Not Currently       VITALS:  /56   Pulse 69   Temp 97.6  F (36.4  C)   Resp 21   Ht 1.676 m (5' 6\")   SpO2 98%   BMI 29.05 kg/m      PHYSICAL EXAM:  Physical Exam  Vitals and nursing note reviewed.   Constitutional:       Appearance: Normal appearance.   HENT:      Head: Normocephalic and atraumatic.      Right Ear: External ear normal.      Left Ear: External ear normal.      Nose: Nose normal.      Mouth/Throat:      Mouth: Mucous membranes are moist.   Eyes:      Extraocular Movements: Extraocular movements intact.      Conjunctiva/sclera: Conjunctivae normal.      Pupils: Pupils are equal, round, and reactive to light.   Cardiovascular:      Rate and Rhythm: Normal rate and regular rhythm.   Pulmonary:      Effort: Pulmonary effort is normal.      Breath sounds: Normal breath sounds. No wheezing or rales.   Abdominal:      General: Abdomen is flat. There is no distension.      Palpations: Abdomen is soft.      Tenderness: There is no abdominal tenderness. There is no guarding.   Musculoskeletal:         General: Normal range of motion.      Cervical back: Normal range of motion and neck supple.      Right lower leg: No edema.      Left lower leg: No edema.   Lymphadenopathy:      Cervical: No cervical adenopathy.   Skin:     " General: Skin is warm and dry.   Neurological:      General: No focal deficit present.      Mental Status: He is alert and oriented to person, place, and time. Mental status is at baseline.      Comments: No gross focal neurologic deficits   Psychiatric:         Mood and Affect: Mood normal.         Behavior: Behavior normal.         Thought Content: Thought content normal.       LAB:  All pertinent labs reviewed and interpreted.  Results for orders placed or performed during the hospital encounter of 08/04/22   Chest XR,  PA & LAT    Impression    IMPRESSION: The heart is normal in size. Coarse interstitial opacities are identified bilaterally, stable. No focal infiltrate, pleural effusion, or pneumothorax. The right hemidiaphragm is elevated, stable.   CT Chest Pulmonary Embolism w Contrast    Impression    IMPRESSION:  1.  Moderate peripheral fibrosis.  2.  No pulmonary embolism or aortic dissection.  3.  Mildly enlarged mediastinal and right hilar lymph nodes, which may be reactive.   Extra Blue Top Tube   Result Value Ref Range    Hold Specimen JIC    Extra Red Top Tube   Result Value Ref Range    Hold Specimen JIC    Extra Green Top (Lithium Heparin) Tube   Result Value Ref Range    Hold Specimen JIC    Extra Purple Top Tube   Result Value Ref Range    Hold Specimen JIC    Basic metabolic panel   Result Value Ref Range    Sodium 133 (L) 136 - 145 mmol/L    Potassium 5.6 (H) 3.5 - 5.0 mmol/L    Chloride 101 98 - 107 mmol/L    Carbon Dioxide (CO2) 15 (L) 22 - 31 mmol/L    Anion Gap 17 5 - 18 mmol/L    Urea Nitrogen 23 (H) 8 - 22 mg/dL    Creatinine 1.91 (H) 0.70 - 1.30 mg/dL    Calcium 9.6 8.5 - 10.5 mg/dL    Glucose 104 70 - 125 mg/dL    GFR Estimate 38 (L) >60 mL/min/1.73m2   Lactic acid whole blood   Result Value Ref Range    Lactic Acid 2.3 (H) 0.7 - 2.0 mmol/L   Result Value Ref Range    Troponin I <0.01 0.00 - 0.29 ng/mL   Result Value Ref Range    Magnesium 1.4 (L) 1.8 - 2.6 mg/dL   B-Type Natriuretic  Peptide ([facility] Only)   Result Value Ref Range     (H) 0 - 59 pg/mL   UA with Microscopic reflex to Culture    Specimen: Urine, Midstream   Result Value Ref Range    Color Urine Light Yellow Colorless, Straw, Light Yellow, Yellow    Appearance Urine Clear Clear    Glucose Urine Negative Negative mg/dL    Bilirubin Urine Negative Negative    Ketones Urine Negative Negative mg/dL    Specific Gravity Urine 1.009 1.001 - 1.030    Blood Urine Negative Negative    pH Urine 5.0 5.0 - 7.0    Protein Albumin Urine Negative Negative mg/dL    Urobilinogen Urine <2.0 <2.0 mg/dL    Nitrite Urine Negative Negative    Leukocyte Esterase Urine Negative Negative    Mucus Urine Present (A) None Seen /LPF    RBC Urine <1 <=2 /HPF    WBC Urine 2 <=5 /HPF    Squamous Epithelials Urine <1 <=1 /HPF    Hyaline Casts Urine 15 (H) <=2 /LPF   Result Value Ref Range    Procalcitonin 0.15 0.00 - 0.49 ng/mL   CBC with platelets and differential   Result Value Ref Range    WBC Count 10.7 4.0 - 11.0 10e3/uL    RBC Count 3.31 (L) 4.40 - 5.90 10e6/uL    Hemoglobin 11.3 (L) 13.3 - 17.7 g/dL    Hematocrit 33.6 (L) 40.0 - 53.0 %     (H) 78 - 100 fL    MCH 34.1 (H) 26.5 - 33.0 pg    MCHC 33.6 31.5 - 36.5 g/dL    RDW 12.3 10.0 - 15.0 %    Platelet Count 231 150 - 450 10e3/uL    % Neutrophils 67 %    % Lymphocytes 19 %    % Monocytes 9 %    % Eosinophils 3 %    % Basophils 1 %    % Immature Granulocytes 1 %    NRBCs per 100 WBC 0 <1 /100    Absolute Neutrophils 7.2 1.6 - 8.3 10e3/uL    Absolute Lymphocytes 2.1 0.8 - 5.3 10e3/uL    Absolute Monocytes 0.9 0.0 - 1.3 10e3/uL    Absolute Eosinophils 0.3 0.0 - 0.7 10e3/uL    Absolute Basophils 0.1 0.0 - 0.2 10e3/uL    Absolute Immature Granulocytes 0.1 <=0.4 10e3/uL    Absolute NRBCs 0.0 10e3/uL       RADIOLOGY:  Reviewed all pertinent imaging. Please see official radiology report.  CT Chest Pulmonary Embolism w Contrast   Final Result   IMPRESSION:   1.  Moderate peripheral fibrosis.   2.  No  pulmonary embolism or aortic dissection.   3.  Mildly enlarged mediastinal and right hilar lymph nodes, which may be reactive.      Chest XR,  PA & LAT   Final Result   IMPRESSION: The heart is normal in size. Coarse interstitial opacities are identified bilaterally, stable. No focal infiltrate, pleural effusion, or pneumothorax. The right hemidiaphragm is elevated, stable.          EKG:    Performed at: 04-AUG-2022 21:18  Impression: No acute ischemic changes  Rate: 57 bpm  Rhythm: Sinus  Axis: 44 76 87  VT Interval: 182 ms  QRS Interval: 92 ms  QTc Interval: 416 ms  ST Changes: ST depression in 1, aVL, V2 through V6  Comparison: When compared with EKG of 13-AUG-2019, no acute changes    I have independently reviewed and interpreted the EKG(s) documented above.    I, Renaldo Majano, am serving as a scribe to document services personally performed by Dr. Mendoza based on my observation and the provider's statements to me. I, Rico Mendoza MD attest that Renaldo Majano is acting in a scribe capacity, has observed my performance of the services and has documented them in accordance with my direction.    Rico Mendoza M.D.  Emergency Medicine  [facility] EMERGENCY ROOM  [address] [place] [id number] [phone]  Dept: [phone]     Rico Mendoza MD  08/05/22 0155

## 2022-08-06 NOTE — PLAN OF CARE
"PRIMARY DIAGNOSIS: \"GENERIC\" NURSING  OUTPATIENT/OBSERVATION GOALS TO BE MET BEFORE DISCHARGE:  ADLs back to baseline: Yes    Activity and level of assistance: Ambulating independently.    Pain status: Pain free.    Return to near baseline physical activity: Yes     Discharge Planner Nurse   Safe discharge environment identified: Yes  Barriers to discharge: Yes       Entered by: Haider Harley RN 08/05/2022 11:04 PM     Please review provider order for any additional goals.   Nurse to notify provider when observation goals have been met and patient is ready for discharge.Goal Outcome Evaluation:    Plan of Care Reviewed With: patient     Overall Patient Progress: improving    Outcome Evaluation: patient progressing      "

## 2022-08-06 NOTE — CONSULTS
Care Management Initial Consult    General Information  Assessment completed with: Patient, patient  Type of CM/SW Visit: Initial Assessment    Primary Care Provider verified and updated as needed:     Readmission within the last 30 days:        Reason for Consult: discharge planning  Advance Care Planning:            Communication Assessment  Patient's communication style: spoken language (English or Bilingual)             Cognitive  Cognitive/Neuro/Behavioral: WDL                      Living Environment:   People in home: alone, child(laurence), adult     Current living Arrangements: apartment      Able to return to prior arrangements: yes       Family/Social Support:  Care provided by: self  Provides care for: no one  Marital Status: Single             Description of Support System: Supportive    Support Assessment: Adequate family and caregiver support    Current Resources:   Patient receiving home care services: No     Community Resources: None  Equipment currently used at home: none  Supplies currently used at home: None    Employment/Financial:  Employment Status:          Financial Concerns:             Lifestyle & Psychosocial Needs:  Social Determinants of Health     Tobacco Use: Medium Risk     Smoking Tobacco Use: Former Smoker     Smokeless Tobacco Use: Never Used   Alcohol Use: Not on file   Financial Resource Strain: Not on file   Food Insecurity: Not on file   Transportation Needs: Not on file   Physical Activity: Not on file   Stress: Not on file   Social Connections: Not on file   Intimate Partner Violence: Not on file   Depression: Not at risk     PHQ-2 Score: 0   Housing Stability: Not on file       Functional Status:  Prior to admission patient needed assistance:   Dependent ADLs:: Independent  Dependent IADLs:: Independent       Mental Health Status:  Mental Health Status: No Current Concerns       Chemical Dependency Status:  Chemical Dependency Status: Current Concern              Values/Beliefs:  Spiritual, Cultural Beliefs, Catholic Practices, Values that affect care:                 Additional Information:  Chart reviewed. SW met with patient in his room, introduced self, CM role. Patient lives in an apartment suite connected to his daughter and son in law's home. Pt reports he is independent at baseline. He has Medicare. He denies needs or resources for CD/ETOH use , pt reports he has been cutting down on his drinking.   CM will remain available as needed.     Roula Perales, LGSW

## 2022-08-06 NOTE — PLAN OF CARE
PRIMARY DIAGNOSIS: SYNCOPE  OUTPATIENT/OBSERVATION GOALS TO BE MET BEFORE DISCHARGE:  1. Orthostatic performed: No    2. Diagnostic testing complete & at baseline neurologic testing: N/A    3. Cleared by consultants (if involved): N/A    4. Interpretation of cardiac rhythm per telemetry tech: sinus rhythm with 1st degree block    5. Tolerating adequate PO diet and medications: Yes    6. Return to near baseline physical activity or neurologic status: Yes    Discharge Planner Nurse   Safe discharge environment identified: Yes  Barriers to discharge: Yes, medical clearance        Entered by: Adriana Stiles RN 08/06/2022 3:22 AM  VSS except slightly soft BP, denies dizziness/lightheadedness, HA, blurred vision, or pain,  alert and oriented x 4, bed alarms on, scored zeros on CIWA,however, would have mild tremors at times with movement, on Mg and Phosphorus protocols, Mg to be rechecked on 8/7 and awaiting Dipti result.

## 2022-08-08 NOTE — DISCHARGE SUMMARY
[facility] MEDICINE  DISCHARGE SUMMARY     Primary Care Physician: Antonio Cota  Admission Date: 8/4/2022   Discharge Provider: Bharti Ovalle MD Discharge Date: 8/8/2022   Diet:   Active Diet and Nourishment Order   Procedures     Diet       Code Status: Prior   Activity: as tolerates        Condition at Discharge: improved     REASON FOR PRESENTATION(See Admission Note for Details)     Presyncope, hypovolemia    PRINCIPAL & ACTIVE DISCHARGE DIAGNOSES       1.  Diarrhea  2.  Hypotension secondary to hypovolemia  3.  Hyperkalemia  4.  MARGOT  5.  Urinary retention, history of  6.  Alcohol dependence  7.  Presyncope secondary to hypotension  8.  Low magnesemia  9.  Hyponatremia  10.  Acidosis, metabolic: wide gap       PENDING LABS     Unresulted Labs Ordered in the Past 30 Days of this Admission     No orders found from 7/5/2022 to 8/5/2022.            PROCEDURES ( this hospitalization only)      echocardiogram    RECOMMENDATIONS TO OUTPATIENT PROVIDER FOR F/U VISIT     Follow-up Appointments     Follow-up and recommended labs and tests       Follow up primary care 1-2 weeks                   DISPOSITION     Home    SUMMARY OF HOSPITAL COURSE:      66 year old male admitted on 8/5 after arriving in the ER for a presyncopal event and diarrhea  Pt has a past medical history of colon cancer treated with a right colectomy, alcohol dependence, CAD and hypertension.  Pt had a significant amount of diarrhea  On the day prior and the day of admission.  He was not toxic appearing nor did he fit criteria for sepsis. The lactic acid was elevated on admission but this was thought to   Be due to his decreased oral intake due to alcohol use.  He was not septic.      He had electrolyte abnormalities including hyperkalemia with a potassium of 5.6, sodium  Of 133 and magnesium of 1.4.  In addition he had an MARGOT with a creatinine of 1.91.    He was supported with ivf and rechecking of the  electrolytes.  He resolved the MARGOT  With a discharged creatinine of .82; the electrolytes normalized.  Pts blood pressure medications including norvasc and lisinopril were held due to his volume depletion.  Even after this intervention he continued with some soft blood pressures despite volume  Replacement and symptom relief.      CT imaging of the abdomen and pelvis was unremarkable for acute pathology. He does have significant amount of atherosclerotic vascular disease.  The  Diarrhea resolved.  The patient did not fit criteria for c diff testing thus it was cancelled.  A repeat echocardiogram was done due to his history of coronary disease and hypertension and now hypotension.  Pt has an ejection fraction of 55-60% along with  Normal appearing valves.     Pt resolved his symptoms. We discussed his ongoing alcohol use.  He affirmed that it  Is an issue and will consider readdressing it with his primary care provider on his  Regular clinic follow up. Further discussions are recommended regarding his  Anti hypertensive regimen.  He may need to have the meds restarted on his revisit.     Discharge Medications with Med changes:     Discharge Medication List as of 8/6/2022  1:49 PM      START taking these medications    Details   folic acid (FOLVITE) 1 MG tablet Take 1 tablet (1 mg) by mouth daily, Disp-30 tablet, R-0, E-Prescribe      thiamine (B-1) 100 MG tablet Take 1 tablet (100 mg) by mouth daily, Disp-30 tablet, R-0, E-Prescribe         CONTINUE these medications which have NOT CHANGED    Details   aspirin 81 MG EC tablet [ASPIRIN 81 MG EC TABLET] Take 1 tablet (81 mg total) by mouth daily., R-0, OTC      atorvastatin (LIPITOR) 40 MG tablet Take 1 tablet (40 mg) by mouth At Bedtime, Disp-90 tablet, R-2, E-Prescribe      isosorbide dinitrate (ISORDIL) 10 MG tablet TAKE 1 TABLET BY MOUTH 3  TIMES DAILY AT 8:00 AM,  12:00 NOON, AND 6:00 PM, Disp-270 tablet, R-1, E-Prescribe      metoprolol succinate ER (TOPROL-XL)  100 MG 24 hr tablet TAKE 1 TABLET BY MOUTH  DAILY, Disp-90 tablet, R-1, E-Prescribe      multivitamin (MULTIVITAMIN) per tablet [MULTIVITAMIN (MULTIVITAMIN) PER TABLET] Take 1 tablet by mouth daily., Historical      nitroglycerin (NITROSTAT) 0.4 MG SL tablet Place 0.4 mg under the tongue every 5 minutes as needed , Historical      omeprazole (PRILOSEC) 40 MG DR capsule Take 1 capsule (40 mg) by mouth daily, Disp-90 capsule, R-1, E-Prescribe      potassium chloride ER (KLOR-CON M) 20 MEQ CR tablet TAKE 1 TABLET BY MOUTH  EVERY OTHER DAY, Disp-45 tablet, R-1, E-Prescribe         STOP taking these medications       amLODIPine (NORVASC) 10 MG tablet Comments:   Reason for Stopping:         lisinopril (ZESTRIL) 20 MG tablet Comments:   Reason for Stopping:                               CARE MANAGEMENT / SOCIAL WORK IP CONSULT    Immunizations given this encounter     Most Recent Immunizations   Administered Date(s) Administered     DT (PEDS <7y) 12/03/2002     Flu, Unspecified 12/10/2009     HepA, Unspecified 11/09/2009     HepB, Unspecified 06/11/2008     TD (ADULT, 7+) 12/04/2020     Td,adult,historic,unspecified 11/09/2009     Tdap (Adacel,Boostrix) 11/09/2009                   SIGNIFICANT IMAGING FINDINGS     Results for orders placed or performed during the hospital encounter of 08/04/22   Chest XR,  PA & LAT    Impression    IMPRESSION: The heart is normal in size. Coarse interstitial opacities are identified bilaterally, stable. No focal infiltrate, pleural effusion, or pneumothorax. The right hemidiaphragm is elevated, stable.   CT Chest Pulmonary Embolism w Contrast    Impression    IMPRESSION:  1.  Moderate peripheral fibrosis.  2.  No pulmonary embolism or aortic dissection.  3.  Mildly enlarged mediastinal and right hilar lymph nodes, which may be reactive.   CT Abdomen Pelvis w/o Contrast    Impression    IMPRESSION:   1.  Stable post right colectomy changes with paucity of stool throughout the colon,  unchanged. No evidence of an enteritis or colitis.  2.  Extensive atherosclerotic vascular disease.  3.  Pulmonary fibrosis.           SIGNIFICANT LABORATORY FINDINGS     Most Recent 3 BMP's:Recent Labs   Lab Test 08/06/22  0538 08/04/22  2120 03/16/22  1131    133* 141   POTASSIUM 4.7 5.6* 4.6   CHLORIDE 110* 101 100   CO2 19* 15* 27   BUN 12 23* 8   CR 0.82 1.91* 0.92   ANIONGAP 8 17 14   MERLYN 8.8 9.6 9.8   GLC 98 104 136*           Discharge Orders        Reason for your hospital stay    Diarrhea, low magnesium, alcohol dependence,     Follow-up and recommended labs and tests     Follow up primary care 1-2 weeks     Activity    As tolerated     Diet    regular       Examination   Physical Exam      Wt Readings from Last 1 Encounters:   08/06/22 75.3 kg (165 lb 14.4 oz)       General Appearance: I want to go home; I feel better  Respiratory: clear  Cardiovascular: regular  GI: soft, NDNT, +BS  Skin: no rashes  Neuro:  Pt ambulated with me; stable       Please see EMR for more detailed significant labs, imaging, consultant notes etc.    IBharti MD, personally saw the patient today and spent less than or equal to 30 minutes discharging this patient.    Bharti Ovalle MD  [facility]    CC:Antonio Cota

## 2022-08-08 NOTE — TELEPHONE ENCOUNTER
Reason for Call:   Appointment, Requested Provider:  Antonio Cota    PCP: Antonio Cota    Reason for visit: Hospital follow up    Duration of symptoms: n/a    Have you been treated for this in the past? No    Additional comments: Patient was told he needs to be seen in 1-2 weeks. Soonest available with Antonio Cota is 8/26/22. Patient stated that is too far out. He asked if he could be worked into his schedule within 1-2 weeks and in the afternoon. Please advise and call back.    Can we leave a detailed message on this number? YES    Phone number patient can be reached at: Home number on file [phone] (home)    Best Time: Anytime    Call taken on 8/8/2022 at 4:14 PM by Davina Prater

## 2022-08-12 NOTE — PROGRESS NOTES
Assessment & Plan     ICD-10-CM    1. Acute kidney injury (H)  N17.9    2. Benign essential hypertension  I10 metoprolol succinate ER (TOPROL XL) 50 MG 24 hr tablet     Basic metabolic panel     CANCELED: Basic metabolic panel   3. Impacted cerumen of left ear  H61.22 REMOVE IMPACTED CERUMEN   4. Primary adenocarcinoma of ascending colon (H)  C18.2 Comprehensive metabolic panel   5. Hypomagnesemia  E83.42 Magnesium     Magnesium   6. Alcoholic Hepatitis  K70.10    7. Hyperkalemia  E87.5      Encouraged avoiding all tobacco and alcohol products.  Patient is starting to be planning life without alcohol.  Hopefully he can continue on with this.  We reviewed different treatment options including therapy, group support, and medication.  He declines all these for now.  Blood pressure is still running a bit low at home.  Decrease metoprolol from 100 mg to 50 mg.  Okay to continue holding lisinopril and amlodipine.  Recheck renal function and electrolytes to confirm stability/improvement.  Large amounts of cerumen removed with curette.  Well-tolerated.  TM intact post procedure.    Reviewed ED note x1, hospitalist note x1, magnesium x1, metabolic panel x1, COVID x1, UA x1, troponin x1, echocardiogram x1, ECG x1    Subjective     HPI       Hospital Follow-up Visit:    Hospital/Nursing Home/IP Rehab Facility: [facility]  Date of Admission: 8/4/22  Date of Discharge: 8/8/22  Reason(s) for Admission: diarrhea, lolita, hypotension    Was your hospitalization related to COVID-19? No   Problems taking medications regularly:  None  Medication changes since discharge: None  Problems adhering to non-medication therapy:  None    Summary of hospitalization:  [facility] discharge summary reviewed  Diagnostic Tests/Treatments reviewed.  Follow up needed: none  Other Healthcare Providers Involved in Patient s Care:         None  Update since discharge: improved.    66 year old male admitted on 8/5  after arriving in the ER for a presyncopal event and diarrhea  Pt has a past medical history of colon cancer treated with a right colectomy, alcohol dependence, CAD and hypertension.  Pt had a significant amount of diarrhea   On the day prior and the day of admission.  He was not toxic appearing nor did he fit criteria for sepsis. The lactic acid was elevated on admission but this was thought to   Be due to his decreased oral intake due to alcohol use.  He was not septic.       He had electrolyte abnormalities including hyperkalemia with a potassium of 5.6, sodium  Of 133 and magnesium of 1.4.  In addition he had an MARGOT with a creatinine of 1.91.    He was supported with ivf and rechecking of the electrolytes.  He resolved the MARGOT  With a discharged creatinine of .82; the electrolytes normalized.  Pts blood pressure medications including norvasc and lisinopril were held due to his volume depletion.  Even after this intervention he continued with some soft blood pressures despite volume  Replacement and symptom relief.       CT imaging of the abdomen and pelvis was unremarkable for acute pathology. He does have significant amount of atherosclerotic vascular disease.  The  Diarrhea resolved.  The patient did not fit criteria for c diff testing thus it was cancelled.  A repeat echocardiogram was done due to his history of coronary disease and hypertension and now hypotension.  Pt has an ejection fraction of 55-60% along with  Normal appearing valves.      Pt resolved his symptoms. We discussed his ongoing alcohol use.  He affirmed that it  Is an issue and will consider readdressing it with his primary care provider on his  Regular clinic follow up. Further discussions are recommended regarding his  Anti hypertensive regimen.  He may need to have the meds restarted on his revisit.      Plan of care communicated with patient    Post Medication Reconciliation Status: Discharge medications reconciled and changed, see  notes/orders         He brings in BP numbers today showing average systolic in the 90's. Amlodipine and lisinopril was DC'd. BM solid again. Regular. No abdominal pain. No muscle cramps.  He's been talking with his son about cutting out alcohol.       Review of Systems - negative except for what's listed in the HPI      Objective    /60 (BP Location: Right arm, Patient Position: Sitting, Cuff Size: Adult Large)   Pulse 68   Temp 98  F (36.7  C)   Wt 77.3 kg (170 lb 8 oz)   SpO2 99%   BMI 27.52 kg/m    Physical Exam   General appearance - alert, well appearing, and in no distress  Mental status - alert, oriented to person, place, and time  Eyes -PERRLA, sclera white  Ears -left cerumen impaction.  Right canal and TM normal.  Mouth - mucous membranes moist. No oral lesions.  Neck - no significant adenopathy  Lymphatics - no palpable lymphadenopathy  Chest - clear to auscultation, no wheezes, rales or rhonchi, symmetric air entry  Heart - normal rate and regular rhythm, S1 and S2 normal, no murmurs noted  Abdomen - soft, nontender, nondistended, no masses or organomegaly  Neurological - alert, oriented, normal speech, no focal findings or movement disorder noted, neck supple without rigidity, cranial nerves II through XII intact, motor and sensory grossly normal bilaterally, normal muscle tone, no tremors, strength 5/5  Extremities - peripheral pulses normal, no peripheral edema  Skin - normal coloration and turgor.    Antonio Cota, CNP    This note has been dictated using voice recognition software. Any grammatical or context distortions are unintentional and inherent to the software.

## 2022-08-12 NOTE — PATIENT INSTRUCTIONS
You can go ahead and continue to hold your amlodipine and lisinopril.  Lets also decrease your metoprolol dosing from 100 mg down to 50 mg.  I sent this new dose to your mail order pharmacy.    For you, I would recommend no alcohol use.  If you need help with this either through medication or counseling support let me know and I can connect you with resources.    Keep checking your blood pressure every few days and write the numbers down.  If still low like what you have been having, go ahead and let me know.  If numbers are creeping up higher than 140/90 let me know.    Updating blood work today to make sure your kidneys and electrolytes are okay.

## 2022-08-25 NOTE — TELEPHONE ENCOUNTER
Chief Complaint   Patient presents with     Medication Reconciliation     Last post hospital visit with Antonio Cota on 8/12. Medication reconcile completed.    Plan of care communicated with patient     Post Medication Reconciliation Status: Discharge medications reconciled and changed, see notes/orders    Sugey Clements RN on 8/25/2022 at 11:01 AM

## 2022-09-01 NOTE — TELEPHONE ENCOUNTER
Refill Request  Medication name: Pending Prescriptions:                       Disp   Refills    folic acid (FOLVITE) 1 MG tablet          30 tab*0            Sig: Take 1 tablet (1 mg) by mouth daily    Requested Pharmacy: [facility]

## 2022-09-02 NOTE — TELEPHONE ENCOUNTER
"Last Written Prescription Date:  8/6/22  Last Fill Quantity: 30,  # refills: 0   Last office visit provider:  8/12/22     Requested Prescriptions   Pending Prescriptions Disp Refills     folic acid (FOLVITE) 1 MG tablet 30 tablet 0     Sig: Take 1 tablet (1 mg) by mouth daily       Vitamin Supplements (Adult) Protocol Passed - 9/2/2022 11:01 AM        Passed - High dose Vitamin D not ordered        Passed - Recent (12 mo) or future (30 days) visit within the authorizing provider's specialty     Patient has had an office visit with the authorizing provider or a provider within the authorizing providers department within the previous 12 mos or has a future within next 30 days. See \"Patient Info\" tab in inbasket, or \"Choose Columns\" in Meds & Orders section of the refill encounter.              Passed - Medication is active on med list             Kiran Olson RN 09/02/22 11:02 AM  "

## 2022-09-04 NOTE — TELEPHONE ENCOUNTER
"amLODIPine (NORVASC) 10 MG tablet (Discontinued) 90 tablet 1 3/16/2022 8/6/2022 No   Sig - Route: Take 1 tablet (10 mg) by mouth daily - Oral   Sent to pharmacy as: amLODIPine Besylate 10 MG Oral Tablet (NORVASC)   Class: E-Prescribe   Reason for Discontinue: Stop at Discharge   Order: [id number]     Routing refill request to provider for review/approval because:  Drug not active on patient's medication list  Lisinopril 40 mg    Last Written Prescription Date:    Last Fill Quantity: ,  # refills:    Last office visit provider:  8/12/22     Requested Prescriptions   Pending Prescriptions Disp Refills     isosorbide dinitrate (ISORDIL) 10 MG tablet [Pharmacy Med Name: ISOSORBIDE DINITRATE  10MG  TAB] 270 tablet 3     Sig: TAKE 1 TABLET BY MOUTH 3  TIMES DAILY AT 8AM, 12 NOON AND 6PM       Nitrates Passed - 9/4/2022  4:41 AM        Passed - Blood pressure under 140/90 in past 12 months     BP Readings from Last 3 Encounters:   08/12/22 116/60   08/06/22 103/64   03/16/22 100/70                 Passed - Pt is not on erectile dysfunction medications        Passed - Recent (12 mo) or future (30 days) visit within the authorizing provider's specialty     Patient has had an office visit with the authorizing provider or a provider within the authorizing providers department within the previous 12 mos or has a future within next 30 days. See \"Patient Info\" tab in inbasket, or \"Choose Columns\" in Meds & Orders section of the refill encounter.              Passed - Medication is active on med list        Passed - Patient is age 18 or older           lisinopril (ZESTRIL) 40 MG tablet [Pharmacy Med Name: Lisinopril 40 MG Oral Tablet] 90 tablet 3     Sig: TAKE 1 TABLET BY MOUTH  DAILY       ACE Inhibitors (Including Combos) Protocol Failed - 9/4/2022  4:41 AM        Failed - Medication is active on med list        Passed - Blood pressure under 140/90 in past 12 months     BP Readings from Last 3 Encounters:   08/12/22 116/60 " "  08/06/22 103/64   03/16/22 100/70                 Passed - Recent (12 mo) or future (30 days) visit within the authorizing provider's specialty     Patient has had an office visit with the authorizing provider or a provider within the authorizing providers department within the previous 12 mos or has a future within next 30 days. See \"Patient Info\" tab in inbasket, or \"Choose Columns\" in Meds & Orders section of the refill encounter.              Passed - Patient is age 18 or older        Passed - Normal serum creatinine on file in past 12 months     Recent Labs   Lab Test 08/12/22  1456   CR 0.83       Ok to refill medication if creatinine is low          Passed - Normal serum potassium on file in past 12 months     Recent Labs   Lab Test 08/12/22  1456   POTASSIUM 4.6                amLODIPine (NORVASC) 10 MG tablet [Pharmacy Med Name: amLODIPine Besylate 10 MG Oral Tablet] 90 tablet 3     Sig: TAKE 1 TABLET BY MOUTH  DAILY       Calcium Channel Blockers Protocol  Failed - 9/4/2022  4:41 AM        Failed - Medication is active on med list        Passed - Blood pressure under 140/90 in past 12 months     BP Readings from Last 3 Encounters:   08/12/22 116/60   08/06/22 103/64   03/16/22 100/70                 Passed - Recent (12 mo) or future (30 days) visit within the authorizing provider's specialty     Patient has had an office visit with the authorizing provider or a provider within the authorizing providers department within the previous 12 mos or has a future within next 30 days. See \"Patient Info\" tab in inXumiisket, or \"Choose Columns\" in Meds & Orders section of the refill encounter.              Passed - Patient is age 18 or older        Passed - Normal serum creatinine on file in past 12 months     Recent Labs   Lab Test 08/12/22  1456   CR 0.83       Ok to refill medication if creatinine is low               Ludington, Cindy, RN 09/04/22 5:30 PM    "

## 2022-10-13 NOTE — TELEPHONE ENCOUNTER
"Last Written Prescription Date:  8/12/22  Last Fill Quantity: 90,  # refills: 0   Last office visit provider:  8/12/22     Requested Prescriptions   Pending Prescriptions Disp Refills     metoprolol succinate ER (TOPROL XL) 50 MG 24 hr tablet [Pharmacy Med Name: Metoprolol Succinate ER 50 MG Oral Tablet Extended Release 24 Hour] 90 tablet 3     Sig: TAKE 1 TABLET BY MOUTH  DAILY       Beta-Blockers Protocol Passed - 10/13/2022  1:14 PM        Passed - Blood pressure under 140/90 in past 12 months     BP Readings from Last 3 Encounters:   08/12/22 116/60   08/06/22 103/64   03/16/22 100/70                 Passed - Patient is age 6 or older        Passed - Recent (12 mo) or future (30 days) visit within the authorizing provider's specialty     Patient has had an office visit with the authorizing provider or a provider within the authorizing providers department within the previous 12 mos or has a future within next 30 days. See \"Patient Info\" tab in inbasket, or \"Choose Columns\" in Meds & Orders section of the refill encounter.              Passed - Medication is active on med list             Kiran Olson RN 10/13/22 1:14 PM  "

## 2022-10-15 NOTE — TELEPHONE ENCOUNTER
"Last Written Prescription Date:  3/16/22  Last Fill Quantity: 90,  # refills: 1   Last office visit provider:  8/12/22     Requested Prescriptions   Pending Prescriptions Disp Refills     omeprazole (PRILOSEC) 40 MG DR capsule [Pharmacy Med Name: Omeprazole 40 MG Oral Capsule Delayed Release] 90 capsule 3     Sig: TAKE 1 CAPSULE BY MOUTH  DAILY       PPI Protocol Passed - 10/14/2022  9:42 PM        Passed - Not on Clopidogrel (unless Pantoprazole ordered)        Passed - No diagnosis of osteoporosis on record        Passed - Recent (12 mo) or future (30 days) visit within the authorizing provider's specialty     Patient has had an office visit with the authorizing provider or a provider within the authorizing providers department within the previous 12 mos or has a future within next 30 days. See \"Patient Info\" tab in inbasket, or \"Choose Columns\" in Meds & Orders section of the refill encounter.              Passed - Medication is active on med list        Passed - Patient is age 18 or older             Cindy Tadeo RN 10/15/22 1:27 PM  "

## 2022-11-21 ENCOUNTER — TELEPHONE (OUTPATIENT)
Dept: FAMILY MEDICINE | Facility: CLINIC | Age: 67
End: 2022-11-21

## 2022-11-21 NOTE — TELEPHONE ENCOUNTER
Outside records from [facility] received showing patient  on 2022.  Please make sure that this information gets passed on to the team to get the patient's chart marked as .    Antonio Cota NP

## 2023-01-06 NOTE — TELEPHONE ENCOUNTER
Last Refill:   Pantoprazole - 4/13/20 for #90, 0 refills  Metoprolol - 9/16/19 for #90, 3 refills  Isosorbide dinitrate - 3/6/20 for #270, 0 refills (Take 1 po three times a day)    Last Visit: [facility] Virtual Visit 3/24/20  Last OV: 8/7/19  
Reached out to patient and relayed the below message. Sherry Ford  
98
